# Patient Record
Sex: MALE | Race: BLACK OR AFRICAN AMERICAN | Employment: OTHER | URBAN - METROPOLITAN AREA
[De-identification: names, ages, dates, MRNs, and addresses within clinical notes are randomized per-mention and may not be internally consistent; named-entity substitution may affect disease eponyms.]

---

## 2017-02-15 ENCOUNTER — HOSPITAL ENCOUNTER (INPATIENT)
Age: 72
LOS: 2 days | Discharge: NURSING, REHAB OR LONG-TERM CARE | DRG: 314 | End: 2017-02-17
Attending: EMERGENCY MEDICINE | Admitting: INTERNAL MEDICINE
Payer: MEDICARE

## 2017-02-15 ENCOUNTER — APPOINTMENT (OUTPATIENT)
Dept: GENERAL RADIOLOGY | Age: 72
DRG: 314 | End: 2017-02-15
Attending: EMERGENCY MEDICINE
Payer: MEDICARE

## 2017-02-15 DIAGNOSIS — R50.9 FEVER, UNSPECIFIED FEVER CAUSE: Primary | ICD-10-CM

## 2017-02-15 DIAGNOSIS — N17.9 AKI (ACUTE KIDNEY INJURY) (HCC): ICD-10-CM

## 2017-02-15 LAB
ALBUMIN SERPL BCP-MCNC: 2.7 G/DL (ref 3.4–5)
ALBUMIN/GLOB SERPL: 0.6 {RATIO} (ref 0.8–1.7)
ALP SERPL-CCNC: 101 U/L (ref 45–117)
ALT SERPL-CCNC: 41 U/L (ref 16–61)
ANION GAP BLD CALC-SCNC: 13 MMOL/L (ref 3–18)
APPEARANCE UR: ABNORMAL
AST SERPL W P-5'-P-CCNC: 91 U/L (ref 15–37)
BACTERIA URNS QL MICRO: ABNORMAL /HPF
BASOPHILS # BLD AUTO: 0 K/UL (ref 0–0.1)
BASOPHILS # BLD: 0 % (ref 0–3)
BILIRUB SERPL-MCNC: 0.8 MG/DL (ref 0.2–1)
BILIRUB UR QL: NEGATIVE
BUN SERPL-MCNC: 85 MG/DL (ref 7–18)
BUN/CREAT SERPL: 18 (ref 12–20)
CALCIUM SERPL-MCNC: 8.1 MG/DL (ref 8.5–10.1)
CHLORIDE SERPL-SCNC: 105 MMOL/L (ref 100–108)
CO2 SERPL-SCNC: 26 MMOL/L (ref 21–32)
COLOR UR: ABNORMAL
CREAT SERPL-MCNC: 4.84 MG/DL (ref 0.6–1.3)
DIFFERENTIAL METHOD BLD: ABNORMAL
EOSINOPHIL # BLD: 0.2 K/UL (ref 0–0.4)
EOSINOPHIL NFR BLD: 1 % (ref 0–5)
EPITH CASTS URNS QL MICRO: ABNORMAL /LPF (ref 0–5)
ERYTHROCYTE [DISTWIDTH] IN BLOOD BY AUTOMATED COUNT: 16.8 % (ref 11.6–14.5)
EST. AVERAGE GLUCOSE BLD GHB EST-MCNC: 177 MG/DL
FLUAV AG NPH QL IA: NEGATIVE
FLUBV AG NOSE QL IA: NEGATIVE
GLOBULIN SER CALC-MCNC: 4.3 G/DL (ref 2–4)
GLUCOSE BLD STRIP.AUTO-MCNC: 160 MG/DL (ref 70–110)
GLUCOSE SERPL-MCNC: 235 MG/DL (ref 74–99)
GLUCOSE UR STRIP.AUTO-MCNC: NEGATIVE MG/DL
HBA1C MFR BLD: 7.8 % (ref 4.2–5.6)
HCT VFR BLD AUTO: 42.1 % (ref 36–48)
HGB BLD-MCNC: 13.7 G/DL (ref 13–16)
HGB UR QL STRIP: NEGATIVE
KETONES UR QL STRIP.AUTO: NEGATIVE MG/DL
LACTATE BLD-SCNC: 1.1 MMOL/L (ref 0.4–2)
LACTATE BLD-SCNC: 1.8 MMOL/L (ref 0.4–2)
LEUKOCYTE ESTERASE UR QL STRIP.AUTO: NEGATIVE
LYMPHOCYTES # BLD AUTO: 1 % (ref 20–51)
LYMPHOCYTES # BLD: 0.2 K/UL (ref 0.8–3.5)
MCH RBC QN AUTO: 28.1 PG (ref 24–34)
MCHC RBC AUTO-ENTMCNC: 32.5 G/DL (ref 31–37)
MCV RBC AUTO: 86.4 FL (ref 74–97)
MONOCYTES # BLD: 0.9 K/UL (ref 0–1)
MONOCYTES NFR BLD AUTO: 5 % (ref 2–9)
NEUTS BAND NFR BLD MANUAL: 2 % (ref 0–5)
NEUTS SEG # BLD: 16 K/UL (ref 1.8–8)
NEUTS SEG NFR BLD AUTO: 91 % (ref 42–75)
NITRITE UR QL STRIP.AUTO: NEGATIVE
PH UR STRIP: 5 [PH] (ref 5–8)
PLATELET # BLD AUTO: 96 K/UL (ref 135–420)
PLATELET COMMENTS,PCOM: ABNORMAL
PMV BLD AUTO: 10.4 FL (ref 9.2–11.8)
POTASSIUM SERPL-SCNC: 3.3 MMOL/L (ref 3.5–5.5)
PROT SERPL-MCNC: 7 G/DL (ref 6.4–8.2)
PROT UR STRIP-MCNC: 30 MG/DL
RBC # BLD AUTO: 4.87 M/UL (ref 4.7–5.5)
RBC #/AREA URNS HPF: ABNORMAL /HPF (ref 0–5)
RBC MORPH BLD: ABNORMAL
SODIUM SERPL-SCNC: 144 MMOL/L (ref 136–145)
SP GR UR REFRACTOMETRY: 1.02 (ref 1–1.03)
UROBILINOGEN UR QL STRIP.AUTO: 1 EU/DL (ref 0.2–1)
WBC # BLD AUTO: 17.6 K/UL (ref 4.6–13.2)
WBC URNS QL MICRO: ABNORMAL /HPF (ref 0–4)

## 2017-02-15 PROCEDURE — 82962 GLUCOSE BLOOD TEST: CPT

## 2017-02-15 PROCEDURE — 85025 COMPLETE CBC W/AUTO DIFF WBC: CPT | Performed by: EMERGENCY MEDICINE

## 2017-02-15 PROCEDURE — 74011000258 HC RX REV CODE- 258: Performed by: EMERGENCY MEDICINE

## 2017-02-15 PROCEDURE — 87070 CULTURE OTHR SPECIMN AEROBIC: CPT | Performed by: INTERNAL MEDICINE

## 2017-02-15 PROCEDURE — 96367 TX/PROPH/DG ADDL SEQ IV INF: CPT

## 2017-02-15 PROCEDURE — 83605 ASSAY OF LACTIC ACID: CPT

## 2017-02-15 PROCEDURE — 96365 THER/PROPH/DIAG IV INF INIT: CPT

## 2017-02-15 PROCEDURE — 02PYX3Z REMOVAL OF INFUSION DEVICE FROM GREAT VESSEL, EXTERNAL APPROACH: ICD-10-PCS | Performed by: INTERNAL MEDICINE

## 2017-02-15 PROCEDURE — 65660000000 HC RM CCU STEPDOWN

## 2017-02-15 PROCEDURE — 74011250636 HC RX REV CODE- 250/636: Performed by: INTERNAL MEDICINE

## 2017-02-15 PROCEDURE — 83036 HEMOGLOBIN GLYCOSYLATED A1C: CPT | Performed by: INTERNAL MEDICINE

## 2017-02-15 PROCEDURE — 99285 EMERGENCY DEPT VISIT HI MDM: CPT

## 2017-02-15 PROCEDURE — 96368 THER/DIAG CONCURRENT INF: CPT

## 2017-02-15 PROCEDURE — 51702 INSERT TEMP BLADDER CATH: CPT

## 2017-02-15 PROCEDURE — 87804 INFLUENZA ASSAY W/OPTIC: CPT | Performed by: EMERGENCY MEDICINE

## 2017-02-15 PROCEDURE — 77030005514 HC CATH URETH FOL14 BARD -A

## 2017-02-15 PROCEDURE — 96361 HYDRATE IV INFUSION ADD-ON: CPT

## 2017-02-15 PROCEDURE — 74011250636 HC RX REV CODE- 250/636: Performed by: EMERGENCY MEDICINE

## 2017-02-15 PROCEDURE — 71010 XR CHEST PORT: CPT

## 2017-02-15 PROCEDURE — 87040 BLOOD CULTURE FOR BACTERIA: CPT | Performed by: EMERGENCY MEDICINE

## 2017-02-15 PROCEDURE — 93005 ELECTROCARDIOGRAM TRACING: CPT

## 2017-02-15 PROCEDURE — 80053 COMPREHEN METABOLIC PANEL: CPT | Performed by: EMERGENCY MEDICINE

## 2017-02-15 PROCEDURE — 81001 URINALYSIS AUTO W/SCOPE: CPT | Performed by: EMERGENCY MEDICINE

## 2017-02-15 PROCEDURE — 87086 URINE CULTURE/COLONY COUNT: CPT | Performed by: EMERGENCY MEDICINE

## 2017-02-15 RX ORDER — HEPARIN SODIUM 5000 [USP'U]/ML
5000 INJECTION, SOLUTION INTRAVENOUS; SUBCUTANEOUS EVERY 12 HOURS
Status: DISCONTINUED | OUTPATIENT
Start: 2017-02-15 | End: 2017-02-17 | Stop reason: HOSPADM

## 2017-02-15 RX ORDER — SODIUM CHLORIDE 0.9 % (FLUSH) 0.9 %
5-10 SYRINGE (ML) INJECTION AS NEEDED
Status: DISCONTINUED | OUTPATIENT
Start: 2017-02-15 | End: 2017-02-17 | Stop reason: HOSPADM

## 2017-02-15 RX ORDER — DORZOLAMIDE HYDROCHLORIDE AND TIMOLOL MALEATE 20; 5 MG/ML; MG/ML
1 SOLUTION/ DROPS OPHTHALMIC 2 TIMES DAILY
Status: DISCONTINUED | OUTPATIENT
Start: 2017-02-15 | End: 2017-02-17 | Stop reason: HOSPADM

## 2017-02-15 RX ORDER — SODIUM CHLORIDE 9 MG/ML
125 INJECTION, SOLUTION INTRAVENOUS CONTINUOUS
Status: DISCONTINUED | OUTPATIENT
Start: 2017-02-15 | End: 2017-02-17 | Stop reason: HOSPADM

## 2017-02-15 RX ORDER — NITROGLYCERIN 80 MG/1
1 PATCH TRANSDERMAL DAILY
Status: DISCONTINUED | OUTPATIENT
Start: 2017-02-16 | End: 2017-02-17 | Stop reason: HOSPADM

## 2017-02-15 RX ORDER — LEVOFLOXACIN 5 MG/ML
500 INJECTION, SOLUTION INTRAVENOUS
Status: DISCONTINUED | OUTPATIENT
Start: 2017-02-15 | End: 2017-02-17 | Stop reason: HOSPADM

## 2017-02-15 RX ORDER — LANOLIN ALCOHOL/MO/W.PET/CERES
325 CREAM (GRAM) TOPICAL
Status: DISCONTINUED | OUTPATIENT
Start: 2017-02-16 | End: 2017-02-17 | Stop reason: HOSPADM

## 2017-02-15 RX ORDER — INSULIN LISPRO 100 [IU]/ML
INJECTION, SOLUTION INTRAVENOUS; SUBCUTANEOUS
Status: DISCONTINUED | OUTPATIENT
Start: 2017-02-15 | End: 2017-02-17 | Stop reason: HOSPADM

## 2017-02-15 RX ORDER — LATANOPROST 50 UG/ML
1 SOLUTION/ DROPS OPHTHALMIC
Status: DISCONTINUED | OUTPATIENT
Start: 2017-02-15 | End: 2017-02-17 | Stop reason: HOSPADM

## 2017-02-15 RX ORDER — MAGNESIUM SULFATE 100 %
4 CRYSTALS MISCELLANEOUS AS NEEDED
Status: DISCONTINUED | OUTPATIENT
Start: 2017-02-15 | End: 2017-02-17 | Stop reason: HOSPADM

## 2017-02-15 RX ORDER — BRIMONIDINE TARTRATE 2 MG/ML
1 SOLUTION/ DROPS OPHTHALMIC 2 TIMES DAILY
Status: DISCONTINUED | OUTPATIENT
Start: 2017-02-15 | End: 2017-02-17 | Stop reason: HOSPADM

## 2017-02-15 RX ORDER — DEXTROSE 50 % IN WATER (D50W) INTRAVENOUS SYRINGE
25-50 AS NEEDED
Status: DISCONTINUED | OUTPATIENT
Start: 2017-02-15 | End: 2017-02-17 | Stop reason: HOSPADM

## 2017-02-15 RX ADMIN — SODIUM CHLORIDE 1000 MG: 900 INJECTION, SOLUTION INTRAVENOUS at 21:42

## 2017-02-15 RX ADMIN — SODIUM CHLORIDE 125 ML/HR: 900 INJECTION, SOLUTION INTRAVENOUS at 19:14

## 2017-02-15 RX ADMIN — LEVOFLOXACIN 500 MG: 5 INJECTION, SOLUTION INTRAVENOUS at 17:02

## 2017-02-15 RX ADMIN — SODIUM CHLORIDE 1000 ML: 900 INJECTION, SOLUTION INTRAVENOUS at 15:26

## 2017-02-15 RX ADMIN — SODIUM CHLORIDE 1000 MG: 900 INJECTION, SOLUTION INTRAVENOUS at 17:21

## 2017-02-15 RX ADMIN — PIPERACILLIN SODIUM,TAZOBACTAM SODIUM 4.5 G: 4; .5 INJECTION, POWDER, FOR SOLUTION INTRAVENOUS at 16:54

## 2017-02-15 RX ADMIN — HEPARIN SODIUM 5000 UNITS: 5000 INJECTION, SOLUTION INTRAVENOUS; SUBCUTANEOUS at 19:50

## 2017-02-15 NOTE — IP AVS SNAPSHOT
Current Discharge Medication List  
  
Take these medications at their scheduled times Dose & Instructions Dispensing Information Comments Morning Noon Evening Bedtime  
 allopurinol 100 mg tablet Commonly known as:  Genora Hack Your next dose is:  Tomorrow Dose:  200 mg Take 200 mg by mouth every other day. Refills:  0  
     
  
   
   
   
  
 amLODIPine 5 mg tablet Commonly known as:  Voncile Brooklyn Your next dose is:  Tomorrow Dose:  5 mg Take 5 mg by mouth daily. Refills:  0  
     
  
   
   
   
  
 atorvastatin 10 mg tablet Commonly known as:  LIPITOR Your next dose is:  Tomorrow Dose:  10 mg Take 10 mg by mouth daily. Refills:  0  
     
  
   
   
   
  
 brimonidine 0.2 % ophthalmic solution Commonly known as:  Rachna Camera Your next dose is: Today Dose:  1 Drop Administer 1 Drop to both eyes two (2) times a day. Refills:  0  
     
   
   
  
   
  
 cholecalciferol 1,000 unit Cap Commonly known as:  VITAMIN D3 Your next dose is:  Tomorrow Dose:  1000 Units Take 1,000 Units by mouth daily. Refills:  0  
     
  
   
   
   
  
 dorzolamide-timolol 22.3-6.8 mg/mL ophthalmic solution Commonly known as:  COSOPT Your next dose is: Today Dose:  1 Drop Administer 1 Drop to both eyes two (2) times a day. Refills:  0  
     
   
   
  
   
  
 insulin glargine 100 unit/mL (3 mL) pen Commonly known as:  LANTUS SOLOSTAR Your next dose is:  Tomorrow Dose:  20 Units 20 Units by SubCUTAneous route two (2) times a day. Refills:  0  
     
  
   
   
   
  
 latanoprost 0.005 % ophthalmic solution Commonly known as:  Minerva Barronett Your next dose is: Today Dose:  1 Drop Administer 1 Drop to both eyes nightly. Refills:  0  
     
   
   
  
   
  
 levoFLOXacin 500 mg tablet Commonly known as:  Pipe Rm Your next dose is:  Tomorrow Dose:  500 mg Take 1 Tab by mouth daily. Quantity:  5 Tab Refills:  0  
     
  
   
   
   
  
 nitroglycerin 0.4 mg/hr Commonly known as:  WFCBRRIP Your next dose is:  Tomorrow Dose:  1 Patch 1 Patch by TransDERmal route daily. Refills:  0  
     
  
   
   
   
  
 potassium chloride 20 mEq tablet Commonly known as:  K-DUR, KLOR-CON Your next dose is: Today Dose:  40 mEq Take 40 mEq by mouth two (2) times a day. Refills:  0  
     
   
   
  
   
  
 senna 8.6 mg tablet Commonly known as:  Reed Blank Your next dose is:  Tomorrow Dose:  1 Tab Take 1 Tab by mouth two (2) times a day. Refills:  0  
     
  
   
   
   
  
 spironolactone 50 mg tablet Commonly known as:  ALDACTONE Your next dose is:  Tomorrow Dose:  50 mg Take 50 mg by mouth daily. Refills:  0 Take these medications as directed Dose & Instructions Dispensing Information Comments Morning Noon Evening Bedtime  
 insulin lispro 100 unit/mL injection Commonly known as:  HUMALOG For Blood Sugar (mg/dL) of:  Less than 150 = 0 units  150 -199 = 2 units 200 -249 = 4 units 250 -299 = 6 units 300 -349 = 8 units 350 and above = 10 units and Call Physician Quantity:  1 Vial  
Refills:  0 Where to Get Your Medications Information about where to get these medications is not yet available ! Ask your nurse or doctor about these medications  
  insulin lispro 100 unit/mL injection  
 levoFLOXacin 500 mg tablet

## 2017-02-15 NOTE — ED NOTES
Patient is more alert, he follows command. I asked if he wanted something to drink and he nodded yes. Gave patient gingerale, he drank it without difficulty. He held the cup himself.

## 2017-02-15 NOTE — ED TRIAGE NOTES
Per EMS patient has been having a fever for a few days. He was given Tylenol at the nursing facility prior coming here.  Per EMS , pt is more lethargic than usual.

## 2017-02-15 NOTE — IP AVS SNAPSHOT
[address] [address] 
[phone] Patient: Richi Goldberg MRN: [id number] You are allergic to the following No active allergies Recent Documentation Height Weight BMI Smoking Status 1.778 m 93 kg 29.43 kg/m2 Never Smoker Unresulted Labs Order Current Status CULTURE, BLOOD Preliminary result CULTURE, BLOOD Preliminary result CULTURE, CATHETER TIP Preliminary result Emergency Contacts Name Discharge Info Relation Home Work Mobile Gwyn Meyer [24] [phone] About your hospitalization You were admitted on:  February 15, 2017 You last received care in the:  [address] You were discharged on:  February 17, 2017 Unit phone number:  [phone] Why you were hospitalized Your primary diagnosis was:  Not on File Your diagnoses also included:  Miller (Acute Kidney Injury) (Hcc), Febrile Illness, Acute Providers Seen During Your Hospitalizations Provider Role Specialty Primary office phone Sarita Mcdaniels MD Attending Provider Emergency Medicine [phone] Candis Renteria MD Attending Provider Internal Medicine [phone] Your Primary Care Physician (PCP) Primary Care Physician Office Phone Office Fax Marlene Douglas [phone] Follow-up Information Follow up With Details Comments Contact Info Andrew Hurtado MD   [address] [address] [phone] Current Discharge Medication List  
  
START taking these medications Dose & Instructions Dispensing Information Comments Morning Noon Evening Bedtime  
 insulin lispro 100 unit/mL injection Commonly known as:  HUMALOG  For Blood Sugar (mg/dL) of:  Less than 150 = 0 units  150 -199 = 2 units 200 -249 = 4 units 250 -299 = 6 units 300 -349 = 8 units 350 and above = 10 units and Call Physician Quantity:  1 Vial  
Refills:  0  
     
   
   
   
  
 levoFLOXacin 500 mg tablet Commonly known as:  Octaviano Carolina Your next dose is:  Tomorrow Dose:  500 mg Take 1 Tab by mouth daily. Quantity:  5 Tab Refills:  0 CONTINUE these medications which have NOT CHANGED Dose & Instructions Dispensing Information Comments Morning Noon Evening Bedtime  
 allopurinol 100 mg tablet Commonly known as:  Cynamilcara Dicker Your next dose is:  Tomorrow Dose:  200 mg Take 200 mg by mouth every other day. Refills:  0  
     
  
   
   
   
  
 amLODIPine 5 mg tablet Commonly known as:  Tc Alstrom Your next dose is:  Tomorrow Dose:  5 mg Take 5 mg by mouth daily. Refills:  0  
     
  
   
   
   
  
 atorvastatin 10 mg tablet Commonly known as:  LIPITOR Your next dose is:  Tomorrow Dose:  10 mg Take 10 mg by mouth daily. Refills:  0  
     
  
   
   
   
  
 brimonidine 0.2 % ophthalmic solution Commonly known as:  Alysa Beam Your next dose is: Today Dose:  1 Drop Administer 1 Drop to both eyes two (2) times a day. Refills:  0  
     
   
   
  
   
  
 cholecalciferol 1,000 unit Cap Commonly known as:  VITAMIN D3 Your next dose is:  Tomorrow Dose:  1000 Units Take 1,000 Units by mouth daily. Refills:  0  
     
  
   
   
   
  
 dorzolamide-timolol 22.3-6.8 mg/mL ophthalmic solution Commonly known as:  COSOPT Your next dose is: Today Dose:  1 Drop Administer 1 Drop to both eyes two (2) times a day. Refills:  0  
     
   
   
  
   
  
 insulin glargine 100 unit/mL (3 mL) pen Commonly known as:  LANTUS SOLOSTAR Your next dose is:  Tomorrow Dose:  20 Units 20 Units by SubCUTAneous route two (2) times a day. Refills:  0 latanoprost 0.005 % ophthalmic solution Commonly known as:  Osmany Flores Your next dose is: Today Dose:  1 Drop Administer 1 Drop to both eyes nightly. Refills:  0  
     
   
   
  
   
  
 nitroglycerin 0.4 mg/hr Commonly known as:  DKFBVHXY Your next dose is:  Tomorrow Dose:  1 Patch 1 Patch by TransDERmal route daily. Refills:  0  
     
  
   
   
   
  
 potassium chloride 20 mEq tablet Commonly known as:  K-DUR, KLOR-CON Your next dose is: Today Dose:  40 mEq Take 40 mEq by mouth two (2) times a day. Refills:  0  
     
   
   
  
   
  
 senna 8.6 mg tablet Commonly known as:  Reed Blank Your next dose is:  Tomorrow Dose:  1 Tab Take 1 Tab by mouth two (2) times a day. Refills:  0  
     
  
   
   
   
  
 spironolactone 50 mg tablet Commonly known as:  ALDACTONE Your next dose is:  Tomorrow Dose:  50 mg Take 50 mg by mouth daily. Refills:  0 STOP taking these medications   
 bumetanide 2 mg tablet Commonly known as:  BUMEX  
   
  
 ferrous sulfate 325 mg (65 mg iron) tablet  
   
  
 hydrALAZINE 50 mg tablet Commonly known as:  APRESOLINE  
   
  
 insulin aspart 100 unit/mL injection Commonly known as:  NOVOLOG  
   
  
 metFORMIN 500 mg tablet Commonly known as:  GLUCOPHAGE  
   
  
 metOLazone 5 mg tablet Commonly known as:  Miguel Stubbs Where to Get Your Medications Information on where to get these meds will be given to you by the nurse or doctor. ! Ask your nurse or doctor about these medications  
  insulin lispro 100 unit/mL injection  
 levoFLOXacin 500 mg tablet Discharge Instructions None Discharge Orders None [facility] Announcement We are excited to announce that we are making your provider's discharge notes available to you in [facility].   You will see these notes when they are completed and signed by the physician that discharged you from your recent hospital stay. If you have any questions or concerns about any information you see in [facility], please call the Health Information Department where you were seen or reach out to your Primary Care Provider for more information about your plan of care. Introducing hospitals & HEALTH SERVICES! Clarissa Hazel introduces [facility] patient portal. Now you can access parts of your medical record, email your doctor's office, and request medication refills online. 1. In your internet browser, go to [email or web address][email or web address]/"[employer] 2. Click on the First Time User? Click Here link in the Sign In box. You will see the New Member Sign Up page. 3. Enter your [facility] Access Code exactly as it appears below. You will not need to use this code after youve completed the sign-up process. If you do not sign up before the expiration date, you must request a new code. · [facility] Access Code: [email or web address] Expires: 5/16/2017  3:36 PM 
 
4. Enter the last four digits of your Social Security Number (xxxx) and Date of Birth (mm/dd/yyyy) as indicated and click Submit. You will be taken to the next sign-up page. 5. Create a [facility] ID. This will be your [facility] login ID and cannot be changed, so think of one that is secure and easy to remember. 6. Create a [facility] password. You can change your password at any time. 7. Enter your Password Reset Question and Answer. This can be used at a later time if you forget your password. 8. Enter your e-mail address. You will receive e-mail notification when new information is available in [address] 9. Click Sign Up. You can now view and download portions of your medical record. 10. Click the Download Summary menu link to download a portable copy of your medical information.  
 
If you have questions, please visit the Frequently Asked Questions section of the [facility]. Remember, MyChart is NOT to be used for urgent needs. For medical emergencies, dial 911. Now available from your iPhone and Android! General Information Please provide this summary of care documentation to your next provider. Patient Signature:  ____________________________________________________________ Date:  ____________________________________________________________  
  
Althia Kras Provider Signature:  ____________________________________________________________ Date:  ____________________________________________________________

## 2017-02-15 NOTE — ED NOTES
The Sepsis Screening has been completed on arrival in the Emergency Department. Vital signs:  Patient Vitals for the past 4 hrs:   Temp Pulse Resp BP SpO2   02/15/17 1518 (!) 101.7 °F (38.7 °C) - - - -   02/15/17 1515 - 95 23 130/67 99 %   02/15/17 1500 - 95 23 109/60 94 %   02/15/17 1445 - 95 21 112/58 97 %   02/15/17 1441 98.2 °F (36.8 °C) 94 25 116/57 97 %       MAP (Monitor): 82    =monitored (data validate)  MAP (Calculated): 77    =calculated (manual entry)    Patient meets 2 or more SIRS criteria with suspected infection? YES    IF ANSWER IS YES, INITIATE NURSE DRIVEN SEPSIS ORDERS OR REQUEST SEPSIS BUNDLE ORDER BY PROVIDER.      SIRS Criteria is achieved when two or more of the following are present:    Temperature < 96.8°F (36°C) or > 100.9°F (38.3°C)   Heart Rate > 90 beats per minute   Respiratory Rate > 20 beats per minute   WBC count > 12,000 or <4,000 or > 10% bands

## 2017-02-15 NOTE — PROGRESS NOTES
Reason for Renal Dosing:  Per Renal Dosing Policy    Patient clinical status and labs ordered/reviewed. Pt Weight Weight: 93 kg (205 lb)   Serum Creatinine Lab Results   Component Value Date/Time    Creatinine 4.84 02/15/2017 02:55 PM       Creatinine Clearance Estimated Creatinine Clearance: 16 mL/min (based on Cr of 4.84). BUN Lab Results   Component Value Date/Time    BUN 85 02/15/2017 02:55 PM       WBC Lab Results   Component Value Date/Time    WBC 17.6 02/15/2017 02:55 PM      Temperature Temp: (!) 101.7 °F (38.7 °C)     HR Pulse (Heart Rate): 95     BP BP: 130/67           Drug type: Antibiotic indicated for Sepsis of Unknown Etiology    Drug/dose: Levofloxacin 750 mg every 24 hours was adjusted to Levofloxacin 500 mg every 48 hours. Continue to monitor. Signed Nedra Diaz PHARMD  Date 2/15/2017  Time 4:41 PM    Reason for Renal Dosing:  Per Renal Dosing Policy    Patient clinical status and labs ordered/reviewed. Pt Weight Weight: 93 kg (205 lb)   Serum Creatinine Lab Results   Component Value Date/Time    Creatinine 4.84 02/15/2017 02:55 PM       Creatinine Clearance Estimated Creatinine Clearance: 16 mL/min (based on Cr of 4.84). BUN Lab Results   Component Value Date/Time    BUN 85 02/15/2017 02:55 PM       WBC Lab Results   Component Value Date/Time    WBC 17.6 02/15/2017 02:55 PM      Temperature Temp: (!) 101.7 °F (38.7 °C)     HR Pulse (Heart Rate): 93     BP BP: 119/64           Drug type: Antibiotic indicated for Sepsis of Unknown Etiology    Drug/dose: Piperacillin-Tazobactam 4.5 grams every 6 hours was adjusted to Piperacillin-Tazobactam 4.5 gram once, then 2.25 grams every 6 hours. Continue to monitor.     Signed Nedra Diaz PHARMD  Date 2/15/2017  Time 4:50 PM

## 2017-02-15 NOTE — ED PROVIDER NOTES
HPI Comments: 2:39 PM Tosha Desir is a 70 y.o. male with h/o HTN, DM, gout, and CHF who presents to ED via EMS from [facility] for evaluation of altered mental status, presumably onset one week ago. Per EMS he has been experiencing a fever since yesterday. No other concerns or symptoms at this time. PCP: Barbara Vela MD      The history is provided by the EMS personnel. The history is limited by the condition of the patient (nonverbal). Past Medical History:   Diagnosis Date    CHF (congestive heart failure) ([facility])     DM (diabetes mellitus) ([facility]     Gout     HTN (hypertension)        History reviewed. No pertinent past surgical history. History reviewed. No pertinent family history. Social History     Social History    Marital status: SINGLE     Spouse name: N/A    Number of children: N/A    Years of education: N/A     Occupational History    Not on file. Social History Main Topics    Smoking status: Never Smoker    Smokeless tobacco: Not on file    Alcohol use No    Drug use: No    Sexual activity: Not on file     Other Topics Concern    Not on file     Social History Narrative         ALLERGIES: Review of patient's allergies indicates no known allergies. Review of Systems   Unable to perform ROS: Patient nonverbal       Vitals:    02/15/17 1600 02/15/17 1630 02/15/17 1636 02/15/17 1640   BP: 119/66 119/64     Pulse: 94 91 93 93   Resp: 22 19 20 19   Temp:       SpO2:   97% 96%   Weight:       Height:                Physical Exam   Constitutional: He appears well-developed. No distress. Chronically ill-appearing, nad   HENT:   Head: Normocephalic and atraumatic. Eyes: EOM are normal.   Neck: Normal range of motion. Cardiovascular: Intact distal pulses. Tachycardia present. Pulmonary/Chest: Effort normal. No respiratory distress. Distant breath sounds   Abdominal: Soft. There is no tenderness. Musculoskeletal: Normal range of motion.         Right lower leg: He exhibits no edema. Left lower leg: He exhibits no edema. Moves all extremities. Neurological: He is alert. No focal deficits noted. Follows basic commands. Psychiatric: His behavior is normal.   Nursing note and vitals reviewed. MDM  Number of Diagnoses or Management Options  NEPTALI (acute kidney injury) ([facility] [address]):   Fever, unspecified fever cause:   Diagnosis management comments: 71 yo AAM with PMHx HTN, DM, CHF presents by EMS from SNF with fever. Examination limited due to pt nonverbal.  Examination significant for tachycardia and fever. Will evaluate for pneumonia, sepsis, acute process. 1.  Sepsis orders  2. Symptom management  3. Re-evaluate    5:20 PM  Wbc elevated 17.6 with normal lactic. BUN/Cr also markedly elevated BUN/Cr 85/4.84 from baseline. Presentation seems clinically consistent with pneumonia given increased O2 requirement, but cxr negative. Empiric abx given anyway. Discussed with Dr. Hilario Mccurdy, hospitalist, who agreed to evaluate pt for admission. Updated pt on results and poc.        Amount and/or Complexity of Data Reviewed  Clinical lab tests: ordered and reviewed  Tests in the radiology section of CPT®: ordered and reviewed  Decide to obtain previous medical records or to obtain history from someone other than the patient: yes  Obtain history from someone other than the patient: yes  Review and summarize past medical records: yes  Discuss the patient with other providers: yes  Independent visualization of images, tracings, or specimens: yes    Risk of Complications, Morbidity, and/or Mortality  Presenting problems: high  Diagnostic procedures: high  Management options: high    Patient Progress  Patient progress: stable    ED Course       EKG  Date/Time: 2/15/2017 3:00 PM  Performed by: Alejandra Hammonds by: Valdo Aguirre     ECG reviewed by ED Physician in the absence of a cardiologist: yes    Previous ECG:     Previous ECG: Unavailable  Interpretation:     Interpretation: non-specific    Quality:     Tracing quality:  Limited by artifact  Rate:     ECG rate:  96    ECG rate assessment: normal    Rhythm:     Rhythm: sinus rhythm    Ectopy:     Ectopy: none    QRS:     QRS axis:  Left    QRS intervals:   Wide  Conduction:     Conduction: abnormal      Abnormal conduction: incomplete LBBB    ST segments:     ST segments:  Normal  T waves:     T waves: non-specific            Vitals:  Patient Vitals for the past 12 hrs:   Temp Pulse Resp BP SpO2   02/15/17 1640 - 93 19 - 96 %   02/15/17 1636 - 93 20 - 97 %   02/15/17 1630 - 91 19 119/64 -   02/15/17 1600 - 94 22 119/66 -   02/15/17 1530 - 93 20 115/57 -   02/15/17 1518 (!) 101.7 °F (38.7 °C) - - - -   02/15/17 1515 - 95 23 130/67 99 %   02/15/17 1500 - 95 23 109/60 94 %   02/15/17 1445 - 95 21 112/58 97 %   02/15/17 1441 98.2 °F (36.8 °C) 94 25 116/57 97 %         Medications ordered:   Medications   sodium chloride (NS) flush 5-10 mL (not administered)   levoFLOXacin (LEVAQUIN) 500 mg in D5W IVPB (500 mg IntraVENous New Bag 2/15/17 1702)   vancomycin (VANCOCIN) 1,000 mg in 0.9% sodium chloride (MBP/ADV) 250 mL adv (1,000 mg IntraVENous New Bag 2/15/17 1721)   piperacillin-tazobactam (ZOSYN) 2.25 g in 0.9% sodium chloride (MBP/ADV) 50 mL MBP (not administered)   sodium chloride 0.9 % bolus infusion 1,000 mL (1,000 mL IntraVENous New Bag 2/15/17 1526)   piperacillin-tazobactam (ZOSYN) 4.5 g in 0.9% sodium chloride (MBP/ADV) 100 mL MBP (4.5 g IntraVENous New Bag 2/15/17 1654)         Lab findings:  Recent Results (from the past 12 hour(s))   METABOLIC PANEL, COMPREHENSIVE    Collection Time: 02/15/17  2:55 PM   Result Value Ref Range    Sodium 144 136 - 145 mmol/L    Potassium 3.3 (L) 3.5 - 5.5 mmol/L    Chloride 105 100 - 108 mmol/L    CO2 26 21 - 32 mmol/L    Anion gap 13 3.0 - 18 mmol/L    Glucose 235 (H) 74 - 99 mg/dL    BUN 85 (H) 7.0 - 18 MG/DL    Creatinine 4.84 (H) 0.6 - 1.3 MG/DL BUN/Creatinine ratio 18 12 - 20      GFR est AA 14 (L) >60 ml/min/1.73m2    GFR est non-AA 12 (L) >60 ml/min/1.73m2    Calcium 8.1 (L) 8.5 - 10.1 MG/DL    Bilirubin, total 0.8 0.2 - 1.0 MG/DL    ALT (SGPT) 41 16 - 61 U/L    AST (SGOT) 91 (H) 15 - 37 U/L    Alk. phosphatase 101 45 - 117 U/L    Protein, total 7.0 6.4 - 8.2 g/dL    Albumin 2.7 (L) 3.4 - 5.0 g/dL    Globulin 4.3 (H) 2.0 - 4.0 g/dL    A-G Ratio 0.6 (L) 0.8 - 1.7     CBC WITH AUTOMATED DIFF    Collection Time: 02/15/17  2:55 PM   Result Value Ref Range    WBC 17.6 (H) 4.6 - 13.2 K/uL    RBC 4.87 4.70 - 5.50 M/uL    HGB 13.7 13.0 - 16.0 g/dL    HCT 42.1 36.0 - 48.0 %    MCV 86.4 74.0 - 97.0 FL    MCH 28.1 24.0 - 34.0 PG    MCHC 32.5 31.0 - 37.0 g/dL    RDW 16.8 (H) 11.6 - 14.5 %    PLATELET 96 (L) 225 - 420 K/uL    MPV 10.4 9.2 - 11.8 FL    NEUTROPHILS 91 (H) 42 - 75 %    BAND NEUTROPHILS 2 0 - 5 %    LYMPHOCYTES 1 (L) 20 - 51 %    MONOCYTES 5 2 - 9 %    EOSINOPHILS 1 0 - 5 %    BASOPHILS 0 0 - 3 %    ABS. NEUTROPHILS 16.0 (H) 1.8 - 8.0 K/UL    ABS. LYMPHOCYTES 0.2 (L) 0.8 - 3.5 K/UL    ABS. MONOCYTES 0.9 0 - 1.0 K/UL    ABS. EOSINOPHILS 0.2 0.0 - 0.4 K/UL    ABS.  BASOPHILS 0.0 0.0 - 0.1 K/UL    PLATELET COMMENTS DECREASED PLATELETS      RBC COMMENTS ANISOCYTOSIS  1+        DF MANUAL     EKG, 12 LEAD, INITIAL    Collection Time: 02/15/17  2:57 PM   Result Value Ref Range    Ventricular Rate 96 BPM    Atrial Rate 96 BPM    P-R Interval 212 ms    QRS Duration 138 ms    Q-T Interval 406 ms    QTC Calculation (Bezet) 512 ms    Calculated P Axis 67 degrees    Calculated R Axis -68 degrees    Calculated T Axis 112 degrees    Diagnosis       Sinus rhythm with 1st degree AV block  Left axis deviation  Left ventricular hypertrophy with QRS widening and repolarization abnormality  Abnormal ECG  When compared with ECG of 07-AUG-2011 12:13,  aberrant conduction is no longer present  DE interval has increased  Nonspecific T wave abnormality no longer evident in Inferior leads  T wave inversion no longer evident in Anterior leads  QT has lengthened     POC LACTIC ACID    Collection Time: 02/15/17  3:04 PM   Result Value Ref Range    Lactic Acid (POC) 1.8 0.4 - 2.0 mmol/L   URINALYSIS W/ RFLX MICROSCOPIC    Collection Time: 02/15/17  3:10 PM   Result Value Ref Range    Color DARK YELLOW      Appearance CLOUDY      Specific gravity 1.024 1.005 - 1.030      pH (UA) 5.0 5.0 - 8.0      Protein 30 (A) NEG mg/dL    Glucose NEGATIVE  NEG mg/dL    Ketone NEGATIVE  NEG mg/dL    Bilirubin NEGATIVE  NEG      Blood NEGATIVE  NEG      Urobilinogen 1.0 0.2 - 1.0 EU/dL    Nitrites NEGATIVE  NEG      Leukocyte Esterase NEGATIVE  NEG     URINE MICROSCOPIC ONLY    Collection Time: 02/15/17  3:10 PM   Result Value Ref Range    WBC 0 to 3 0 - 4 /hpf    RBC 0 to 3 0 - 5 /hpf    Epithelial cells 1+ 0 - 5 /lpf    Bacteria 1+ (A) NEG /hpf       X-Ray, CT or other radiology findings or impressions:  XR CHEST PORT   Final Result   IMPRESSION:  Stable enlargement of cardiac silhouette.   No acute pulmonary process identified    Interpreted by radiologist 16:45       Orders:  Orders Placed This Encounter    SEPSIS BUNDLE INITIATED IN ED (REQUIRED)     Standing Status:   Standing     Number of Occurrences:   1    CULTURE, BLOOD     Standing Status:   Standing     Number of Occurrences:   1    CULTURE, BLOOD     Standing Status:   Standing     Number of Occurrences:   1    CULTURE, URINE     Standing Status:   Standing     Number of Occurrences:   1     Order Specific Question:   Reason for Culture     Answer:   Pelvic pain    INFLUENZA A & B AG (RAPID TEST)     Standing Status:   Standing     Number of Occurrences:   1    XR CHEST PORT     Standing Status:   Standing     Number of Occurrences:   1     Order Specific Question:   Reason for Exam     Answer:   Sepsis    URINALYSIS W/ RFLX MICROSCOPIC     Standing Status:   Standing     Number of Occurrences:   1    METABOLIC PANEL, COMPREHENSIVE Standing Status:   Standing     Number of Occurrences:   1    CBC WITH AUTOMATED DIFF     Standing Status:   Standing     Number of Occurrences:   1    URINE MICROSCOPIC ONLY     Standing Status:   Standing     Number of Occurrences:   1    POC LACTIC ACID     Standing Status:   Standing     Number of Occurrences:   1    VITAL SIGNS - PER UNIT ROUTINE     PER UNIT ROUTINE     Standing Status:   Standing     Number of Occurrences:   1    STRICT I & O     Standing Status:   Standing     Number of Occurrences:   1    NEUROLOGIC STATUS ASSESSMENT - PER UNIT ROUTINE     PER UNIT ROUTINE     Standing Status:   Standing     Number of Occurrences:   1    EKG NOTEWRITER(ASAP ONLY)     This order was created via procedure documentation     Standing Status:   Standing     Number of Occurrences:   1    POC LACTIC ACID     Standing Status:   Standing     Number of Occurrences:   1    EKG, 12 LEAD, INITIAL     Standing Status:   Standing     Number of Occurrences:   1     Order Specific Question:   Reason for Exam:     Answer:   Sepsis    SALINE LOCK IV ONE TIME STAT     Standing Status:   Standing     Number of Occurrences:   1    sodium chloride (NS) flush 5-10 mL    sodium chloride 0.9 % bolus infusion 1,000 mL    DISCONTD: vancomycin (VANCOCIN) 1,000 mg in 0.9% sodium chloride (MBP/ADV) 250 mL adv     Order Specific Question:   Antibiotic Indications     Answer:   Sepsis of Unknown Etiology    DISCONTD: piperacillin-tazobactam (ZOSYN) 4.5 g in 0.9% sodium chloride (MBP/ADV) 100 mL MBP     Order Specific Question:   Antibiotic Indications     Answer:   Sepsis of Unknown Etiology    levoFLOXacin (LEVAQUIN) 500 mg in D5W IVPB     Order Specific Question:   Antibiotic Indications     Answer:   Sepsis of Unknown Etiology    vancomycin (VANCOCIN) 1,000 mg in 0.9% sodium chloride (MBP/ADV) 250 mL adv     Order Specific Question:   Antibiotic Indications     Answer:   Sepsis of Unknown Etiology    piperacillin-tazobactam (ZOSYN) 4.5 g in 0.9% sodium chloride (MBP/ADV) 100 mL MBP     Order Specific Question:   Antibiotic Indications     Answer:   Sepsis of Unknown Etiology    piperacillin-tazobactam (ZOSYN) 2.25 g in 0.9% sodium chloride (MBP/ADV) 50 mL MBP     Order Specific Question:   Antibiotic Indications     Answer:   Sepsis of Unknown Etiology    IP CONSULT TO HOSPITALIST     Standing Status:   Standing     Number of Occurrences:   1     Order Specific Question:   Reason for Consult: Answer: FEVER     Order Specific Question:   Did you call or speak to the consulting provider? Answer:   No     Order Specific Question:   Consult To     Answer:   hospitalist    INITIAL PHYSICIAN ORDER: INPATIENT Telemetry; 3. Patient receiving treatment that can only be provided in an inpatient setting (further clarification in H&P documentation)     Standing Status:   Standing     Number of Occurrences:   1     Order Specific Question:   Status: Answer:   Inpatient [101]     Order Specific Question:   Type of Bed     Answer:   Telemetry [19]     Order Specific Question:   Inpatient Hospitalization Certified Necessary for the Following Reasons     Answer:   3.  Patient receiving treatment that can only be provided in an inpatient setting (further clarification in H&P documentation)     Order Specific Question:   Admitting Diagnosis     Answer:   Febrile illness, acute [[id number]]     Order Specific Question:   Admitting Diagnosis     Answer:   NEPTALI (acute kidney injury) [facility]) [[id number]]     Order Specific Question:   Admitting Physician     Answer:   Ben Somers     Order Specific Question:   Attending Physician     Answer:   Ben Somers     Order Specific Question:   Estimated Length of Stay     Answer:   3-4 Midnights     Order Specific Question:   Discharge Plan:     Answer:   [facility] [address] (e.g. Adult Home, Nursing Home, etc.)   [address] [place] [address] to dose vancomycin after once dose. Standing Status:   Standing     Number of Occurrences:   1     Order Specific Question:   Antibiotic Indications     Answer:   Sepsis of Unknown Etiology       Reevaluation, Progress notes, Consult notes, or additional Procedure notes:   5:05 PM Consult: I discussed care with Dr. Ezra Shoemaker (Hospitalist). It was a standard discussion including patient history, chief complaint, available diagnostic results, and predicted treatment course. Dr. Ezra Shoemaker agrees to evaluate the patient for admission. Disposition:  Diagnosis:   1. Fever, unspecified fever cause    2. [facility]        Disposition:     Follow-up Information     None           Patient's Medications   Start Taking    No medications on file   Continue Taking    ALLOPURINOL (ZYLOPRIM) 100 MG TABLET    Take 200 mg by mouth every other day. AMLODIPINE (NORVASC) 5 MG TABLET    Take 5 mg by mouth daily. ATORVASTATIN (LIPITOR) 10 MG TABLET    Take 10 mg by mouth daily. BRIMONIDINE (ALPHAGAN) 0.2 % OPHTHALMIC SOLUTION    Administer 1 Drop to both eyes two (2) times a day. BUMETANIDE (BUMEX) 2 MG TABLET    Take 2 mg by mouth two (2) times a day. CHOLECALCIFEROL (VITAMIN D3) 1,000 UNIT CAP    Take 1,000 Units by mouth daily. DORZOLAMIDE-TIMOLOL (COSOPT) 22.3-6.8 MG/ML OPHTHALMIC SOLUTION    Administer 1 Drop to both eyes two (2) times a day. FERROUS SULFATE 325 MG (65 MG IRON) TABLET    Take 325 mg by mouth Daily (before breakfast). HYDRALAZINE (APRESOLINE) 50 MG TABLET    Take 50 mg by mouth three (3) times daily. INSULIN ASPART (NOVOLOG) 100 UNIT/ML INJECTION    by SubCUTAneous route two (2) times a day. INSULIN GLARGINE (LANTUS SOLOSTAR) 100 UNIT/ML (3 ML) PEN    20 Units by SubCUTAneous route two (2) times a day. LATANOPROST (XALATAN) 0.005 % OPHTHALMIC SOLUTION    Administer 1 Drop to both eyes nightly.     METFORMIN (GLUCOPHAGE) 500 MG TABLET    Take 500 mg by mouth two (2) times daily (with meals). METOLAZONE (ZAROXOLYN) 5 MG TABLET    Take 5 mg by mouth three (3) times daily. NITROGLYCERIN (NITRODUR) 0.4 MG/HR    1 Patch by TransDERmal route daily. POTASSIUM CHLORIDE (K-DUR, KLOR-CON) 20 MEQ TABLET    Take 40 mEq by mouth two (2) times a day. SENNA (SENOKOT) 8.6 MG TABLET    Take 1 Tab by mouth two (2) times a day. SPIRONOLACTONE (ALDACTONE) 50 MG TABLET    Take 50 mg by mouth daily. These Medications have changed    No medications on file   Stop Taking    No medications on file       Scribe [address] for and in the presence of Alvaro Corbin MD (02/15/17)      Physician Attestation  I personally performed the services described in this documentation, reviewed and edited the documentation which was dictated to the scribe in my presence, and it accurately records my words and actions.     Alvaro Corbin MD (02/15/17)      Signed by: Edie Gr, February 15, 2017 at 5:34 PM

## 2017-02-16 LAB
ALBUMIN SERPL BCP-MCNC: 2.4 G/DL (ref 3.4–5)
ALBUMIN/GLOB SERPL: 0.6 {RATIO} (ref 0.8–1.7)
ALP SERPL-CCNC: 87 U/L (ref 45–117)
ALT SERPL-CCNC: 54 U/L (ref 16–61)
ANION GAP BLD CALC-SCNC: 10 MMOL/L (ref 3–18)
AST SERPL W P-5'-P-CCNC: 80 U/L (ref 15–37)
BASOPHILS # BLD AUTO: 0 K/UL (ref 0–0.06)
BASOPHILS # BLD: 0 % (ref 0–2)
BILIRUB DIRECT SERPL-MCNC: 0.3 MG/DL (ref 0–0.2)
BILIRUB SERPL-MCNC: 0.7 MG/DL (ref 0.2–1)
BUN SERPL-MCNC: 75 MG/DL (ref 7–18)
BUN/CREAT SERPL: 21 (ref 12–20)
CALCIUM SERPL-MCNC: 7.8 MG/DL (ref 8.5–10.1)
CHLORIDE SERPL-SCNC: 110 MMOL/L (ref 100–108)
CO2 SERPL-SCNC: 26 MMOL/L (ref 21–32)
CREAT SERPL-MCNC: 3.64 MG/DL (ref 0.6–1.3)
DIFFERENTIAL METHOD BLD: ABNORMAL
EOSINOPHIL # BLD: 0 K/UL (ref 0–0.4)
EOSINOPHIL NFR BLD: 0 % (ref 0–5)
ERYTHROCYTE [DISTWIDTH] IN BLOOD BY AUTOMATED COUNT: 16.9 % (ref 11.6–14.5)
GLOBULIN SER CALC-MCNC: 3.9 G/DL (ref 2–4)
GLUCOSE BLD STRIP.AUTO-MCNC: 114 MG/DL (ref 70–110)
GLUCOSE BLD STRIP.AUTO-MCNC: 124 MG/DL (ref 70–110)
GLUCOSE SERPL-MCNC: 121 MG/DL (ref 74–99)
HCT VFR BLD AUTO: 39 % (ref 36–48)
HGB BLD-MCNC: 12.2 G/DL (ref 13–16)
LYMPHOCYTES # BLD AUTO: 8 % (ref 21–52)
LYMPHOCYTES # BLD: 0.9 K/UL (ref 0.9–3.6)
MCH RBC QN AUTO: 27.3 PG (ref 24–34)
MCHC RBC AUTO-ENTMCNC: 31.3 G/DL (ref 31–37)
MCV RBC AUTO: 87.2 FL (ref 74–97)
MONOCYTES # BLD: 0.6 K/UL (ref 0.05–1.2)
MONOCYTES NFR BLD AUTO: 5 % (ref 3–10)
NEUTS SEG # BLD: 9.9 K/UL (ref 1.8–8)
NEUTS SEG NFR BLD AUTO: 87 % (ref 40–73)
PLATELET # BLD AUTO: 91 K/UL (ref 135–420)
PMV BLD AUTO: 10.9 FL (ref 9.2–11.8)
POTASSIUM SERPL-SCNC: 2.9 MMOL/L (ref 3.5–5.5)
PROT SERPL-MCNC: 6.3 G/DL (ref 6.4–8.2)
RBC # BLD AUTO: 4.47 M/UL (ref 4.7–5.5)
SODIUM SERPL-SCNC: 146 MMOL/L (ref 136–145)
WBC # BLD AUTO: 11.4 K/UL (ref 4.6–13.2)

## 2017-02-16 PROCEDURE — 74011250636 HC RX REV CODE- 250/636: Performed by: EMERGENCY MEDICINE

## 2017-02-16 PROCEDURE — 65660000000 HC RM CCU STEPDOWN

## 2017-02-16 PROCEDURE — 77010033678 HC OXYGEN DAILY

## 2017-02-16 PROCEDURE — 80076 HEPATIC FUNCTION PANEL: CPT | Performed by: INTERNAL MEDICINE

## 2017-02-16 PROCEDURE — 77030020263 HC SOL INJ SOD CL0.9% LFCR 1000ML

## 2017-02-16 PROCEDURE — 74011250636 HC RX REV CODE- 250/636: Performed by: INTERNAL MEDICINE

## 2017-02-16 PROCEDURE — 82962 GLUCOSE BLOOD TEST: CPT

## 2017-02-16 PROCEDURE — L4396 STATIC OR DYNAMI AFO PRE CST: HCPCS

## 2017-02-16 PROCEDURE — 92610 EVALUATE SWALLOWING FUNCTION: CPT

## 2017-02-16 PROCEDURE — 85025 COMPLETE CBC W/AUTO DIFF WBC: CPT | Performed by: INTERNAL MEDICINE

## 2017-02-16 PROCEDURE — 74011000250 HC RX REV CODE- 250: Performed by: INTERNAL MEDICINE

## 2017-02-16 PROCEDURE — 74011000258 HC RX REV CODE- 258: Performed by: EMERGENCY MEDICINE

## 2017-02-16 PROCEDURE — 97165 OT EVAL LOW COMPLEX 30 MIN: CPT

## 2017-02-16 PROCEDURE — 74011250636 HC RX REV CODE- 250/636: Performed by: FAMILY MEDICINE

## 2017-02-16 PROCEDURE — 97162 PT EVAL MOD COMPLEX 30 MIN: CPT

## 2017-02-16 PROCEDURE — 97530 THERAPEUTIC ACTIVITIES: CPT

## 2017-02-16 PROCEDURE — 80048 BASIC METABOLIC PNL TOTAL CA: CPT | Performed by: INTERNAL MEDICINE

## 2017-02-16 PROCEDURE — 74011250637 HC RX REV CODE- 250/637: Performed by: INTERNAL MEDICINE

## 2017-02-16 PROCEDURE — 36415 COLL VENOUS BLD VENIPUNCTURE: CPT | Performed by: INTERNAL MEDICINE

## 2017-02-16 RX ORDER — POTASSIUM CHLORIDE 20 MEQ/1
20 TABLET, EXTENDED RELEASE ORAL DAILY
Status: DISCONTINUED | OUTPATIENT
Start: 2017-02-17 | End: 2017-02-17 | Stop reason: HOSPADM

## 2017-02-16 RX ORDER — POTASSIUM CHLORIDE 7.45 MG/ML
10 INJECTION INTRAVENOUS
Status: COMPLETED | OUTPATIENT
Start: 2017-02-16 | End: 2017-02-16

## 2017-02-16 RX ADMIN — SODIUM CHLORIDE 125 ML/HR: 900 INJECTION, SOLUTION INTRAVENOUS at 06:56

## 2017-02-16 RX ADMIN — PIPERACILLIN SODIUM,TAZOBACTAM SODIUM 2.25 G: 2; .25 INJECTION, POWDER, FOR SOLUTION INTRAVENOUS at 17:31

## 2017-02-16 RX ADMIN — POTASSIUM CHLORIDE 10 MEQ: 10 INJECTION, SOLUTION INTRAVENOUS at 20:33

## 2017-02-16 RX ADMIN — PIPERACILLIN SODIUM,TAZOBACTAM SODIUM 2.25 G: 2; .25 INJECTION, POWDER, FOR SOLUTION INTRAVENOUS at 00:29

## 2017-02-16 RX ADMIN — POTASSIUM CHLORIDE 10 MEQ: 10 INJECTION, SOLUTION INTRAVENOUS at 06:59

## 2017-02-16 RX ADMIN — PIPERACILLIN SODIUM,TAZOBACTAM SODIUM 2.25 G: 2; .25 INJECTION, POWDER, FOR SOLUTION INTRAVENOUS at 12:18

## 2017-02-16 RX ADMIN — PIPERACILLIN SODIUM,TAZOBACTAM SODIUM 2.25 G: 2; .25 INJECTION, POWDER, FOR SOLUTION INTRAVENOUS at 23:27

## 2017-02-16 RX ADMIN — POTASSIUM CHLORIDE 10 MEQ: 10 INJECTION, SOLUTION INTRAVENOUS at 09:54

## 2017-02-16 RX ADMIN — PIPERACILLIN SODIUM,TAZOBACTAM SODIUM 2.25 G: 2; .25 INJECTION, POWDER, FOR SOLUTION INTRAVENOUS at 05:46

## 2017-02-16 RX ADMIN — BRIMONIDINE TARTRATE 1 DROP: 2 SOLUTION/ DROPS OPHTHALMIC at 17:28

## 2017-02-16 RX ADMIN — SODIUM CHLORIDE 125 ML/HR: 900 INJECTION, SOLUTION INTRAVENOUS at 17:31

## 2017-02-16 RX ADMIN — DORZOLAMIDE HYDROCHLORIDE AND TIMOLOL MALEATE 1 DROP: 20; 5 SOLUTION/ DROPS OPHTHALMIC at 17:28

## 2017-02-16 RX ADMIN — POTASSIUM CHLORIDE 10 MEQ: 10 INJECTION, SOLUTION INTRAVENOUS at 19:05

## 2017-02-16 RX ADMIN — POTASSIUM CHLORIDE 10 MEQ: 10 INJECTION, SOLUTION INTRAVENOUS at 21:28

## 2017-02-16 NOTE — PROGRESS NOTES
OT order received and chart reviewed. 1041: 1st attempt at OT evaluation. Patient drowsy and very difficult to understand patient when asked about prior history and ADLs. Will re-attempt later in hope that family will be present to assist in providing background history.     Thank you for the referral.    Kayleen Edouard MS OTR/L  Office Ext: [phone]  Pager: [phone]

## 2017-02-16 NOTE — DIABETES MGMT
NUTRITIONAL ASSESSMENT AND  PLAN OF CARE     Milena Baker           71 y.o.           2/15/2017                 1. Fever, unspecified fever cause    2. NEPTALI (acute kidney injury) ([facility] [address]    T2DM     INTERVENTIONS/PLAN:   Monitor po status, glycemic control, labs and weights. ASSESSMENT:   Nutritional Status:  Pt is 121% ideal wt; BMI (calculated): 28.9 kg/m2 (overweight classification); pt appears well nourished however documented weights indicate pt has lost 22# over past 11 months (10% weight loss). Diabetes Management:   Increased A1C seen since March, 2016 when pt's A1C was 5.3%. Recent blood glucose:   2/16/17:  Lab - 121  2/15/17:  POC - 160    Within target range (non-ICU: <140; ICU<180): [x] Yes, today   []  No    Current Insulin regimen:   Corrective lispro ACHS, normal insulin sensitivity  Home medication/insulin regimen:   Per PTA list:  Lantus 20 units BID  Novolog BID (no amount stated)  Metformin 500 mg BID    Per MD list on H&P:  Lantus 20 units/d  Metformin 500 mg BID    HbA1c: 7.8% - ave BG has been ~ 174 mg/dL over past 3 months   Adequate glycemic control PTA:  [x] Yes, considering pt's age and co-morbidities  [] No       SUBJECTIVE/OBJECTIVE:   Information obtained from: chart review  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 Pt admitted from [address] [place] [address] records reviewed but they do not include medication or diet orders. Attempted to talk with pt but he was sleeping soundly and did not respond to loud verbal stimuli x3. No family at bedside. Pt admitted with AMS, NEPTALI and fever with past medical history significant for T2DM,  hypertension, gout, history of GI bleed in 2016. Diet: NPO    No data found.     Medications: [x]                Reviewed   IVF:  NS at 125 ml/hr    Most Recent POC Glucose:   Recent Labs      02/16/17   0425  02/15/17   1455   GLU  121*  235*         Labs:   Lab Results   Component Value Date/Time    Hemoglobin A1c 7.8 02/15/2017 06:15 PM     Lab Results   Component Value Date/Time    Sodium 146 02/16/2017 04:25 AM    Potassium 2.9 02/16/2017 04:25 AM    Chloride 110 02/16/2017 04:25 AM    CO2 26 02/16/2017 04:25 AM    Anion gap 10 02/16/2017 04:25 AM    Glucose 121 02/16/2017 04:25 AM    BUN 75 02/16/2017 04:25 AM    Creatinine 3.64 02/16/2017 04:25 AM    Calcium 7.8 02/16/2017 04:25 AM    Magnesium 2.5 03/15/2016 09:50 AM    Phosphorus 4.3 03/15/2016 09:50 AM    Albumin 2.4 02/16/2017 04:25 AM       Anthropometrics: IBW : 75.5 kg (166 lb 7.2 oz), % IBW (Calculated): 120.76 %, BMI (calculated): 28.9  Wt Readings from Last 1 Encounters:   02/16/17 91.2 kg (201 lb)    3/15/16 weight - 101.2 kg (prior admission at [facility])    Ht Readings from Last 1 Encounters:   02/16/17 5' 10\" (1.778 m)       Estimated Nutrition Needs:  2017 Kcals/day, Protein (g): 76 g Fluid (ml): 1900 ml  Based on:   [x]          Actual BW    []          ABW   []            Adjusted BW        Nutrition Diagnoses: Altered nutrition related labs due to diabetes as evidenced by A1C of 7.8%. Nutrition Interventions:  none at this time - NPO  Goal:   Blood glucose will be within target range of  mg/dL by 2/19/17 - met 2/16/17. Po intake will meet >75% estimated energy and protein needs by 2/21/17.         Nutrition Monitoring and Evaluation      []     Monitor po intake on meal rounds  [x]     Continue inpatient monitoring and intervention  []     Other:      Nutrition Risk:  []   High     [x]  Moderate    []  Minimal/Uncompromised    Nayeli Gaspar RD, CDE   Office:  [address] [place] [address] Pager:  [phone]

## 2017-02-16 NOTE — PROGRESS NOTES
[address] [place], [place] [address]  Hospitalist Division    Daily progress Note    Patient: Cyndy Banerjee MRN: [id number]  CSN: [id number]    YOB: 1945  Age: 70 y.o.   Sex: male    DOA: 2/15/2017 LOS:  LOS: 1 day                    Subjective:     CC: fever  More alert and awake today  Passed ST eval, afebrile     Objective:      Visit Vitals    /69    Pulse 86    Temp 97.6 °F (36.4 °C)    Resp 18    Ht 5' 10\" (1.778 m)    Wt 91.2 kg (201 lb)    SpO2 95%    BMI 28.84 kg/m2       Physical Exam:    NC/AT  NO JVD,TMG  S1/S2 RRR  CTAB, NO WHEEZING  NT,ND, NABS  NO EDEMA  CN INTACT, MS EQUAL ALL 4 EXT        Intake and Output:  Current Shift:  02/16 0701 - 02/16 1900  In: 0   Out: 600 [Urine:600]  Last three shifts:  02/14 1901 - 02/16 0700  In: 1668.8 [I.V.:1668.8]  Out: 515 [Urine:515]    Recent Results (from the past 24 hour(s))   INFLUENZA A & B AG (RAPID TEST)    Collection Time: 02/15/17  5:15 PM   Result Value Ref Range    Influenza A Antigen NEGATIVE  NEG      Influenza B Antigen NEGATIVE  NEG     HEMOGLOBIN A1C WITH EAG    Collection Time: 02/15/17  6:15 PM   Result Value Ref Range    Hemoglobin A1c 7.8 (H) 4.2 - 5.6 %    Est. average glucose 177 mg/dL   CULTURE, CATHETER TIP    Collection Time: 02/15/17  6:30 PM   Result Value Ref Range    Special Requests: NO SPECIAL REQUESTS      Culture result: NO GROWTH AFTER 13 HOURS     POC LACTIC ACID    Collection Time: 02/15/17  7:42 PM   Result Value Ref Range    Lactic Acid (POC) 1.1 0.4 - 2.0 mmol/L   GLUCOSE, POC    Collection Time: 02/15/17  9:18 PM   Result Value Ref Range    Glucose (POC) 160 (H) 70 - 110 mg/dL   CBC WITH AUTOMATED DIFF    Collection Time: 02/16/17  4:25 AM   Result Value Ref Range    WBC 11.4 4.6 - 13.2 K/uL    RBC 4.47 (L) 4.70 - 5.50 M/uL    HGB 12.2 (L) 13.0 - 16.0 g/dL    HCT 39.0 36.0 - 48.0 %    MCV 87.2 74.0 - 97.0 FL    MCH 27.3 24.0 - 34.0 PG    MCHC 31.3 31.0 - 37.0 g/dL    RDW 16.9 (H) 11.6 - 14.5 %    PLATELET 91 (L) 508 - 420 K/uL    MPV 10.9 9.2 - 11.8 FL    NEUTROPHILS 87 (H) 40 - 73 %    LYMPHOCYTES 8 (L) 21 - 52 %    MONOCYTES 5 3 - 10 %    EOSINOPHILS 0 0 - 5 %    BASOPHILS 0 0 - 2 %    ABS. NEUTROPHILS 9.9 (H) 1.8 - 8.0 K/UL    ABS. LYMPHOCYTES 0.9 0.9 - 3.6 K/UL    ABS. MONOCYTES 0.6 0.05 - 1.2 K/UL    ABS. EOSINOPHILS 0.0 0.0 - 0.4 K/UL    ABS. BASOPHILS 0.0 0.0 - 0.06 K/UL    DF AUTOMATED     METABOLIC PANEL, BASIC    Collection Time: 02/16/17  4:25 AM   Result Value Ref Range    Sodium 146 (H) 136 - 145 mmol/L    Potassium 2.9 (LL) 3.5 - 5.5 mmol/L    Chloride 110 (H) 100 - 108 mmol/L    CO2 26 21 - 32 mmol/L    Anion gap 10 3.0 - 18 mmol/L    Glucose 121 (H) 74 - 99 mg/dL    BUN 75 (H) 7.0 - 18 MG/DL    Creatinine 3.64 (H) 0.6 - 1.3 MG/DL    BUN/Creatinine ratio 21 (H) 12 - 20      GFR est AA 20 (L) >60 ml/min/1.73m2    GFR est non-AA 17 (L) >60 ml/min/1.73m2    Calcium 7.8 (L) 8.5 - 10.1 MG/DL   HEPATIC FUNCTION PANEL    Collection Time: 02/16/17  4:25 AM   Result Value Ref Range    Protein, total 6.3 (L) 6.4 - 8.2 g/dL    Albumin 2.4 (L) 3.4 - 5.0 g/dL    Globulin 3.9 2.0 - 4.0 g/dL    A-G Ratio 0.6 (L) 0.8 - 1.7      Bilirubin, total 0.7 0.2 - 1.0 MG/DL    Bilirubin, direct 0.3 (H) 0.0 - 0.2 MG/DL    Alk.  phosphatase 87 45 - 117 U/L    AST (SGOT) 80 (H) 15 - 37 U/L    ALT (SGPT) 54 16 - 61 U/L         Current Facility-Administered Medications:     potassium chloride 10 mEq in 100 ml IVPB, 10 mEq, IntraVENous, Q1H, Tj Zendejas MD    [START ON 2/17/2017] potassium chloride (K-DUR, KLOR-CON) SR tablet 20 mEq, 20 mEq, Oral, DAILY, Tj Zendejas MD    sodium chloride (NS) flush 5-10 mL, 5-10 mL, IntraVENous, PRN, Aurelio Saleem MD    levoFLOXacin (LEVAQUIN) 500 mg in D5W IVPB, 500 mg, IntraVENous, Q48H, Aurelio Saleem MD, Stopped at 02/15/17 1802    piperacillin-tazobactam (ZOSYN) 2.25 g in 0.9% sodium chloride (MBP/ADV) 50 mL MBP, 2.25 g, IntraVENous, Q6H, Aurelio Saleem, MD, Last Rate: 100 mL/hr at 02/16/17 1218, 2.25 g at 02/16/17 1218    brimonidine (ALPHAGAN) 0.2 % ophthalmic solution 1 Drop, 1 Drop, Both Eyes, BID, Francesca Ken MD, Stopped at 02/15/17 2200    dorzolamide-timolol (COSOPT) 22.3-6.8 mg/mL ophthalmic solution 1 Drop, 1 Drop, Both Eyes, BID, Francesca Ken MD, Stopped at 02/15/17 2200    ferrous sulfate tablet 325 mg, 325 mg, Oral, ACB, Francesca Ken MD, Stopped at 02/16/17 0730    latanoprost (XALATAN) 0.005 % ophthalmic solution 1 Drop, 1 Drop, Both Eyes, QHS, Francesca Ken MD, Stopped at 02/15/17 2200    nitroglycerin (NITRODUR) 0.4 mg/hr patch 1 Patch, 1 Patch, TransDERmal, DAILY, Francesca Ken MD, 1 Patch at 02/16/17 0954    0.9% sodium chloride infusion, 125 mL/hr, IntraVENous, CONTINUOUS, Francesca Ken MD, Last Rate: 125 mL/hr at 02/16/17 0656, 125 mL/hr at 02/16/17 0656    insulin lispro (HUMALOG) injection, , SubCUTAneous, AC&HS, Francesca Ken MD, Stopped at 02/15/17 2200    glucose chewable tablet 16 g, 4 Tab, Oral, PRN, Francesca Ken MD    glucagon (GLUCAGEN) injection 1 mg, 1 mg, IntraMUSCular, PRN, Francesca Ken MD    dextrose (D50W) injection syrg 12.5-25 g, 25-50 mL, IntraVENous, PRN, Francesca Ken MD    heparin (porcine) injection 5,000 Units, 5,000 Units, SubCUTAneous, Q12H, Francesca Ken MD, Stopped at 02/16/17 0549    [START ON 2/17/2017] vancomycin (VANCOCIN) 1,250 mg in 0.9% sodium chloride 250 mL IVPB, 1,250 mg, IntraVENous, Q36H, Jose Mendoza MD    [START ON 2/20/2017] VANCOMYCIN INFORMATION NOTE, , Other, Jovita Barajas MD    Lab Results   Component Value Date/Time    Glucose 121 02/16/2017 04:25 AM    Glucose 235 02/15/2017 02:55 PM    Glucose 99 03/17/2016 04:50 AM    Glucose 148 03/16/2016 12:02 PM    Glucose 118 03/15/2016 09:50 AM        Assessment/Plan     Active Problems:    NEPTALI (acute kidney injury) ([facility] [address] (2/15/2017)      Febrile illness, acute (2/15/2017)        1. Febrile illness. Most likely source is PICC  Cont  With Levaquin, Zosyn and vancomycin renally dosed. 2. Leukocytosis with left shift secondary to above, resolved   3. Acute kidney injury on chronic kidney disease secondary to fever  and diuretics, improved. Cont to hold Bumex and Zaroxolyn. 4. Abnormal AST, most likely secondary to febrile illness. 5. Hypokalemia, replete   6. Diabetes mellitus type 2, stable   7. Hypertension. Hold blood pressure medications for now. 8. History of gout. On allopurinol. 9. History of gastrointestinal bleed in 2016. 10. Anemia of iron-deficiency, resolved. 11. Deep venous thrombosis prophylaxis.   12. Acute metabolic encephalopathy 2/2 fever and NEPTALI   PT/OT     Filomena Chen MD  2/16/2017, 4:28 PM

## 2017-02-16 NOTE — PROGRESS NOTES
Bedside and Verbal shift change report given to Jaleesa Duarte RN (oncoming nurse) by Gustavo Carson RN (offgoing nurse). Report included the following information SBAR, Kardex, Intake/Output and MAR. Call bell in reach. 3095 Shift assessment complete as documented. Patient is not verbal, nods appropriately and follows commands well. 1558 Reassessment complete. Patient stated his name softly, otherwise not verbalizing information. No complaints or visual signs of pain, though abdomen appears to be tender to touch. Informed by PT that she knows patient from a previous admission and that the patient seems to know some sign language, that he may be deaf. Will continue to monitor and attempt conversation. 1850 IV infiltrated in right arm, removed. New IV started in left forearm, patient tolerated well. 1940 Bedside and Verbal shift change report given to Wero Abel RN (oncoming nurse) by Jaleesa Duarte RN (offgoing nurse). Report included the following information SBAR, Kardex, Intake/Output and MAR. Call bell in reach.

## 2017-02-16 NOTE — ED NOTES
Purposeful rounding completed:    Side rails up x 2:  YES  Bed in low position and wheels locked: YES  Call bell within reach: YES  Comfort addressed: YES    Toileting needs addressed: YES  Plan of care reviewed/updated with patient and or family members: YES  IV site assessed: YES  Pain assessed and addressed: YES, Patient is nonverbal. Nods his head yes when asked if having pain but unable to verbalize a pain scale number or location.

## 2017-02-16 NOTE — PROGRESS NOTES
Problem: Dysphagia (Adult)  Goal: *Acute Goals and Plan of Care (Insert Text)    Recommendations:  Diet: regular solid/thin liquids  Meds: as tolerated  Aspiration Precautions  Other: Alternate solids/liquids    Goals: Patient will:  1. Tolerate PO trials with 0 s/s overt distress in 4/5 trials  2. Utilize compensatory swallow strategies/maneuvers (decrease bite/sip, size/rate, alt. liq/sol) with min cues in 4/5 trials  Outcome: Progressing Towards Goal  SPEECH LANGUAGE PATHOLOGY BEDSIDE SWALLOW EVALUATION     Patient: Anyi Berrios (85 y.o. male)  Date: 2/16/2017  Primary Diagnosis: Febrile illness, acute  NEPTALI (acute kidney injury) ([facility] [address]        Precautions:   Fall      ASSESSMENT :  Based on the objective data described below, the patient presents with mild oropharyngeal dysphagia. Pt presented with thin + straw, pudding, and regular solid. Strong cough after initial thin liquid intake, however no other overt s/sx of aspiration with remaining liquid intake or solid textures. Mastication pattern labored in setting of limited dentition, however, pt self feeding small bites and alternating solids and liquids with 100% oral clearance. Recommending regular solid/ thin liquids with alternating solids/liquids. Posted recs at bedside. ST to follow 1-2 visits to verify diet tolerance. Patient will benefit from skilled intervention to address the above impairments. Patients rehabilitation potential is considered to be Good  Factors which may influence rehabilitation potential include:   [ ]            None noted  [X]            Mental ability/status  [X]            Medical condition  [ ]            Home/family situation and support systems  [ ]            Safety awareness  [ ]            Pain tolerance/management  [ ]            Other:        PLAN :Regular diet  Recommendations and Planned Interventions:  ST to follow up 1-2 visits for diet tolerance.   Frequency/Duration: Patient will be followed by speech-language pathology 1-2 times per day/ 4-7 times per week to address goals. Discharge Recommendations: Prior living environment       SUBJECTIVE:   Patient stated [employer]. OBJECTIVE:       Past Medical History   Diagnosis Date    [facility]congestive heart failure) ([facility] [address]      DM (diabetes mellitus) ([facility] [address]      Gout      HTN (hypertension)     History reviewed. No pertinent past surgical history. Prior Level of Function/Home Situation: Per chart  Home Situation  Home Environment: [address] [place] [address] Name: Jayson  Living Alone: No  Support Systems: Skilled nursing facility  Patient Expects to be Discharged to[de-identified] Unknown  Current DME Used/Available at Home: Taylor Romeo (unknown; patient unable to verbalize)  Tub or Shower Type:  (patient unable to verbalize)  Diet prior to admission: Regular  Current Diet:  NPO   Cognitive and Communication Status:  Neurologic State: Alert  Orientation Level: Oriented to person  Cognition: Follows commands  Perception: Appears intact  Perseveration: No perseveration noted  Safety/Judgement: Awareness of environment, Fall prevention  Oral Assessment:  Oral Assessment  Labial: No impairment  Dentition: Limited;Natural  Oral Hygiene: fair  Lingual: Incoordinated  Velum: No impairment  Mandible: No impairment  P.O. Trials:  Patient Position: [facility]  Vocal quality prior to P.O.: No impairment  Consistency Presented: Thin liquid;Puree; Solid  How Presented: Straw;Spoon  How Much:  (x4oz thin, x2oz puree, x2 solid cacker)  Bolus Acceptance: No impairment  Bolus Formation/Control: Impaired  Type of Impairment: Mastication  Propulsion: Delayed (# of seconds); Discoordination  Oral Residue: Less than 10% of bolus; Lingual  Initiation of Swallow: Delayed (# of seconds)  Laryngeal Elevation: Functional  Aspiration Signs/Symptoms: Strong cough (initial thin liquid intake, subsequent without event)  Pharyngeal Phase Characteristics: No impairment, issues, or problems   Effective Modifications: Alternate liquids/solids;Small sips and bites  Cues for Modifications: None        Oral Phase Severity: Mild  Pharyngeal Phase Severity : Mild     GCODESwallowing:  Swallow Current Status CI= 1-19%   Swallow Goal Status CI= 1-19%     The severity rating is based on the following outcomes:  MICHAEL Noms Swallow Level 6              Clinical Judgement     PAIN:  Start of Eval: 0  End of Eval: 0      After treatment:   [ ]            Patient left in no apparent distress sitting up in chair  [X]            Patient left in no apparent distress in bed  [X]            Call bell left within reach  [ ]            Nursing notified  [ ]            Family present  [ ]            Caregiver present  [ ]            Bed alarm activated      COMMUNICATION/EDUCATION:   [X]            Aspiration precautions; swallow safety; compensatory techniques. [ ]            Patient/family have participated as able in goal setting and plan of care. [ ]            Patient/family agree to work toward stated goals and plan of care. [ ]            Patient understands intent and goals of therapy; neutral about participation. [ ]            Patient unable to participate in goal setting/plan of care; educ ongoing with interdisciplinary staff  [X]            Posted safety precautions in patient's room.      Thank you for this referral.  [facility]

## 2017-02-16 NOTE — PROGRESS NOTES
Patient has designated his brother to participate in his/her discharge plan and to receive any needed information.      Name:   Agnes Klein  brother   [phone]      Feb 16, 2017        Address:  Phone number:

## 2017-02-16 NOTE — ED NOTES
Bedside shift change report received from [address] [place] [address]. Assumed care of patient.

## 2017-02-16 NOTE — PROGRESS NOTES
[facility] Dosing Services     Pharmacy dosing vancomycin for treatment of Sepsis of Unknown Etiology     Starting on a regimen of (1,000 + 1,000 mg = 2,000 mg bolus dose), then 1,250 mg every 36 hours    Day of Therapy: 0    Other Antibiotics: Levaquin and Zosyn    Labs:   Bun/Serum Creatinine - 85 / 4.84  CrCl - 16 mL/min  WBC - 17.6    Cultures:    Blood and Urine cultures taken    Plan:  Start 1,250 mg every 36 hours for goal trough of 15-20 mcg/mL. Ordered trough level on 02- at 0700. Pharmacy to follow and will make changes to dose and/or frequency based on clinical status.

## 2017-02-16 NOTE — PROGRESS NOTES
Patient is unable to communicate at this time as he is sleeping peacefully. Nacho Angel offered prayer at the door for him. Chaplains will continue to follow and will provide pastoral care on an as needed/requested basis.     Dulce Ball   Spiritual Care   [phone]

## 2017-02-16 NOTE — PROGRESS NOTES
[facility]   Discharge Planning/ Assessment    Reasons for Intervention: Attempted to interview patient, he was able to nod yes or no to questions. He confirmed his brother is his NOK and agrees to share his discharge information with him, see below. He live at [facility] where he uses a walker to assist with ambulation. He is seen by Dr Aletta Dancer for his primary care needs. Discharge plan is to return to [facility].      High Risk Criteria  [x] Yes  []No   Physician Referral  [] Yes  [x]No        Date    Nursing Referral  [] Yes  [x]No        Date    Patient/Family Request  [] Yes  [x]No        Date       Resources:    Medicare  [x] Yes  []No   Medicaid  [x] Yes  []No   No Resources  [] Yes  [x]No   Private Insurance  [] Yes  [x]No    Name/Phone Number    Other  [] Yes  [x]No        (i.e. Workman's Comp)         Prior Services:    Prior Services  [x] Yes  []No   Home Health  [] Yes  [x]No   [address]  [] Yes  [x]No        Number of [address]  [] Yes  [x]No       Meals on Wheels  [] Yes  [x]No   Office on Aging  [] Yes  [x]No   Transportation Services  [x] Yes  []No   Nursing Home  [x] Yes  []No        Nursing Home Name [address]  [] Yes  [x]No        [address] Name    Other       Information Source:      Information obtained from  [x] Patient  [] Parent   [] [address]  [] Child  [] Spouse   [] Significant Other/Partner   [] Friend      [] EMS    [] Nursing Home Chart          [x] Other: chart   Chart Review  [] Yes  []No     Family/Support System:    Patient lives with  [] Alone    [] Spouse   [] Significant Other  [] Children  [] Caretaker   [] Parent  [] Sibling     [x] Other snf      Other Support System:    Is the patient responsible for care of others  [] Yes  []No   Information of person caring for patient on  discharge consulate   Managers financial affairs independently  [] Yes  []No   If no, explain: Status Prior to Admission:    Mental Status  [x] Awake  [x] Alert  [] Oriented  [] Quiet/Calm [] Lethargic/Sedated   [] Disoriented  [] Restless/Anxious  [] Combative   Personal Care  [] Dependent  [] Independent Personal Care  [x] Requires Assistance   Meal Preparation Ability  [] Independent   [] Standby Assistance   [] Minimal Assistance   [] Moderate Assistance  [x] Maximum Assistance     [] Total Assistance   Chores  [] Independent with Chores   [] N/A Nursing Home Resident   [x] Requires Assistance   Bowel/Bladder  [] Continent  [] Catheter  [x] Incontinent  [] Ostomy Self-Care    [] Urine Diversion Self-Care  [] Maximum Assistance     [] Total Assistance   Number of Persons needed for assistance    DME at home  [] [employer]  [] [employer]   [] Commode    [] Bathroom/Grab Bars  [] Hospital Bed  [] Nebulizer  [] Oxygen           [] Raised Toilet Seat  [] Shower Chair  [] Side Rails for Bed   [] Tub Transfer Bench   [x] Walker, Rolling  [] Lonreba Leavens, Standard      [] Other:   Vendor      Treatment Presently Receiving:    Current Treatments  [] Chemotherapy  [] Dialysis  [] Insulin  [x] IVAB [x] IVF   [] O2  [] PCA   [x] PT   [] RT   [] Tube Feedings   [] Wound Care     Psychosocial Evaluation:    Verbalized Knowledge of Disease Process  [] Patient  [x]Family   Coping with Disease Process  [] Patient  [x]Family   Requires Further Counseling Coping with Disease Process  [] Patient  []Family     Identified Projected Needs:    Home Health Aid  [] Yes  [x]No   Transportation  [x] Yes  []No   Education  [] Yes  [x]No        Specific Education     Financial Counseling  [] Yes  [x]No   Inability to Care for Self/Will Require 24 hour care  [x] Yes  []No   Pain Management  [] Yes  [x]No   Home Infusion Therapy  [] Yes  [x]No   Oxygen Therapy  [] Yes  [x]No   DME  [] Yes  [x]No   Long Term Care Placement  [] Yes  [x]No   Rehab  [x] Yes  []No   Physical Therapy  [x] Yes  []No   Needs Anticipated At This Time  [x] Yes []No     Intra-Hospital Referral:    [address] [facility]  [] Yes  [x]No     [] Yes  [x]No   Patient Representative  [] Yes  [x]No   Staff for Teaching Needs  [] Yes  [x]No   Specialty Teaching Needs     Diabetic Educator  [] Yes  [x]No   Referral for Diabetic Educator Needed  [] Yes  [x]No  If Yes, place order for Nutritionist or Diabetic Consult     Tentative Discharge Plan:    Home with No Services  [] Yes  [x]No   Home with [address]  [] Yes  [x]No        If Yes, specify type    Home Care Program  [] Yes  [x]No        If Yes, specify type    Meals on Wheels  [] Yes  [x]No   Office of Aging  [] Yes  [x]No   NHP  [] Yes  [x]No   Return to the Nursing Home  [x] Yes  []No   Rehab Therapy  [x] Yes  []No   Acute Rehab  [] Yes  [x]No   Subacute Rehab  [x] Yes  []No   Private Care  [] Yes  [x]No   Substance Abuse Referral  [] Yes  [x]No   Transportation  [] Yes  [x]No   Chore Service  [] Yes  [x]No   Inpatient Hospice  [] Yes  [x]No   OP RT  [] Yes  [x] No   OP Hemo  [] Yes  [x] No   OP PT  [] Yes  [x]No   Support Group  [] Yes  [x]No   Reach to Recovery  [] Yes  [x]No   OP Oncology Clinic  [] Yes  [x]No   Clinic Appointment  [] Yes  [x]No   DME  [] Yes  [x]No   Comments    Name of D/C Planner or  Given to Patient or Family Marlene Ham RN   Phone Number [phone]   Date Feb 16, 2017   Time 751 am   If you are discharged home, whom do you designate to participate in your discharge plan and receive any information needed?      Enter name of Nasrin Buccaneer  brother        Phone # of [employer]         Address of fady         Updated Feb 16, 2017        Patient refused to designate any           individual

## 2017-02-16 NOTE — PROGRESS NOTES
Bedside swallow completed with recommendation for regular diet. Note to follow.     Thank you for this referral,  Brian Greer, SLP

## 2017-02-16 NOTE — PROGRESS NOTES
2045-Received pt from ED, pt alert, unable to verbalize words but able to knod head, on 2L NC,  Bell catheter draining, no distress noted, oriented to room, call bell within reach, bed in low position,will continue to monitor, Unable to do the required admission documentation due to [atient condition. 2100-Admission assessment completed, scheduled medications administered, eye drops not available from pharmacy. 0100-Reassessment completed, pt sleeping, medications infusing, will continue to monitor. 0500-Reassessment completed. 0630-Critical potassium level, Dr Arlington Merlin paged, orders received, will continue to monitor. Bedside and Verbal shift change report given to [address] (oncoming nurse) by Alan Hurtado RN (offgoing nurse). Report included the following information SBAR, Kardex, Intake/Output, MAR and Recent Results.

## 2017-02-16 NOTE — CDMP QUERY
Please clarify if this patient is being treated/managed for:    =>Metabolic Encephalopathy sec to fever and acute renal failure   =>Other Explanation of clinical findings  =>Unable to Determine (no explanation of clinical findings)    The medical record reflects the following clinical findings, treatment, and risk factors:  70 yom admitted with altered mental status , possible picc line infection  Temp in er 101.7, p 94, rr 25 , bp  116-57,     Wbc 17.6, Neut 91, bands 2, Lymphs 1   abx started, blood cultures, picc line dc'd and tip to lab for culture    Please clarify and document your clinical opinion in the progress notes and discharge summary including the definitive and/or presumptive diagnosis, (suspected or probable), related to the above clinical findings. Please include clinical findings supporting your diagnosis. If you DECLINE this query or would like to communicate with [facility], please utilize the \"[employer] message box\" at the TOP of the Progress Note on the right.       Thank you,    [address] [place]

## 2017-02-16 NOTE — H&P
[address]    Name:  Anitha Wise  MR#:  [id number]  :  1945  Account #:  [de-identified]  Date of Adm:  02/15/2017      REASON FOR ADMISSION: Fever and altered mental status. HISTORY OF PRESENT ILLNESS: The patient is a pleasant 67-year-  old male with past medical history significant for diabetes,  hypertension, gout, history of GI bleed in 2016, who was sent over  from nursing home secondary to fever and altered mental status. The  patient was seen and evaluated in the emergency department where  he had a negative x-ray and urinalysis; however, he was noted to be in  acute renal failure with elevated BUN and creatinine of 85 and 4.84  with a baseline BUN of 20 and a creatinine of 1.42. The patient also  noted to have leukocytosis with WBC of 17.6 with a left shift, 92%  neutrophil and 2 bandemia. The patient was started on triple antibiotics  and referred for admission. His blood pressure and lactic acid are  within normal limits. At the time of my evaluation, the patient is awake,  minimally verbal, can follow simple command. He is a poor historian,  most history obtained from the chart, no family at the bedside. PAST MEDICAL HISTORY: Diabetes, gout, hypertension, chronic  kidney disease, history of GI bleed, history of anemia secondary to  gastrointestinal bleed. PAST SURGICAL HISTORY: The patient cannot provide. SOCIAL HISTORY: He is single, is a nonsmoker. Resides at  [address] [place] [address]. FAMILY HISTORY: The patient cannot provide. ALLERGIES: NO KNOWN DRUG ALLERGIES. MEDICATIONS AT HOME  He is on:  1. Allopurinol 200 mg p.o. daily. 2. Norvasc 5 mg p.o. daily. 3. Lipitor 10 mg p.o. at bedtime. 4. Ferrous sulfate 325 mg p.o. daily. 5. Xalatan eye drop 1 drop both eyes nightly. 6. Nitroglycerin patch 0.4 mg/hour 1 daily. 7. Potassium 20 mEq daily. 8. Aldactone 50 mg daily. 9. Vitamin D 1000 units daily.   10. Alphagan eye drop 1 drop both eyes b.i.d.  11. Bumex 2 mg p.o. b.i.d.  12. Cosopt eye drop b.i.d. 13. Lantus 20 units subcutaneous daily. 14. Metformin 500 mg p.o. b.i.d.  15. Accu-Chek with sliding scale. 16. Senokot 8.6 mg p.o. b.i.d.  17. Hydralazine 50 mg t.i.d.. 18. Zaroxolyn 5 mg 3 times daily. REVIEW OF SYSTEMS: The patient cannot provide. However, he  denies pain by shaking his head. PHYSICAL EXAMINATION  GENERAL: This is a well-nourished, well-developed,   male in no apparent distress. VITAL SIGNS: T-max of 101.7, heart rate is 94, blood pressure 116/57,  saturating 97%, respiratory rate is between 19-25. HEENT: Normocephalic, atraumatic. Sclerae are anicteric. Oropharynx  clear. Mucous membranes dry. Poor dentition. NECK: No JVD or thyromegaly. HEART: S1, S2, regular rate and rhythm. LUNGS: Clear to auscultation bilaterally, no wheezing. ABDOMEN: Nontender, minimally distended. Normoactive bowel  sounds. EXTREMITIES: No edema or clubbing. Motor strength is 5/5  throughout. NEUROLOGIC: Cranial nerves are grossly intact. ANCILLARY: WBC is 17.6 with 91% neutrophils, 2% bands. BUN is  85, creatinine is 4.84, glucose 235, potassium 3.3, ALT of 41, AST of  91. Blood culture is pending. Flu test was negative. Chest x-ray shows  no acute cardiopulmonary process. EKG shows sinus rhythm with first-  degree AV block, left axis deviation. Urinalysis was negative for nitrite  and leukocyte esterase, 0-3 WBCs. ASSESSMENT AND PLAN  1. Febrile illness. Most likely source is the patient's peripherally inserted  central catheter line in the right upper extremity that was placed in a  nursing home. As his x-ray, urinalysis and flu studies were normal, will  discontinue the peripherally inserted central catheter line, start  peripheral intravenous, send the tip for culture. Continue with  Levaquin, Zosyn and vancomycin renally dosed. 2. Leukocytosis with left shift secondary to above.   3. Acute kidney injury on chronic kidney disease secondary to fever  and diuretics. Will hold Bumex and Zaroxolyn. 4. Abnormal AST, most likely secondary to febrile illness. 5. Hypokalemia. This will be repleted. 6. Diabetes mellitus type 2. Continue with sliding scale. 7. Hypertension. Hold blood pressure medications for now. 8. History of gout. On allopurinol. 9. History of gastrointestinal bleed in 2016. 10. Anemia of iron-deficiency, resolved. 11. Deep venous thrombosis prophylaxis. 12. Speech evaluation in a.m. Will hold home medications for now and  resume when the patient can tolerate a diet.         MD IMAN Sloan / LEILA  D:  02/15/2017   18:35  T:  02/15/2017   19:34  Job #:  [id number]

## 2017-02-17 ENCOUNTER — APPOINTMENT (OUTPATIENT)
Dept: GENERAL RADIOLOGY | Age: 72
DRG: 314 | End: 2017-02-17
Attending: INTERNAL MEDICINE
Payer: MEDICARE

## 2017-02-17 VITALS
HEART RATE: 74 BPM | WEIGHT: 205.1 LBS | SYSTOLIC BLOOD PRESSURE: 148 MMHG | TEMPERATURE: 97.4 F | HEIGHT: 70 IN | DIASTOLIC BLOOD PRESSURE: 76 MMHG | OXYGEN SATURATION: 98 % | RESPIRATION RATE: 20 BRPM | BODY MASS INDEX: 29.36 KG/M2

## 2017-02-17 LAB
ANION GAP BLD CALC-SCNC: 10 MMOL/L (ref 3–18)
ATRIAL RATE: 96 BPM
BACTERIA SPEC CULT: NORMAL
BASOPHILS # BLD AUTO: 0 K/UL (ref 0–0.06)
BASOPHILS # BLD: 0 % (ref 0–2)
BUN SERPL-MCNC: 56 MG/DL (ref 7–18)
BUN/CREAT SERPL: 22 (ref 12–20)
CALCIUM SERPL-MCNC: 8.9 MG/DL (ref 8.5–10.1)
CALCULATED P AXIS, ECG09: 67 DEGREES
CALCULATED R AXIS, ECG10: -68 DEGREES
CALCULATED T AXIS, ECG11: 112 DEGREES
CHLORIDE SERPL-SCNC: 112 MMOL/L (ref 100–108)
CO2 SERPL-SCNC: 24 MMOL/L (ref 21–32)
CREAT SERPL-MCNC: 2.57 MG/DL (ref 0.6–1.3)
DIAGNOSIS, 93000: NORMAL
DIFFERENTIAL METHOD BLD: ABNORMAL
EOSINOPHIL # BLD: 0.1 K/UL (ref 0–0.4)
EOSINOPHIL NFR BLD: 1 % (ref 0–5)
ERYTHROCYTE [DISTWIDTH] IN BLOOD BY AUTOMATED COUNT: 16.7 % (ref 11.6–14.5)
GLUCOSE BLD STRIP.AUTO-MCNC: 118 MG/DL (ref 70–110)
GLUCOSE BLD STRIP.AUTO-MCNC: 174 MG/DL (ref 70–110)
GLUCOSE SERPL-MCNC: 101 MG/DL (ref 74–99)
HCT VFR BLD AUTO: 40.3 % (ref 36–48)
HGB BLD-MCNC: 12.6 G/DL (ref 13–16)
LYMPHOCYTES # BLD AUTO: 11 % (ref 21–52)
LYMPHOCYTES # BLD: 0.9 K/UL (ref 0.9–3.6)
MAGNESIUM SERPL-MCNC: 2.3 MG/DL (ref 1.8–2.4)
MCH RBC QN AUTO: 27.1 PG (ref 24–34)
MCHC RBC AUTO-ENTMCNC: 31.3 G/DL (ref 31–37)
MCV RBC AUTO: 86.7 FL (ref 74–97)
MONOCYTES # BLD: 0.4 K/UL (ref 0.05–1.2)
MONOCYTES NFR BLD AUTO: 5 % (ref 3–10)
NEUTS SEG # BLD: 7.2 K/UL (ref 1.8–8)
NEUTS SEG NFR BLD AUTO: 83 % (ref 40–73)
P-R INTERVAL, ECG05: 212 MS
PLATELET # BLD AUTO: 92 K/UL (ref 135–420)
PMV BLD AUTO: 10.4 FL (ref 9.2–11.8)
POTASSIUM SERPL-SCNC: 3.6 MMOL/L (ref 3.5–5.5)
Q-T INTERVAL, ECG07: 406 MS
QRS DURATION, ECG06: 138 MS
QTC CALCULATION (BEZET), ECG08: 512 MS
RBC # BLD AUTO: 4.65 M/UL (ref 4.7–5.5)
SERVICE CMNT-IMP: NORMAL
SODIUM SERPL-SCNC: 146 MMOL/L (ref 136–145)
VENTRICULAR RATE, ECG03: 96 BPM
WBC # BLD AUTO: 8.7 K/UL (ref 4.6–13.2)

## 2017-02-17 PROCEDURE — 36415 COLL VENOUS BLD VENIPUNCTURE: CPT | Performed by: INTERNAL MEDICINE

## 2017-02-17 PROCEDURE — 74011250637 HC RX REV CODE- 250/637: Performed by: INTERNAL MEDICINE

## 2017-02-17 PROCEDURE — 71010 XR CHEST PORT: CPT

## 2017-02-17 PROCEDURE — 92526 ORAL FUNCTION THERAPY: CPT

## 2017-02-17 PROCEDURE — 83735 ASSAY OF MAGNESIUM: CPT | Performed by: INTERNAL MEDICINE

## 2017-02-17 PROCEDURE — 74011000250 HC RX REV CODE- 250: Performed by: INTERNAL MEDICINE

## 2017-02-17 PROCEDURE — 82962 GLUCOSE BLOOD TEST: CPT

## 2017-02-17 PROCEDURE — 80048 BASIC METABOLIC PNL TOTAL CA: CPT | Performed by: INTERNAL MEDICINE

## 2017-02-17 PROCEDURE — 74011250636 HC RX REV CODE- 250/636: Performed by: EMERGENCY MEDICINE

## 2017-02-17 PROCEDURE — 77030020263 HC SOL INJ SOD CL0.9% LFCR 1000ML

## 2017-02-17 PROCEDURE — 74011250636 HC RX REV CODE- 250/636: Performed by: INTERNAL MEDICINE

## 2017-02-17 PROCEDURE — 85025 COMPLETE CBC W/AUTO DIFF WBC: CPT | Performed by: INTERNAL MEDICINE

## 2017-02-17 PROCEDURE — 74011000258 HC RX REV CODE- 258: Performed by: EMERGENCY MEDICINE

## 2017-02-17 RX ORDER — LEVOFLOXACIN 500 MG/1
500 TABLET, FILM COATED ORAL DAILY
Qty: 5 TAB | Refills: 0 | Status: ON HOLD
Start: 2017-02-17 | End: 2017-07-15

## 2017-02-17 RX ORDER — INSULIN LISPRO 100 [IU]/ML
INJECTION, SOLUTION INTRAVENOUS; SUBCUTANEOUS
Qty: 1 VIAL | Refills: 0 | Status: ON HOLD
Start: 2017-02-17 | End: 2017-07-15

## 2017-02-17 RX ADMIN — SODIUM CHLORIDE 125 ML/HR: 900 INJECTION, SOLUTION INTRAVENOUS at 03:44

## 2017-02-17 RX ADMIN — POTASSIUM CHLORIDE 20 MEQ: 20 TABLET, EXTENDED RELEASE ORAL at 09:52

## 2017-02-17 RX ADMIN — FERROUS SULFATE TAB 325 MG (65 MG ELEMENTAL FE) 325 MG: 325 (65 FE) TAB at 09:52

## 2017-02-17 RX ADMIN — BRIMONIDINE TARTRATE 1 DROP: 2 SOLUTION/ DROPS OPHTHALMIC at 09:51

## 2017-02-17 RX ADMIN — SODIUM CHLORIDE 1250 MG: 900 INJECTION, SOLUTION INTRAVENOUS at 10:11

## 2017-02-17 RX ADMIN — PIPERACILLIN SODIUM,TAZOBACTAM SODIUM 2.25 G: 2; .25 INJECTION, POWDER, FOR SOLUTION INTRAVENOUS at 04:21

## 2017-02-17 RX ADMIN — DORZOLAMIDE HYDROCHLORIDE AND TIMOLOL MALEATE 1 DROP: 20; 5 SOLUTION/ DROPS OPHTHALMIC at 09:52

## 2017-02-17 NOTE — PROGRESS NOTES
Problem: Mobility Impaired (Adult and Pediatric)  Goal: *Acute Goals and Plan of Care (Insert Text)  Physical Therapy Goals  Initiated 2/16/2017 and to be accomplished within 7 day(s)  1. Patient will move from supine to sit and sit to supine , scoot up and down and roll side to side in bed with modified independence. 2. Patient will transfer from bed to chair and chair to bed with supervision/set-up using the least restrictive device. 3. Patient will perform sit to stand with supervision/set-up. 4. Patient will ambulate with minimal assistance/contact guard assist for 50 feet with the least restrictive device. Outcome: Progressing Towards Goal  PHYSICAL THERAPY EVALUATION     Patient: Renaye Brittle (84 y.o. male)  Date: 2/16/2017  Primary Diagnosis: Febrile illness, acute  NEPTALI (acute kidney injury) [facility])     Precautions:   Fall      PROBLEM LIST:  Patient presents with the following problems:   Bed Mobility, Transfers, Gait, Strength, Balance and Precautions  ASSESSMENT :   Patient requires between minimal assistance/contact guard assist and maximal assistance for bed mobility, transfers  unable to come to full standing keeping hip flex. Nods yes to pain in stomach and left leg. Unable to give a number . Left calf warm to touch and patient grimaces with tactile stim to calf  Nursing informed of left calf. . Patient able to sign with therapist simple signs due to therapist decreased knowledge    Left in bed with call light in reach. Education on plan of care and ankle pumps needs reinforcement   Patient will benefit from skilled intervention to address the above impairments.   Patients rehabilitation potential is considered to be Fair  Factors which may influence rehabilitation potential include:   [ ]         None noted  [ ]         Mental ability/status  [X]         Medical condition  [ ]         Home/family situation and support systems  [ ]         Safety awareness  [ ]         Pain tolerance/management  [ ]         Other:        PLAN :  Recommendations and Planned Interventions:  [X]           Bed Mobility Training             [X]    Neuromuscular Re-Education  [X]           Transfer Training                   [ ]    Orthotic/Prosthetic Training  [X]           Gait Training                          [ ]    Modalities  [X]           Therapeutic Exercises          [ ]    Edema Management/Control  [X]           Therapeutic Activities            [X]    Patient and Family Training/Education  [ ]           Other (comment):     Frequency/Duration: Patient will be followed by physical therapy 3-5 times a week to address goals. Discharge Recommendations: Rehab and [facility]  Further Equipment Recommendations for Discharge: tbd        SUBJECTIVE:   Patient stated .      OBJECTIVE DATA SUMMARY:       Past Medical History   Diagnosis Date    [facility]      DM (diabetes mellitus) [facility]      Gout      HTN (hypertension)     History reviewed. No pertinent past surgical history. Barriers to Learning/Limitations: yes;  sensory deficits-vision/hearing/speech and physical  Compensate with: visual, verbal, tactile, kinesthetic cues/model     G CODES:Mobility  Current  CK= 40-59%   Goal  CJ= 20-39%. The severity rating is based on the Other [employer] Sitting Balance Scale2+/5   [place] Controls Balance Scale2+/5  0: Pt performs 25% or less of sitting activity (Max assist) CN, 100% impaired. 1: Pt supports self with upper extremities but requires therapist assistance. Pt performs 25-50% of effort (Mod assist) CM, 80% to <100% impaired. 1+: Pt supports self with upper extremities but requires therapist assistance. Pt performs >50% effort. (Min assist). CL, 60% to <80% impaired. 2: Pt supports self independently with both upper extremities. CL, 60% to <80% impaired. 2+: Pt support self independently with 1 upper extremity.  CK, 40% to <60% impaired. 3: Pt sits without upper extremity support for up to 30 seconds. CK, 40% to <60% impaired. 3+: Pt sits without upper extremity support for 30 seconds or greater. CJ, 20% to <40% impaired. 4: Pt moves and returns trunkal midpoint 1-2 inches in one plane. CJ, 20% to <40% impaired. 4+: Pt moves and returns trunkal midpoint 1-2 inches in multiple planes. CI, 1% to <20% impaired. 5: Pt moves and returns trunkal midpoint in all planes greater than 2 inches. CH, 0% impaired. Eval Complexity: History: HIGH Complexity :3+ comorbidities / personal factors will impact the outcome/ POC Exam:MEDIUM Complexity : 3 Standardized tests and measures addressing body structure, function, activity limitation and / or participation in recreation  Presentation: MEDIUM Complexity : Evolving with changing characteristics  Clinical Decision Making:Medium [address] Sitting Balance Scale2+/5 Overall Complexity:MEDIUM     Prior Level of Function/Home Situation: unknown living at [address] [facility] Name: [facility]  Living Alone: No  Support Systems: Skilled nursing facility  Patient Expects to be Discharged to[de-identified] Unknown  Current DME Used/Available at Home: Rocha Ashlyn (unknown; patient unable to verbalize)  Tub or Shower Type:  (patient unable to verbalize)  Critical Behavior:  Neurologic State: Alert  Orientation Level: Oriented to person; Unable to verbalize  Cognition: Follows commands  Safety/Judgement: Fall prevention  Psychosocial  Patient Behaviors: Calm; Cooperative  Purposeful Interaction: Yes  Pt Identified Daily Priority: Clinical issues (comment)  Caritas Process: Nurture loving kindness;Enable rosalina/hope;Establish trust;Nurture spiritual self;Teaching/learning; Attend basic human needs  Caring Interventions: Therapeutic modalities; Reassure  Reassure: Therapeutic listening; Informing; Acceptance  Therapeutic Modalities: Intentional therapeutic touch  Skin Condition/Temp: Warm;Dry  Skin Integumentary  Skin Color: Appropriate for ethnicity  Skin Condition/Temp: Warm;Dry  Turgor: Leathery  Strength: Tone & Sensation:   Tone: Normal  Sensation: Intact  Range Of Motion:  AROM: Generally decreased, functional (end ranges of hips and knees )  PROM: Within functional limits  Functional Mobility:  Bed Mobility:  Rolling: Contact guard assistance; Additional time;Assist x1  Supine to Sit: Moderate assistance; Additional time;Assist x2;Assist x1  Sit to Supine: Moderate assistance; Additional time;Assist x1;Assist x2  Scooting: Moderate assistance; Additional time;Assist x1;Assist x2  Transfers:  Sit to Stand: Moderate assistance;Maximum assistance; Additional time;Assist x2  Stand to Sit: Moderate assistance;Maximum assistance; Additional time;Assist x1;Assist x2  Balance:   Sitting: Impaired  Sitting - Static: Fair (occasional)  Sitting - Dynamic: Fair (occasional)  Standing: Impaired  Standing - Static: Poor ( unable to come to full standing staying in flexed posture )  Standing - Dynamic : None  Ambulation/Gait Training:  Gait Description (WDL):  (unable to test)  Therapeutic Exercises:   Heel slides ankle ;pumps   Pain:  Pre treatment pain level: unable to give a number   Post treatment pain level:unable tot give a number   Pain Scale 1: Numeric (0 - 10)  Pain Intensity 1: 0  Activity Tolerance:   Fair   Please refer to the flowsheet for vital signs taken during this treatment. After treatment:   [ ]         Patient left in no apparent distress sitting up in chair  [X]         Patient left in no apparent distress in bed  [X]         Call bell left within reach  [X]         Nursing notified  [ ]         Caregiver present  [ ]         Bed alarm activated      COMMUNICATION/EDUCATION:   [X]         Fall prevention education was provided and the patient/caregiver indicated understanding.   [ ]         Patient/family have participated as able in goal setting and plan of care. [ ]         Patient/family agree to work toward stated goals and plan of care. [X]         Patient understands intent and goals of therapy, but is neutral about his/her participation. [ ]         Patient is unable to participate in goal setting and plan of care. Patient educated on the role of physical therapy during the acute stay  and the importance of mobility. needs reinforcement      Thank you for this referral.  Timothy Adler, PT   Time Calculation: 24 mins

## 2017-02-17 NOTE — ROUTINE PROCESS
1920  Bedside and Verbal shift change report given to [address] RN by Brooks Kingston RN. Report included the following information SBAR, Kardex, Intake/Output and MAR.

## 2017-02-17 NOTE — PROGRESS NOTES
Care Management Interventions  PCP Verified by CM:  Yes  Palliative Care Consult (Criteria: CHF and RRAT>21): No  Reason for No Palliative Care Consult: Patient declined palliative services at this time  Mode of Transport at Discharge: BLS  Transition of Care Consult (CM Consult): SNF  MyChart Signup: No  Discharge Durable Medical Equipment: No  Physical Therapy Consult: Yes  Occupational Therapy Consult: Yes  Speech Therapy Consult: Yes  Current Support Network: Nursing Facility  Confirm Follow Up Transport: Other (see comment)  Plan discussed with Pt/Family/Caregiver: Yes  Freedom of Choice Offered: Yes  Discharge Location  Discharge Placement: Skilled nursing facility

## 2017-02-17 NOTE — PROGRESS NOTES
Problem: Dysphagia (Adult)  Goal: *Acute Goals and Plan of Care (Insert Text)    Recommendations:  Diet: regular solid/thin liquids  Meds: as tolerated  Aspiration Precautions  Other: Alternate solids/liquids    Goals: Patient will:  1. Tolerate PO trials with 0 s/s overt distress in 4/5 trials  2. Utilize compensatory swallow strategies/maneuvers (decrease bite/sip, size/rate, alt. liq/sol) with min cues in 4/5 trials         Outcome: Resolved/Met Date Met:  02/17/17  SPEECH LANGUAGE PATHOLOGY DYSPHAGIA TREATMENT & DISCHARGE     Patient: Mecca Horner (6 y.o. male)  Date: 2/17/2017  Diagnosis: Febrile illness, acute  NEPTALI (acute kidney injury) ([facility] [address] <principal problem not specified>       Precautions: aspiration Fall      ASSESSMENT:  Follow up this am with skilled meal assessment; regular solid/thin liquid b/f tray. Pt able to feed self with encouragement. Mildly labored oral bolus prep; however, with increased time and liquid wash, pt with 100% oral clearance. Mildly delayed swallow reflex; however adequate strength to clear without aspiration s/sx. Maximum therapeutic gains met; safest, least restrictive diet achieved in current in-patient/acute setting. Accordingly, SLP to d/c intervention at this time. Results discussed with interdisciplinary rounds team.      Progression toward goals:  [X]         Improving appropriately - goals met/approximated  [ ]         Not making progress/Not appropriate - will d/c POC       PLAN:  Recommendations and Planned Interventions:  Maximum therapeutic gains met; safest, least restrictive diet achieved. D/C ST intervention at this time. Discharge Recommendations:  None for Speech therapy        SUBJECTIVE:   Patient stated no.       OBJECTIVE:   Cognitive and Communication Status:  Neurologic State: Alert  Orientation Level: Oriented to person  Cognition: Follows commands  Perception: Appears intact  Perseveration: No perseveration noted  Safety/Judgement: Fall prevention  Dysphagia Treatment:  Oral Assessment:  Oral Assessment  Labial: No impairment  Dentition: Limited  Oral Hygiene: Fair  Lingual: Decreased rate, Incoordinated  Velum: No impairment  Mandible: No impairment  P.O. Trials:              Patient Position: [id number]              Vocal quality prior to P.O.: Low volume              Consistency Presented: Thin liquid, Solid              How Presented: Self-fed/presented              How Much:  (x4oz thin, x2oz puree, x2 solid cacker)              Bolus Acceptance: No impairment              Bolus Formation/Control: Impaired              Type of Impairment: Delayed, Mastication              Propulsion: Delayed (# of seconds)              Oral Residue: Less than 10% of bolus, Lingual              Initiation of Swallow: Delayed (# of seconds)              Laryngeal Elevation: Functional              Aspiration Signs/Symptoms: None              Pharyngeal Phase Characteristics: No impairment, issues, or problems               Effective Modifications: Small sips and bites              Cues for Modifications: Minimal                                Oral Phase Severity: Mild              Pharyngeal Phase Severity : Minimal                    PAIN:  Start of Tx: 0  End of Tx: 0      After treatment:   [ ]              Patient left in no apparent distress sitting up in chair  [X]              Patient left in no apparent distress in bed  [X]              Call bell left within reach  [X]              Nursing notified  [ ]              Caregiver present  [ ]              Bed alarm activated         COMMUNICATION/EDUCATION:   [X]        Aspiration precautions; swallow safety; compensatory techniques  [X]        Patient unable to participate in education; education ongoing with staff  [ ]         Posted safety precautions in patient's room.   [ ]         Oral-motor/laryngeal strengthening exercises     RUSSELL Mathis  Time Calculation: 13 mins

## 2017-02-17 NOTE — PROGRESS NOTES
PT, OT, and ST evaluations entered into e discharge. Patient is a resident of [facility] and will return there on discharge.   Migue Moralez

## 2017-02-17 NOTE — PROGRESS NOTES
Spoke to the patient brother and informed him that the patient will return to [facility] today. PCS form and check list to nurse station. DC summary in  [address] [place] [address]. Medical transport wit [facility] arranged for  430 pm   time.   Zhanna Centeno RN

## 2017-02-18 LAB
BACTERIA SPEC CULT: NORMAL
SERVICE CMNT-IMP: NORMAL

## 2017-02-18 NOTE — ROUTINE PROCESS
0810-Assessment completed, call bell within reach, no distress noted, voiced no complaints. 1011-am medications given. 1230-no change in condition, no distress noted. 1300-discontinued maher cath per order. 1400-resting quietly in bed, no change, no distress noted. 1600-report called to Polina at [facility]. 1630-discontinued IV, patient incontinent  of  Urine. Cleaned. Discharged via ambulance.

## 2017-02-21 LAB
BACTERIA SPEC CULT: NORMAL
BACTERIA SPEC CULT: NORMAL
SERVICE CMNT-IMP: NORMAL
SERVICE CMNT-IMP: NORMAL

## 2017-03-10 NOTE — PROGRESS NOTES
Problem: Self Care Deficits Care Plan (Adult)  Goal: *Acute Goals and Plan of Care (Insert Text)  Occupational Therapy Goals  Initiated 2/16/2017 within 7 day(s). 1. Patient will perform grooming tasks with modified independence. 2. Patient will perform upper body dressing with supervision/set-up. 3. Patient will perform lower body dressing with supervision/set-up. 4. Patient will perform toilet transfers with modified independence. 5. Patient will perform all aspects of toileting with modified independence. 6. Patient will participate in upper extremity therapeutic exercise/activities with supervision/set-up for 8 minutes to increase/maintain strength/endurance of BUEs for ADLs. 7. Patient will utilize energy conservation techniques during functional activities with verbal cues. Outcome: Progressing Towards Goal  OCCUPATIONAL THERAPY EVALUATION     Patient: Felicitas Lucero (81 y.o. male)  Date: 2/16/2017  Primary Diagnosis: Febrile illness, acute  NEPTALI (acute kidney injury) [facility])        Precautions:  Fall      ASSESSMENT :  Based on the objective data described below, the patient presents with impairments with regard to bed mobility in preparation for ADLs, BUE function/strength, activity tolerance, and level of independence in ADLs. Patient followed simple commands t/o session but unable to provide history of PLOF and no family present. Patient c/o 10/10 pain in abdomen (using hand signals) upon arrival, however, no grimacing/moaning noted during bed mobility. Patient maneuvered to EOB with min/mod A. Max A to don socks while sitting EOB. Patient required mod A to stand but was unable to stand fully erect. Patient guided back to bed; during transition from EOB --> supine, patient moaned in pain; BENJAMIN Huang present and verbalized understanding. Patient left with needs within reach. Patient will benefit from skilled OT services during acute care stay to increase independence in ADLs.  Patient educated on role of OT and POC, he indicated understanding by nodding. Patient will benefit from skilled intervention to address the above impairments. Patients rehabilitation potential is considered to be Good  Factors which may influence rehabilitation potential include:   [ ]             None noted  [ ]             Mental ability/status  [X]             Medical condition  [ ]             Home/family situation and support systems  [ ]             Safety awareness  [ ]             Pain tolerance/management  [ ]             Other:      Recommendations for nursing: Meals at EOB (pending speech eval)  Written on communication board: No  Verbally communicated to: BENJAMIN Huang          PLAN :  Recommendations and Planned Interventions:  [X]               Self Care Training                  [X]        Therapeutic Activities  [X]               Functional Mobility Training    [ ]        Cognitive Retraining  [X]               Therapeutic Exercises           [X]        Endurance Activities  [X]               Balance Training                   [ ]        Neuromuscular Re-Education  [ ]               Visual/Perceptual Training     [X]   Home Safety Training  [X]               Patient Education                 [X]        Family Training/Education  [ ]               Other (comment):     Frequency/Duration: Patient will be followed by occupational therapy 3-5 times a week to address goals. Discharge Recommendations: Dimitri Rodriguez  Further Equipment Recommendations for Discharge: TBD        SUBJECTIVE:   Patient indicated pain level in abdomen using hand signals; did not verbalize. OBJECTIVE DATA SUMMARY:       Past Medical History   Diagnosis Date    [facility]est[facility]) [facility] [address]      DM (diabetes mellitus) ([facility] [address]      Gout      HTN (hypertension)     History reviewed. No pertinent past surgical history.   Barriers to Learning/Limitations: yes;  sensory deficits-vision/hearing/speech  Compensate with: visual, verbal, tactile, kinesthetic cues/model     GCODES:  Self Care  Current  CK= 40-59%   Goal  CI= 1-19%. The severity rating is based on the Other Functional Assessment, MMT, ROM     Eval Complexity: History: LOW Complexity : Brief history review ; Examination: MEDIUM Complexity : 3-5 performance deficits relating to physical, cognitive , or psychosocial skils that result in activity limitations and / or participation restrictions; Decision Making:MEDIUM Complexity : Patient may present with comorbidities that affect occupational performnce. Miniml to moderate modification of tasks or assistance (eg, physical or verbal ) with assesment(s) is necessary to enable patient to complete evaluation      Prior Level of Function/Home Situation: Per chart review, patient was residing at [facility] and walking with RW PTA. Unable to achieve past level of independence in ADLs secondary to decreased verbal communication. Home Situation  Home Environment: [address] [place] [address] Name: Jayson  Living Alone: No  Support Systems: Skilled nursing facility  Patient Expects to be Discharged to[de-identified] Unknown  Current DME Used/Available at Home: Elwanda Bela (unknown; patient unable to verbalize)  Tub or Shower Type:  (patient unable to verbalize)  [X]  Right hand dominant          [ ]  Left hand dominant  Cognitive/Behavioral Status:  Neurologic State: Alert  Orientation Level: Oriented X4  Cognition: Follows commands  Safety/Judgement: Awareness of environment; Fall prevention      Skin: Intact (BUEs)  Edema: None noted (BUEs)     Vision/Perceptual:    Acuity:  (unable to assess secondary to decreased communication)       Coordination:  Coordination: Within functional limits (BUEs)  Fine Motor Skills-Upper: Right Intact; Left Intact    Gross Motor Skills-Upper: Right Intact; Left Intact      Balance:  Sitting: Impaired  Sitting - Static: Fair (occasional)  Sitting - Dynamic: Fair (occasional) ([place])  Standing: Impaired  Standing - Static: Poor  Standing - Dynamic : None     Strength:  Strength: Generally decreased, functional (BUEs: approx 3+/5)     Tone & Sensation:  Tone: Normal (BUEs)  Sensation: Intact (BUEs)     Range of Motion:  AROM: Generally decreased, functional (BUEs: approx 3/4 range shoulder flexion)  PROM: Within functional limits (BUEs)     Functional Mobility and Transfers for ADLs:  Bed Mobility:  Rolling: Contact guard assistance; Additional time  Supine to Sit: Moderate assistance; Additional time  Sit to Supine: Moderate assistance; Additional time  Scooting: Moderate assistance; Additional time      Transfers:  Sit to Stand: Moderate assistance (unable to achieve full standing position)              Toilet Transfer :  (not assessed)                ADL Assessment:  Feeding: Moderate assistance  Oral Facial Hygiene/Grooming: Setup;Supervision  Bathing: Maximum assistance  Upper Body Dressing: Minimum assistance  Lower Body Dressing: Maximum assistance  Toileting: Total assistance (maher catheter)     Cognitive Retraining  Safety/Judgement: Awareness of environment; Fall prevention        Pain:  Pre treatment pain level: 10/10  Post treatment pain level 10/10  Pain Scale 1: Numeric (0 - 10)  Pain Location: Abdomen     Activity Tolerance:  Fair  Please refer to the flowsheet for vital signs taken during this treatment. After treatment:   [ ] Patient left in no apparent distress sitting up in chair  [X] Patient left in no apparent distress in bed  [X] Call bell left within reach  [X] Nursing notified  [ ] Caregiver present  [ ] Bed alarm activated      COMMUNICATION/EDUCATION: Patient educated on role of OT and POC. He indicated understanding but unable to verbalize. [ ] Home safety education was provided and the patient/caregiver indicated understanding. [X] Patient/family have participated as able in goal setting and plan of care.   [X] Patient/family agree to work toward stated goals and plan of care. [ ] Patient understands intent and goals of therapy, but is neutral about his/her participation. [ ] Patient is unable to participate in goal setting and plan of care.      Thank you for this referral.     Castillo Soni MS OTR/L  Time Calculation: 18 mins no vertigo/no hearing difficulty/no ear pain/no tinnitus

## 2017-07-15 ENCOUNTER — APPOINTMENT (OUTPATIENT)
Dept: CT IMAGING | Age: 72
DRG: 377 | End: 2017-07-15
Attending: EMERGENCY MEDICINE
Payer: MEDICARE

## 2017-07-15 ENCOUNTER — HOSPITAL ENCOUNTER (INPATIENT)
Age: 72
LOS: 13 days | Discharge: NURSING, REHAB OR LONG-TERM CARE | DRG: 377 | End: 2017-07-28
Attending: EMERGENCY MEDICINE | Admitting: HOSPITALIST
Payer: MEDICARE

## 2017-07-15 ENCOUNTER — APPOINTMENT (OUTPATIENT)
Dept: GENERAL RADIOLOGY | Age: 72
DRG: 377 | End: 2017-07-15
Attending: EMERGENCY MEDICINE
Payer: MEDICARE

## 2017-07-15 DIAGNOSIS — N39.0 ACUTE UTI: ICD-10-CM

## 2017-07-15 DIAGNOSIS — K92.2 GASTROINTESTINAL HEMORRHAGE, UNSPECIFIED GASTROINTESTINAL HEMORRHAGE TYPE: Primary | ICD-10-CM

## 2017-07-15 DIAGNOSIS — R50.9 FEBRILE ILLNESS, ACUTE: ICD-10-CM

## 2017-07-15 DIAGNOSIS — D64.9 LOW HEMOGLOBIN: ICD-10-CM

## 2017-07-15 PROBLEM — R41.82 ALTERED MENTAL STATUS: Status: ACTIVE | Noted: 2017-07-15

## 2017-07-15 PROBLEM — N18.30 CKD (CHRONIC KIDNEY DISEASE) STAGE 3, GFR 30-59 ML/MIN (HCC): Status: ACTIVE | Noted: 2017-07-15

## 2017-07-15 LAB
ALBUMIN SERPL BCP-MCNC: 1.1 G/DL (ref 3.4–5)
ALBUMIN/GLOB SERPL: 0.4 {RATIO} (ref 0.8–1.7)
ALP SERPL-CCNC: 77 U/L (ref 45–117)
ALT SERPL-CCNC: 12 U/L (ref 16–61)
AMORPH CRY URNS QL MICRO: ABNORMAL
ANION GAP BLD CALC-SCNC: 10 MMOL/L (ref 3–18)
APPEARANCE UR: ABNORMAL
APTT PPP: 40.8 SEC (ref 23–36.4)
AST SERPL W P-5'-P-CCNC: 17 U/L (ref 15–37)
BACTERIA URNS QL MICRO: ABNORMAL /HPF
BASOPHILS # BLD AUTO: 0 K/UL (ref 0–0.1)
BASOPHILS # BLD: 0 % (ref 0–3)
BILIRUB SERPL-MCNC: 0.4 MG/DL (ref 0.2–1)
BILIRUB UR QL: NEGATIVE
BUN SERPL-MCNC: 52 MG/DL (ref 7–18)
BUN/CREAT SERPL: 22 (ref 12–20)
CA-I SERPL-SCNC: 0.79 MMOL/L (ref 1.12–1.32)
CALCIUM SERPL-MCNC: <5 MG/DL (ref 8.5–10.1)
CHLORIDE SERPL-SCNC: 130 MMOL/L (ref 100–108)
CO2 SERPL-SCNC: 12 MMOL/L (ref 21–32)
COLOR UR: ABNORMAL
CREAT SERPL-MCNC: 2.41 MG/DL (ref 0.6–1.3)
DIFFERENTIAL METHOD BLD: ABNORMAL
EOSINOPHIL # BLD: 0 K/UL (ref 0–0.4)
EOSINOPHIL NFR BLD: 0 % (ref 0–5)
EPITH CASTS URNS QL MICRO: ABNORMAL /LPF (ref 0–5)
ERYTHROCYTE [DISTWIDTH] IN BLOOD BY AUTOMATED COUNT: 18 % (ref 11.6–14.5)
GLOBULIN SER CALC-MCNC: 2.8 G/DL (ref 2–4)
GLUCOSE BLD STRIP.AUTO-MCNC: 113 MG/DL (ref 70–110)
GLUCOSE BLD STRIP.AUTO-MCNC: 197 MG/DL (ref 70–110)
GLUCOSE SERPL-MCNC: 100 MG/DL (ref 74–99)
GLUCOSE UR STRIP.AUTO-MCNC: NEGATIVE MG/DL
HCT VFR BLD AUTO: 21.4 % (ref 36–48)
HGB BLD-MCNC: 6.3 G/DL (ref 13–16)
HGB UR QL STRIP: ABNORMAL
INR PPP: 2.3 (ref 0.8–1.2)
KETONES UR QL STRIP.AUTO: NEGATIVE MG/DL
LACTATE BLD-SCNC: 0.8 MMOL/L (ref 0.4–2)
LEUKOCYTE ESTERASE UR QL STRIP.AUTO: ABNORMAL
LYMPHOCYTES # BLD AUTO: 4 % (ref 20–51)
LYMPHOCYTES # BLD: 0.3 K/UL (ref 0.8–3.5)
MCH RBC QN AUTO: 24.1 PG (ref 24–34)
MCHC RBC AUTO-ENTMCNC: 29.4 G/DL (ref 31–37)
MCV RBC AUTO: 82 FL (ref 74–97)
MONOCYTES # BLD: 0.4 K/UL (ref 0–1)
MONOCYTES NFR BLD AUTO: 5 % (ref 2–9)
NEUTS BAND NFR BLD MANUAL: 1 % (ref 0–5)
NEUTS SEG # BLD: 7.8 K/UL (ref 1.8–8)
NEUTS SEG NFR BLD AUTO: 90 % (ref 42–75)
NITRITE UR QL STRIP.AUTO: NEGATIVE
PH UR STRIP: 5 [PH] (ref 5–8)
PLATELET # BLD AUTO: 135 K/UL (ref 135–420)
PLATELET COMMENTS,PCOM: ABNORMAL
PMV BLD AUTO: 9.2 FL (ref 9.2–11.8)
POTASSIUM SERPL-SCNC: 3.6 MMOL/L (ref 3.5–5.5)
PROT SERPL-MCNC: 3.9 G/DL (ref 6.4–8.2)
PROT UR STRIP-MCNC: 30 MG/DL
PROTHROMBIN TIME: 23.9 SEC (ref 11.5–15.2)
RBC # BLD AUTO: 2.61 M/UL (ref 4.7–5.5)
RBC #/AREA URNS HPF: ABNORMAL /HPF (ref 0–5)
RBC MORPH BLD: ABNORMAL
RBC MORPH BLD: ABNORMAL
SODIUM SERPL-SCNC: 152 MMOL/L (ref 136–145)
SP GR UR REFRACTOMETRY: 1.02 (ref 1–1.03)
UROBILINOGEN UR QL STRIP.AUTO: 1 EU/DL (ref 0.2–1)
WBC # BLD AUTO: 8.7 K/UL (ref 4.6–13.2)
WBC URNS QL MICRO: ABNORMAL /HPF (ref 0–4)

## 2017-07-15 PROCEDURE — 74011000258 HC RX REV CODE- 258: Performed by: EMERGENCY MEDICINE

## 2017-07-15 PROCEDURE — 71010 XR CHEST PORT: CPT

## 2017-07-15 PROCEDURE — 96375 TX/PRO/DX INJ NEW DRUG ADDON: CPT

## 2017-07-15 PROCEDURE — 87077 CULTURE AEROBIC IDENTIFY: CPT | Performed by: EMERGENCY MEDICINE

## 2017-07-15 PROCEDURE — 82962 GLUCOSE BLOOD TEST: CPT

## 2017-07-15 PROCEDURE — 82330 ASSAY OF CALCIUM: CPT | Performed by: EMERGENCY MEDICINE

## 2017-07-15 PROCEDURE — 77030034849

## 2017-07-15 PROCEDURE — 36415 COLL VENOUS BLD VENIPUNCTURE: CPT | Performed by: EMERGENCY MEDICINE

## 2017-07-15 PROCEDURE — P9016 RBC LEUKOCYTES REDUCED: HCPCS | Performed by: EMERGENCY MEDICINE

## 2017-07-15 PROCEDURE — 85025 COMPLETE CBC W/AUTO DIFF WBC: CPT | Performed by: EMERGENCY MEDICINE

## 2017-07-15 PROCEDURE — 96374 THER/PROPH/DIAG INJ IV PUSH: CPT

## 2017-07-15 PROCEDURE — 65660000000 HC RM CCU STEPDOWN

## 2017-07-15 PROCEDURE — 77030032490 HC SLV COMPR SCD KNE COVD -B

## 2017-07-15 PROCEDURE — 81001 URINALYSIS AUTO W/SCOPE: CPT | Performed by: EMERGENCY MEDICINE

## 2017-07-15 PROCEDURE — 70450 CT HEAD/BRAIN W/O DYE: CPT

## 2017-07-15 PROCEDURE — 80053 COMPREHEN METABOLIC PANEL: CPT | Performed by: EMERGENCY MEDICINE

## 2017-07-15 PROCEDURE — 83605 ASSAY OF LACTIC ACID: CPT

## 2017-07-15 PROCEDURE — C9113 INJ PANTOPRAZOLE SODIUM, VIA: HCPCS | Performed by: EMERGENCY MEDICINE

## 2017-07-15 PROCEDURE — 30233N1 TRANSFUSION OF NONAUTOLOGOUS RED BLOOD CELLS INTO PERIPHERAL VEIN, PERCUTANEOUS APPROACH: ICD-10-PCS | Performed by: HOSPITALIST

## 2017-07-15 PROCEDURE — 96365 THER/PROPH/DIAG IV INF INIT: CPT

## 2017-07-15 PROCEDURE — 86920 COMPATIBILITY TEST SPIN: CPT | Performed by: EMERGENCY MEDICINE

## 2017-07-15 PROCEDURE — 74011250636 HC RX REV CODE- 250/636: Performed by: EMERGENCY MEDICINE

## 2017-07-15 PROCEDURE — 74011000258 HC RX REV CODE- 258

## 2017-07-15 PROCEDURE — 93005 ELECTROCARDIOGRAM TRACING: CPT

## 2017-07-15 PROCEDURE — 87040 BLOOD CULTURE FOR BACTERIA: CPT | Performed by: EMERGENCY MEDICINE

## 2017-07-15 PROCEDURE — 74011250637 HC RX REV CODE- 250/637: Performed by: EMERGENCY MEDICINE

## 2017-07-15 PROCEDURE — 36430 TRANSFUSION BLD/BLD COMPNT: CPT

## 2017-07-15 PROCEDURE — 86900 BLOOD TYPING SEROLOGIC ABO: CPT | Performed by: EMERGENCY MEDICINE

## 2017-07-15 PROCEDURE — 87086 URINE CULTURE/COLONY COUNT: CPT | Performed by: EMERGENCY MEDICINE

## 2017-07-15 PROCEDURE — 85610 PROTHROMBIN TIME: CPT | Performed by: EMERGENCY MEDICINE

## 2017-07-15 PROCEDURE — 85730 THROMBOPLASTIN TIME PARTIAL: CPT | Performed by: EMERGENCY MEDICINE

## 2017-07-15 PROCEDURE — 96361 HYDRATE IV INFUSION ADD-ON: CPT

## 2017-07-15 PROCEDURE — 99285 EMERGENCY DEPT VISIT HI MDM: CPT

## 2017-07-15 PROCEDURE — 87186 SC STD MICRODIL/AGAR DIL: CPT | Performed by: EMERGENCY MEDICINE

## 2017-07-15 RX ORDER — METOLAZONE 2.5 MG/1
2.5 TABLET ORAL 2 TIMES DAILY
COMMUNITY
End: 2017-07-28

## 2017-07-15 RX ORDER — BUDESONIDE 0.5 MG/2ML
500 INHALANT ORAL 2 TIMES DAILY
COMMUNITY

## 2017-07-15 RX ORDER — SPIRONOLACTONE 100 MG/1
200 TABLET, FILM COATED ORAL DAILY
COMMUNITY
End: 2017-07-28

## 2017-07-15 RX ORDER — SODIUM CHLORIDE 9 MG/ML
250 INJECTION, SOLUTION INTRAVENOUS AS NEEDED
Status: DISCONTINUED | OUTPATIENT
Start: 2017-07-15 | End: 2017-07-28 | Stop reason: HOSPADM

## 2017-07-15 RX ORDER — CEFTRIAXONE 1 G/1
1 INJECTION, POWDER, FOR SOLUTION INTRAMUSCULAR; INTRAVENOUS
Status: COMPLETED | OUTPATIENT
Start: 2017-07-15 | End: 2017-07-15

## 2017-07-15 RX ORDER — NITROGLYCERIN 80 MG/1
1 PATCH TRANSDERMAL DAILY
Status: DISCONTINUED | OUTPATIENT
Start: 2017-07-16 | End: 2017-07-28 | Stop reason: HOSPADM

## 2017-07-15 RX ORDER — INSULIN ASPART 100 [IU]/ML
0-10 INJECTION, SOLUTION INTRAVENOUS; SUBCUTANEOUS
COMMUNITY
End: 2017-07-28

## 2017-07-15 RX ORDER — POTASSIUM CHLORIDE 20 MEQ/1
40 TABLET, EXTENDED RELEASE ORAL 2 TIMES DAILY
Status: DISCONTINUED | OUTPATIENT
Start: 2017-07-16 | End: 2017-07-16

## 2017-07-15 RX ORDER — PANTOPRAZOLE SODIUM 40 MG/10ML
80 INJECTION, POWDER, LYOPHILIZED, FOR SOLUTION INTRAVENOUS
Status: COMPLETED | OUTPATIENT
Start: 2017-07-15 | End: 2017-07-15

## 2017-07-15 RX ORDER — LATANOPROST 50 UG/ML
1 SOLUTION/ DROPS OPHTHALMIC
Status: DISCONTINUED | OUTPATIENT
Start: 2017-07-16 | End: 2017-07-28 | Stop reason: HOSPADM

## 2017-07-15 RX ORDER — PETROLATUM 42 G/100G
OINTMENT TOPICAL AS NEEDED
COMMUNITY

## 2017-07-15 RX ORDER — ATORVASTATIN CALCIUM 20 MG/1
10 TABLET, FILM COATED ORAL DAILY
Status: DISCONTINUED | OUTPATIENT
Start: 2017-07-16 | End: 2017-07-17

## 2017-07-15 RX ORDER — BUMETANIDE 1 MG/1
2 TABLET ORAL 2 TIMES DAILY
COMMUNITY

## 2017-07-15 RX ORDER — BRIMONIDINE TARTRATE 2 MG/ML
1 SOLUTION/ DROPS OPHTHALMIC 2 TIMES DAILY
Status: DISCONTINUED | OUTPATIENT
Start: 2017-07-16 | End: 2017-07-28 | Stop reason: HOSPADM

## 2017-07-15 RX ORDER — ACETAMINOPHEN 650 MG/1
650 SUPPOSITORY RECTAL
Status: COMPLETED | OUTPATIENT
Start: 2017-07-15 | End: 2017-07-15

## 2017-07-15 RX ORDER — DORZOLAMIDE HYDROCHLORIDE AND TIMOLOL MALEATE 20; 5 MG/ML; MG/ML
1 SOLUTION/ DROPS OPHTHALMIC 2 TIMES DAILY
Status: DISCONTINUED | OUTPATIENT
Start: 2017-07-16 | End: 2017-07-28 | Stop reason: HOSPADM

## 2017-07-15 RX ORDER — SODIUM CHLORIDE 9 MG/ML
75 INJECTION, SOLUTION INTRAVENOUS AS NEEDED
Status: DISCONTINUED | OUTPATIENT
Start: 2017-07-15 | End: 2017-07-28 | Stop reason: HOSPADM

## 2017-07-15 RX ORDER — SODIUM CHLORIDE 900 MG/100ML
INJECTION INTRAVENOUS
Status: COMPLETED
Start: 2017-07-15 | End: 2017-07-15

## 2017-07-15 RX ORDER — SENNOSIDES 8.6 MG/1
1 TABLET ORAL 2 TIMES DAILY
Status: DISCONTINUED | OUTPATIENT
Start: 2017-07-16 | End: 2017-07-28 | Stop reason: HOSPADM

## 2017-07-15 RX ORDER — ALLOPURINOL 100 MG/1
200 TABLET ORAL EVERY OTHER DAY
Status: DISCONTINUED | OUTPATIENT
Start: 2017-07-16 | End: 2017-07-17

## 2017-07-15 RX ORDER — ONDANSETRON 2 MG/ML
4 INJECTION INTRAMUSCULAR; INTRAVENOUS
Status: DISCONTINUED | OUTPATIENT
Start: 2017-07-15 | End: 2017-07-28 | Stop reason: HOSPADM

## 2017-07-15 RX ORDER — SODIUM CHLORIDE 0.9 % (FLUSH) 0.9 %
5-10 SYRINGE (ML) INJECTION EVERY 8 HOURS
Status: DISCONTINUED | OUTPATIENT
Start: 2017-07-16 | End: 2017-07-28 | Stop reason: HOSPADM

## 2017-07-15 RX ORDER — SODIUM CHLORIDE 0.9 % (FLUSH) 0.9 %
5-10 SYRINGE (ML) INJECTION AS NEEDED
Status: DISCONTINUED | OUTPATIENT
Start: 2017-07-15 | End: 2017-07-28 | Stop reason: HOSPADM

## 2017-07-15 RX ORDER — AMLODIPINE BESYLATE 5 MG/1
5 TABLET ORAL DAILY
Status: DISCONTINUED | OUTPATIENT
Start: 2017-07-16 | End: 2017-07-17

## 2017-07-15 RX ADMIN — ACETAMINOPHEN 650 MG: 650 SUPPOSITORY RECTAL at 16:13

## 2017-07-15 RX ADMIN — PHYTONADIONE 10 MG: 10 INJECTION, EMULSION INTRAMUSCULAR; INTRAVENOUS; SUBCUTANEOUS at 18:00

## 2017-07-15 RX ADMIN — SODIUM CHLORIDE 100 ML: 900 INJECTION INTRAVENOUS at 16:58

## 2017-07-15 RX ADMIN — CEFTRIAXONE 1 G: 1 INJECTION, POWDER, FOR SOLUTION INTRAMUSCULAR; INTRAVENOUS at 16:58

## 2017-07-15 RX ADMIN — PANTOPRAZOLE SODIUM 80 MG: 40 INJECTION, POWDER, FOR SOLUTION INTRAVENOUS at 17:04

## 2017-07-15 RX ADMIN — SODIUM CHLORIDE 2682 ML: 900 INJECTION, SOLUTION INTRAVENOUS at 15:35

## 2017-07-15 RX ADMIN — Medication 10 ML: at 23:36

## 2017-07-15 NOTE — ED PROVIDER NOTES
HPI Comments: 3:13 PM Nurys Phelan is a 67 y.o. male with a h/o HTN, DM, Gout, and CHF who presents to the ED via EMS from a NH for evaluation of AMS that began today. Per EMS, nursing home staff reported that the patient was not at his baseline, unresponsive, cold/clammy, diaphoretic, and pt's cardiac monitor was picking up some abnormalities. 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 Additional h/o difficult to obtain due to limited story from nursing home and pt's AMS. PCP:  Khari Cabral MD      The history is provided by the patient. Past Medical History:   Diagnosis Date    [facility]     DM [facility]diabetes mellitus) [facility] [address]     Gout     HTN (hypertension)        No past surgical history on file. No family history on file. Social History     Social History    Marital status: SINGLE     Spouse name: N/A    Number of children: N/A    Years of education: N/A     Occupational History    Not on file. Social History Main Topics    Smoking status: Never Smoker    Smokeless tobacco: Not on file    Alcohol use No    Drug use: No    Sexual activity: Not on file     Other Topics Concern    Not on file     Social History Narrative         ALLERGIES: Review of patient's allergies indicates no known allergies. Review of Systems   Unable to perform ROS: Patient nonverbal       Vitals:    07/15/17 1500 07/15/17 1529 07/15/17 1530 07/15/17 1700   BP: 120/64 129/70 128/68 118/61   Pulse: (!) 110 (!) 112 (!) 111 (!) 107   Resp: 29 25 30 30   Temp:  100.2 °F (37.9 °C)     SpO2: 96% 98% 99% 97%   Weight:   89.4 kg (197 lb)    Height:   6' (1.829 m)             Physical Exam   Constitutional: He appears well-developed and well-nourished. No distress. HENT:   Head: Normocephalic and atraumatic. Mouth/Throat: Oropharynx is clear and moist. Mucous membranes are dry. Eyes: Conjunctivae and EOM are normal. Pupils are equal, round, and reactive to light.  No scleral icterus. Neck: Normal range of motion. Neck supple. Cardiovascular: Regular rhythm and normal heart sounds. Tachycardia present. No murmur heard. Pulmonary/Chest: Effort normal and breath sounds normal. Tachypnea noted. No respiratory distress. Abdominal: Soft. Bowel sounds are normal. He exhibits no distension. There is no tenderness. Genitourinary: Rectal exam shows guaiac positive stool (trace). Musculoskeletal: He exhibits no edema. Lymphadenopathy:     He has no cervical adenopathy. Neurological: He is alert. Pt is following me w/ his eyes   Skin: Skin is warm. No rash noted. He is diaphoretic. Nursing note and vitals reviewed.        MDM  Number of Diagnoses or Management Options  Acute UTI:   Febrile illness, acute:   Gastrointestinal hemorrhage, unspecified gastrointestinal hemorrhage type:   Low hemoglobin:   Diagnosis management comments: Code sepsis called on arrival for possible sepsis with low grade fever, tachypnea mild tachycardia fluid bolus started labs reviewed noted low h/h h/o UGI bleed in past hemorrhagic vs septic shock vs both    Ekg: sinus tach 106 L BBB    Noted low h/h trace guaiac + stool protonix 80mg IV started PRBC X 2 units ordered          Amount and/or Complexity of Data Reviewed  Clinical lab tests: ordered and reviewed  Tests in the radiology section of CPT®: ordered and reviewed  Independent visualization of images, tracings, or specimens: yes    Risk of Complications, Morbidity, and/or Mortality  Presenting problems: high  Diagnostic procedures: moderate  Management options: moderate    Critical Care  Total time providing critical care: 30-74 minutes (45 min critical care time involved resuscitation including fluids and blood products sepsis evaluation and GI bleed evaluation and care )    Patient Progress  Patient progress: improved    ED Course       Procedures    Vitals:  Patient Vitals for the past 12 hrs:   Temp Pulse Resp BP SpO2   07/15/17 1700 - (!) 107 30 118/61 97 %   07/15/17 1530 - (!) 111 30 128/68 99 %   07/15/17 1529 100.2 °F (37.9 °C) (!) 112 25 129/70 98 %   07/15/17 1500 - (!) 110 29 120/64 96 %   99 % Pulse ox reviewed and WNL.          Medications ordered:   Medications   sodium chloride (NS) flush 5-10 mL (not administered)   0.9% sodium chloride infusion 250 mL (not administered)   phytonadione (vitamin K1) (AQUA-MEPHYTON) 10 mg in 0.9% sodium chloride 50 mL IVPB (not administered)   sodium chloride 0.9 % bolus infusion 2,682 mL (2,682 mL IntraVENous New Bag 7/15/17 1535)   acetaminophen (TYLENOL) suppository 650 mg (650 mg Rectal Given 7/15/17 1613)   pantoprazole (PROTONIX) injection 80 mg (80 mg IntraVENous Given 7/15/17 1704)   cefTRIAXone (ROCEPHIN) injection 1 g (1 g IntraVENous Given 7/15/17 1658)   0.9% sodium chloride (MBP/ADV) 0.9 % infusion (100 mL  Given 7/15/17 1658)         Lab findings:  Recent Results (from the past 12 hour(s))   EKG, 12 LEAD, INITIAL    Collection Time: 07/15/17  3:05 PM   Result Value Ref Range    Ventricular Rate 106 BPM    Atrial Rate 106 BPM    P-R Interval 184 ms    QRS Duration 146 ms    Q-T Interval 390 ms    QTC Calculation (Bezet) 518 ms    Calculated P Axis -26 degrees    Calculated R Axis -65 degrees    Calculated T Axis 94 degrees    Diagnosis       Sinus tachycardia  Left axis deviation  Left bundle branch block  Abnormal ECG  When compared with ECG of 15-FEB-2017 14:57,  Left bundle branch block is now present     GLUCOSE, POC    Collection Time: 07/15/17  3:06 PM   Result Value Ref Range    Glucose (POC) 197 (H) 70 - 110 mg/dL   POC LACTIC ACID    Collection Time: 07/15/17  3:13 PM   Result Value Ref Range    Lactic Acid (POC) 0.8 0.4 - 2.0 mmol/L   CULTURE, BLOOD    Collection Time: 07/15/17  3:18 PM   Result Value Ref Range    Special Requests: PERIPHERAL      Culture result: PENDING    METABOLIC PANEL, COMPREHENSIVE    Collection Time: 07/15/17  3:18 PM   Result Value Ref Range Sodium 152 (H) 136 - 145 mmol/L    Potassium 3.6 3.5 - 5.5 mmol/L    Chloride 130 (H) 100 - 108 mmol/L    CO2 12 (L) 21 - 32 mmol/L    Anion gap 10 3.0 - 18 mmol/L    Glucose 100 (H) 74 - 99 mg/dL    BUN 52 (H) 7.0 - 18 MG/DL    Creatinine 2.41 (H) 0.6 - 1.3 MG/DL    BUN/Creatinine ratio 22 (H) 12 - 20      GFR est AA 32 (L) >60 ml/min/1.73m2    GFR est non-AA 27 (L) >60 ml/min/1.73m2    Calcium <5.0 (LL) 8.5 - 10.1 MG/DL    Bilirubin, total 0.4 0.2 - 1.0 MG/DL    ALT (SGPT) 12 (L) 16 - 61 U/L    AST (SGOT) 17 15 - 37 U/L    Alk. phosphatase 77 45 - 117 U/L    Protein, total 3.9 (L) 6.4 - 8.2 g/dL    Albumin 1.1 (L) 3.4 - 5.0 g/dL    Globulin 2.8 2.0 - 4.0 g/dL    A-G Ratio 0.4 (L) 0.8 - 1.7     CBC WITH AUTOMATED DIFF    Collection Time: 07/15/17  3:18 PM   Result Value Ref Range    WBC 8.7 4.6 - 13.2 K/uL    RBC 2.61 (L) 4.70 - 5.50 M/uL    HGB 6.3 (L) 13.0 - 16.0 g/dL    HCT 21.4 (L) 36.0 - 48.0 %    MCV 82.0 74.0 - 97.0 FL    MCH 24.1 24.0 - 34.0 PG    MCHC 29.4 (L) 31.0 - 37.0 g/dL    RDW 18.0 (H) 11.6 - 14.5 %    PLATELET 823 621 - 720 K/uL    MPV 9.2 9.2 - 11.8 FL    NEUTROPHILS 90 (H) 42 - 75 %    BAND NEUTROPHILS 1 0 - 5 %    LYMPHOCYTES 4 (L) 20 - 51 %    MONOCYTES 5 2 - 9 %    EOSINOPHILS 0 0 - 5 %    BASOPHILS 0 0 - 3 %    ABS. NEUTROPHILS 7.8 1.8 - 8.0 K/UL    ABS. LYMPHOCYTES 0.3 (L) 0.8 - 3.5 K/UL    ABS. MONOCYTES 0.4 0 - 1.0 K/UL    ABS. EOSINOPHILS 0.0 0.0 - 0.4 K/UL    ABS.  BASOPHILS 0.0 0.0 - 0.1 K/UL    PLATELET COMMENTS DECREASED PLATELETS      RBC COMMENTS ANISOCYTOSIS  2+        RBC COMMENTS HYPOCHROMIA  1+        DF MANUAL     PROTHROMBIN TIME + INR    Collection Time: 07/15/17  3:18 PM   Result Value Ref Range    Prothrombin time 23.9 (H) 11.5 - 15.2 sec    INR 2.3 (H) 0.8 - 1.2     PTT    Collection Time: 07/15/17  3:18 PM   Result Value Ref Range    aPTT 40.8 (H) 23.0 - 36.4 SEC   CALCIUM, IONIZED    Collection Time: 07/15/17  3:18 PM   Result Value Ref Range    Ionized Calcium 0.79 (L) 1.12 - 1.32 MMOL/L   CULTURE, BLOOD    Collection Time: 07/15/17  3:22 PM   Result Value Ref Range    Special Requests: PERIPHERAL      Culture result: PENDING    URINALYSIS W/ RFLX MICROSCOPIC    Collection Time: 07/15/17  3:34 PM   Result Value Ref Range    Color DARK YELLOW      Appearance CLOUDY      Specific gravity 1.018 1.005 - 1.030      pH (UA) 5.0 5.0 - 8.0      Protein 30 (A) NEG mg/dL    Glucose NEGATIVE  NEG mg/dL    Ketone NEGATIVE  NEG mg/dL    Bilirubin NEGATIVE  NEG      Blood MODERATE (A) NEG      Urobilinogen 1.0 0.2 - 1.0 EU/dL    Nitrites NEGATIVE  NEG      Leukocyte Esterase SMALL (A) NEG     URINE MICROSCOPIC ONLY    Collection Time: 07/15/17  3:34 PM   Result Value Ref Range    WBC 4 to 6 0 - 4 /hpf    RBC 10 to 15 0 - 5 /hpf    Epithelial cells 2+ 0 - 5 /lpf    Bacteria 1+ (A) NEG /hpf    Amorphous Crystals 2+ (A) NEG       EKG interpretation by ED Physician:      X-Ray, CT or other radiology findings or impressions:  CT HEAD WO CONT   Final Result   IMPRESSION:     1. No CT findings of an acute intracranial abnormality. Please note that  noncontrast head CT may be normal in early acute infarct.      2. Mild cortical and central volume loss. Mild amount of chronic white matter  changes. XR CHEST PORT   Final Result   IMPRESSION:     1. Cardiomegaly. Otherwise, No acute cardiopulmonary process.          Progress notes, Consult notes or additional Procedure notes:   4:23 PM Patient appears more alert and responsive, he is responding to sign language and written notes. He appears to have pain in his R shoulder. 5:30 PM I discussed care Dr. Riaz Munoz, hospitalist. He agrees to admit the pt.     5:38 PM I discussed care w/ Dr. Bhavik Michaels, GI. He agrees to consult. Reevaluation of patient:       Disposition:  Diagnosis:   1. Gastrointestinal hemorrhage, unspecified gastrointestinal hemorrhage type    2. Febrile illness, acute    3. Acute UTI    4.  Low hemoglobin        Disposition: Admit    Follow-up Information     None           Patient's Medications   Start Taking    No medications on file   Continue Taking    ALLOPURINOL (ZYLOPRIM) 100 MG TABLET    Take 200 mg by mouth every other day. AMLODIPINE (NORVASC) 5 MG TABLET    Take 5 mg by mouth daily. ATORVASTATIN (LIPITOR) 10 MG TABLET    Take 10 mg by mouth daily. BRIMONIDINE (ALPHAGAN) 0.2 % OPHTHALMIC SOLUTION    Administer 1 Drop to both eyes two (2) times a day. CHOLECALCIFEROL (VITAMIN D3) 1,000 UNIT CAP    Take 1,000 Units by mouth daily. DORZOLAMIDE-TIMOLOL (COSOPT) 22.3-6.8 MG/ML OPHTHALMIC SOLUTION    Administer 1 Drop to both eyes two (2) times a day. INSULIN GLARGINE (LANTUS SOLOSTAR) 100 UNIT/ML (3 ML) PEN    20 Units by SubCUTAneous route two (2) times a day. INSULIN LISPRO (HUMALOG) 100 UNIT/ML INJECTION    For Blood Sugar (mg/dL) of:     Less than 150 =   0 units           150 -199 =   2 units  200 -249 =   4 units  250 -299 =   6 units  300 -349 =   8 units  350 and above = 10 units and Call Physician    LATANOPROST (XALATAN) 0.005 % OPHTHALMIC SOLUTION    Administer 1 Drop to both eyes nightly. LEVOFLOXACIN (LEVAQUIN) 500 MG TABLET    Take 1 Tab by mouth daily. NITROGLYCERIN (NITRODUR) 0.4 MG/HR    1 Patch by TransDERmal route daily. POTASSIUM CHLORIDE (K-DUR, KLOR-CON) 20 MEQ TABLET    Take 40 mEq by mouth two (2) times a day. SENNA (SENOKOT) 8.6 MG TABLET    Take 1 Tab by mouth two (2) times a day. SPIRONOLACTONE (ALDACTONE) 50 MG TABLET    Take 50 mg by mouth daily.    These Medications have changed    No medications on file   Stop Taking    No medications on file         SCRIBE ATTESTATION STATEMENT  Documented by: Nelida Mendoza for, and in the presence of, Hermila Rider MD 07/15/17 5:43 PM     Signed by: Edie Reese, 07/15/17 3:20 PM     PROVIDER ATTESTATION STATEMENT  I personally performed the services described in the documentation, reviewed the documentation, as recorded by the scribe in my presence, and it accurately and completely records my words and actions.   Rito Sweeney MD         10:00 AM 9/28/2017 chart amended to add critical care time

## 2017-07-15 NOTE — ED TRIAGE NOTES
Per EMS \"He was picked up from a nursing home, they called for altered mental status because he isn't at his baseline. He is clammy and his skin feels cool and the Cardiac monitor is picking up some abnormalities\".

## 2017-07-16 ENCOUNTER — APPOINTMENT (OUTPATIENT)
Dept: GENERAL RADIOLOGY | Age: 72
DRG: 377 | End: 2017-07-16
Attending: HOSPITALIST
Payer: MEDICARE

## 2017-07-16 LAB
ABO + RH BLD: NORMAL
ALBUMIN SERPL BCP-MCNC: 2.4 G/DL (ref 3.4–5)
ALBUMIN/GLOB SERPL: 0.4 {RATIO} (ref 0.8–1.7)
ALP SERPL-CCNC: 160 U/L (ref 45–117)
ALT SERPL-CCNC: 20 U/L (ref 16–61)
ANION GAP BLD CALC-SCNC: 11 MMOL/L (ref 3–18)
ANION GAP BLD CALC-SCNC: 14 MMOL/L (ref 3–18)
ANION GAP BLD CALC-SCNC: 14 MMOL/L (ref 3–18)
ARTERIAL PATENCY WRIST A: YES
AST SERPL W P-5'-P-CCNC: 28 U/L (ref 15–37)
BASE DEFICIT BLD-SCNC: 4 MMOL/L
BASOPHILS # BLD AUTO: 0 K/UL (ref 0–0.06)
BASOPHILS # BLD AUTO: 0 K/UL (ref 0–0.06)
BASOPHILS # BLD: 0 % (ref 0–2)
BASOPHILS # BLD: 0 % (ref 0–2)
BDY SITE: ABNORMAL
BILIRUB DIRECT SERPL-MCNC: 0.5 MG/DL (ref 0–0.2)
BILIRUB SERPL-MCNC: 0.8 MG/DL (ref 0.2–1)
BLD PROD TYP BPU: NORMAL
BLD PROD TYP BPU: NORMAL
BLOOD GROUP ANTIBODIES SERPL: NORMAL
BODY TEMPERATURE: 101
BPU ID: NORMAL
BPU ID: NORMAL
BUN SERPL-MCNC: 83 MG/DL (ref 7–18)
BUN SERPL-MCNC: 85 MG/DL (ref 7–18)
BUN SERPL-MCNC: 91 MG/DL (ref 7–18)
BUN/CREAT SERPL: 18 (ref 12–20)
BUN/CREAT SERPL: 18 (ref 12–20)
BUN/CREAT SERPL: 19 (ref 12–20)
CA-I SERPL-SCNC: 1.18 MMOL/L (ref 1.12–1.32)
CALCIUM SERPL-MCNC: 9.5 MG/DL (ref 8.5–10.1)
CALCIUM SERPL-MCNC: 9.7 MG/DL (ref 8.5–10.1)
CALCIUM SERPL-MCNC: 9.8 MG/DL (ref 8.5–10.1)
CALLED TO:,BCALL1: NORMAL
CHLORIDE SERPL-SCNC: 110 MMOL/L (ref 100–108)
CHLORIDE SERPL-SCNC: 110 MMOL/L (ref 100–108)
CHLORIDE SERPL-SCNC: 112 MMOL/L (ref 100–108)
CK MB CFR SERPL CALC: 2.4 % (ref 0–4)
CK MB SERPL-MCNC: 1.2 NG/ML (ref 5–25)
CK SERPL-CCNC: 49 U/L (ref 39–308)
CO2 SERPL-SCNC: 18 MMOL/L (ref 21–32)
CO2 SERPL-SCNC: 20 MMOL/L (ref 21–32)
CO2 SERPL-SCNC: 23 MMOL/L (ref 21–32)
CREAT SERPL-MCNC: 4.55 MG/DL (ref 0.6–1.3)
CREAT SERPL-MCNC: 4.68 MG/DL (ref 0.6–1.3)
CREAT SERPL-MCNC: 4.75 MG/DL (ref 0.6–1.3)
CROSSMATCH RESULT,%XM: NORMAL
CROSSMATCH RESULT,%XM: NORMAL
DIFFERENTIAL METHOD BLD: ABNORMAL
DIFFERENTIAL METHOD BLD: ABNORMAL
EOSINOPHIL # BLD: 0.1 K/UL (ref 0–0.4)
EOSINOPHIL # BLD: 0.1 K/UL (ref 0–0.4)
EOSINOPHIL NFR BLD: 1 % (ref 0–5)
EOSINOPHIL NFR BLD: 1 % (ref 0–5)
ERYTHROCYTE [DISTWIDTH] IN BLOOD BY AUTOMATED COUNT: 17.5 % (ref 11.6–14.5)
ERYTHROCYTE [DISTWIDTH] IN BLOOD BY AUTOMATED COUNT: 17.6 % (ref 11.6–14.5)
ERYTHROCYTE [DISTWIDTH] IN BLOOD BY AUTOMATED COUNT: 17.8 % (ref 11.6–14.5)
GAS FLOW.O2 O2 DELIVERY SYS: ABNORMAL L/MIN
GAS FLOW.O2 SETTING OXYMISER: 5 L/M
GLOBULIN SER CALC-MCNC: 5.9 G/DL (ref 2–4)
GLUCOSE BLD STRIP.AUTO-MCNC: 113 MG/DL (ref 70–110)
GLUCOSE SERPL-MCNC: 140 MG/DL (ref 74–99)
GLUCOSE SERPL-MCNC: 97 MG/DL (ref 74–99)
GLUCOSE SERPL-MCNC: 99 MG/DL (ref 74–99)
HCO3 BLD-SCNC: 20.3 MMOL/L (ref 22–26)
HCT VFR BLD AUTO: 44.9 % (ref 36–48)
HCT VFR BLD AUTO: 45 % (ref 36–48)
HCT VFR BLD AUTO: 45.4 % (ref 36–48)
HCT VFR BLD AUTO: 45.9 % (ref 36–48)
HCT VFR BLD AUTO: 46.9 % (ref 36–48)
HGB BLD-MCNC: 13.9 G/DL (ref 13–16)
HGB BLD-MCNC: 14.1 G/DL (ref 13–16)
HGB BLD-MCNC: 14.4 G/DL (ref 13–16)
HGB BLD-MCNC: 14.4 G/DL (ref 13–16)
HGB BLD-MCNC: 14.6 G/DL (ref 13–16)
INR PPP: 1.3 (ref 0.8–1.2)
INR PPP: 1.4 (ref 0.8–1.2)
LACTATE SERPL-SCNC: 1.7 MMOL/L (ref 0.4–2)
LYMPHOCYTES # BLD AUTO: 5 % (ref 21–52)
LYMPHOCYTES # BLD AUTO: 8 % (ref 21–52)
LYMPHOCYTES # BLD: 0.7 K/UL (ref 0.9–3.6)
LYMPHOCYTES # BLD: 1.2 K/UL (ref 0.9–3.6)
MAGNESIUM SERPL-MCNC: 2.6 MG/DL (ref 1.6–2.6)
MCH RBC QN AUTO: 25.4 PG (ref 24–34)
MCH RBC QN AUTO: 25.7 PG (ref 24–34)
MCH RBC QN AUTO: 26 PG (ref 24–34)
MCHC RBC AUTO-ENTMCNC: 31 G/DL (ref 31–37)
MCHC RBC AUTO-ENTMCNC: 31.3 G/DL (ref 31–37)
MCHC RBC AUTO-ENTMCNC: 31.7 G/DL (ref 31–37)
MCV RBC AUTO: 81.9 FL (ref 74–97)
MCV RBC AUTO: 82.1 FL (ref 74–97)
MCV RBC AUTO: 82.1 FL (ref 74–97)
MONOCYTES # BLD: 0.5 K/UL (ref 0.05–1.2)
MONOCYTES # BLD: 1.2 K/UL (ref 0.05–1.2)
MONOCYTES NFR BLD AUTO: 4 % (ref 3–10)
MONOCYTES NFR BLD AUTO: 8 % (ref 3–10)
NEUTS SEG # BLD: 12.5 K/UL (ref 1.8–8)
NEUTS SEG # BLD: 12.6 K/UL (ref 1.8–8)
NEUTS SEG NFR BLD AUTO: 86 % (ref 40–73)
NEUTS SEG NFR BLD AUTO: 87 % (ref 40–73)
O2/TOTAL GAS SETTING VFR VENT: 40 %
PCO2 BLD: 31.7 MMHG (ref 35–45)
PH BLD: 7.42 [PH] (ref 7.35–7.45)
PHOSPHATE SERPL-MCNC: 4.2 MG/DL (ref 2.5–4.9)
PLATELET # BLD AUTO: 280 K/UL (ref 135–420)
PLATELET # BLD AUTO: 289 K/UL (ref 135–420)
PLATELET # BLD AUTO: 309 K/UL (ref 135–420)
PMV BLD AUTO: 10 FL (ref 9.2–11.8)
PMV BLD AUTO: 10.3 FL (ref 9.2–11.8)
PMV BLD AUTO: 9.9 FL (ref 9.2–11.8)
PO2 BLD: 71 MMHG (ref 80–100)
POTASSIUM SERPL-SCNC: 5.6 MMOL/L (ref 3.5–5.5)
POTASSIUM SERPL-SCNC: 6 MMOL/L (ref 3.5–5.5)
POTASSIUM SERPL-SCNC: 6.2 MMOL/L (ref 3.5–5.5)
PROT SERPL-MCNC: 8.3 G/DL (ref 6.4–8.2)
PROTHROMBIN TIME: 16 SEC (ref 11.5–15.2)
PROTHROMBIN TIME: 16.3 SEC (ref 11.5–15.2)
RBC # BLD AUTO: 5.47 M/UL (ref 4.7–5.5)
RBC # BLD AUTO: 5.48 M/UL (ref 4.7–5.5)
RBC # BLD AUTO: 5.54 M/UL (ref 4.7–5.5)
SAO2 % BLD: 94 % (ref 92–97)
SERVICE CMNT-IMP: ABNORMAL
SODIUM SERPL-SCNC: 144 MMOL/L (ref 136–145)
SPECIMEN EXP DATE BLD: NORMAL
SPECIMEN TYPE: ABNORMAL
STATUS OF UNIT,%ST: NORMAL
STATUS OF UNIT,%ST: NORMAL
TOTAL RESP. RATE, ITRR: 28
TROPONIN I SERPL-MCNC: 0.24 NG/ML (ref 0–0.04)
UNIT DIVISION, %UDIV: 0
UNIT DIVISION, %UDIV: 0
WBC # BLD AUTO: 14.4 K/UL (ref 4.6–13.2)
WBC # BLD AUTO: 14.4 K/UL (ref 4.6–13.2)
WBC # BLD AUTO: 14.5 K/UL (ref 4.6–13.2)

## 2017-07-16 PROCEDURE — 85027 COMPLETE CBC AUTOMATED: CPT | Performed by: HOSPITALIST

## 2017-07-16 PROCEDURE — 74011250637 HC RX REV CODE- 250/637: Performed by: HOSPITALIST

## 2017-07-16 PROCEDURE — 36415 COLL VENOUS BLD VENIPUNCTURE: CPT | Performed by: HOSPITALIST

## 2017-07-16 PROCEDURE — 80048 BASIC METABOLIC PNL TOTAL CA: CPT | Performed by: HOSPITALIST

## 2017-07-16 PROCEDURE — 74011000250 HC RX REV CODE- 250

## 2017-07-16 PROCEDURE — 74011000258 HC RX REV CODE- 258: Performed by: HOSPITALIST

## 2017-07-16 PROCEDURE — 74011250637 HC RX REV CODE- 250/637

## 2017-07-16 PROCEDURE — 80076 HEPATIC FUNCTION PANEL: CPT | Performed by: HOSPITALIST

## 2017-07-16 PROCEDURE — 82803 BLOOD GASES ANY COMBINATION: CPT

## 2017-07-16 PROCEDURE — 36600 WITHDRAWAL OF ARTERIAL BLOOD: CPT

## 2017-07-16 PROCEDURE — 65610000006 HC RM INTENSIVE CARE

## 2017-07-16 PROCEDURE — 74011250636 HC RX REV CODE- 250/636: Performed by: HOSPITALIST

## 2017-07-16 PROCEDURE — 74011636637 HC RX REV CODE- 636/637

## 2017-07-16 PROCEDURE — 74011250636 HC RX REV CODE- 250/636

## 2017-07-16 PROCEDURE — 84100 ASSAY OF PHOSPHORUS: CPT | Performed by: HOSPITALIST

## 2017-07-16 PROCEDURE — 74000 XR ABD PORT  1 V: CPT

## 2017-07-16 PROCEDURE — 83735 ASSAY OF MAGNESIUM: CPT | Performed by: HOSPITALIST

## 2017-07-16 PROCEDURE — 87086 URINE CULTURE/COLONY COUNT: CPT | Performed by: HOSPITALIST

## 2017-07-16 PROCEDURE — 82330 ASSAY OF CALCIUM: CPT | Performed by: HOSPITALIST

## 2017-07-16 PROCEDURE — 77030034850

## 2017-07-16 PROCEDURE — 83605 ASSAY OF LACTIC ACID: CPT | Performed by: HOSPITALIST

## 2017-07-16 PROCEDURE — 93005 ELECTROCARDIOGRAM TRACING: CPT

## 2017-07-16 PROCEDURE — 85025 COMPLETE CBC W/AUTO DIFF WBC: CPT | Performed by: HOSPITALIST

## 2017-07-16 PROCEDURE — 85018 HEMOGLOBIN: CPT | Performed by: HOSPITALIST

## 2017-07-16 PROCEDURE — 82550 ASSAY OF CK (CPK): CPT | Performed by: HOSPITALIST

## 2017-07-16 PROCEDURE — 85610 PROTHROMBIN TIME: CPT | Performed by: HOSPITALIST

## 2017-07-16 PROCEDURE — 82962 GLUCOSE BLOOD TEST: CPT

## 2017-07-16 PROCEDURE — 74011000250 HC RX REV CODE- 250: Performed by: HOSPITALIST

## 2017-07-16 PROCEDURE — C9113 INJ PANTOPRAZOLE SODIUM, VIA: HCPCS | Performed by: HOSPITALIST

## 2017-07-16 PROCEDURE — 81001 URINALYSIS AUTO W/SCOPE: CPT | Performed by: HOSPITALIST

## 2017-07-16 RX ORDER — SODIUM POLYSTYRENE SULFONATE 15 G/60ML
SUSPENSION ORAL; RECTAL
Status: COMPLETED
Start: 2017-07-16 | End: 2017-07-16

## 2017-07-16 RX ORDER — SODIUM POLYSTYRENE SULFONATE 15 G/60ML
30 SUSPENSION ORAL; RECTAL
Status: COMPLETED | OUTPATIENT
Start: 2017-07-16 | End: 2017-07-16

## 2017-07-16 RX ORDER — ALBUTEROL SULFATE 0.83 MG/ML
10 SOLUTION RESPIRATORY (INHALATION)
Status: ACTIVE | OUTPATIENT
Start: 2017-07-16 | End: 2017-07-17

## 2017-07-16 RX ORDER — DEXTROSE 50 % IN WATER (D50W) INTRAVENOUS SYRINGE
25
Status: COMPLETED | OUTPATIENT
Start: 2017-07-16 | End: 2017-07-16

## 2017-07-16 RX ORDER — CALCIUM GLUCONATE 94 MG/ML
1 INJECTION, SOLUTION INTRAVENOUS ONCE
Status: COMPLETED | OUTPATIENT
Start: 2017-07-16 | End: 2017-07-16

## 2017-07-16 RX ORDER — FUROSEMIDE 10 MG/ML
INJECTION INTRAMUSCULAR; INTRAVENOUS
Status: DISPENSED
Start: 2017-07-16 | End: 2017-07-17

## 2017-07-16 RX ORDER — DEXTROSE 50 % IN WATER (D50W) INTRAVENOUS SYRINGE
Status: COMPLETED
Start: 2017-07-16 | End: 2017-07-16

## 2017-07-16 RX ORDER — ALBUTEROL SULFATE 0.83 MG/ML
SOLUTION RESPIRATORY (INHALATION)
Status: COMPLETED
Start: 2017-07-16 | End: 2017-07-16

## 2017-07-16 RX ORDER — FUROSEMIDE 10 MG/ML
INJECTION INTRAMUSCULAR; INTRAVENOUS
Status: COMPLETED | OUTPATIENT
Start: 2017-07-16 | End: 2017-07-16

## 2017-07-16 RX ORDER — ACETAMINOPHEN 325 MG/1
TABLET ORAL
Status: COMPLETED
Start: 2017-07-16 | End: 2017-07-16

## 2017-07-16 RX ORDER — CALCIUM GLUCONATE 94 MG/ML
INJECTION, SOLUTION INTRAVENOUS
Status: COMPLETED
Start: 2017-07-16 | End: 2017-07-16

## 2017-07-16 RX ORDER — ACETAMINOPHEN 325 MG/1
650 TABLET ORAL
Status: DISCONTINUED | OUTPATIENT
Start: 2017-07-16 | End: 2017-07-18

## 2017-07-16 RX ADMIN — BRIMONIDINE TARTRATE 1 DROP: 2 SOLUTION OPHTHALMIC at 09:00

## 2017-07-16 RX ADMIN — BRIMONIDINE TARTRATE 1 DROP: 2 SOLUTION OPHTHALMIC at 18:00

## 2017-07-16 RX ADMIN — SODIUM POLYSTYRENE SULFONATE 30 G: 15 SUSPENSION ORAL; RECTAL at 21:55

## 2017-07-16 RX ADMIN — SENNOSIDES 8.6 MG: 8.6 TABLET, FILM COATED ORAL at 11:18

## 2017-07-16 RX ADMIN — DORZOLAMIDE HYDROCHLORIDE AND TIMOLOL MALEATE 1 DROP: 20; 5 SOLUTION/ DROPS OPHTHALMIC at 09:00

## 2017-07-16 RX ADMIN — DEXTROSE MONOHYDRATE 25 G: 25 INJECTION, SOLUTION INTRAVENOUS at 21:49

## 2017-07-16 RX ADMIN — SODIUM CHLORIDE 40 MG: 9 INJECTION INTRAMUSCULAR; INTRAVENOUS; SUBCUTANEOUS at 22:53

## 2017-07-16 RX ADMIN — ALBUTEROL SULFATE 10 MG: 2.5 SOLUTION RESPIRATORY (INHALATION) at 21:48

## 2017-07-16 RX ADMIN — SODIUM CHLORIDE 40 MG: 9 INJECTION INTRAMUSCULAR; INTRAVENOUS; SUBCUTANEOUS at 00:55

## 2017-07-16 RX ADMIN — AMLODIPINE BESYLATE 5 MG: 5 TABLET ORAL at 11:18

## 2017-07-16 RX ADMIN — Medication 10 ML: at 14:00

## 2017-07-16 RX ADMIN — LATANOPROST 1 DROP: 50 SOLUTION OPHTHALMIC at 23:40

## 2017-07-16 RX ADMIN — DORZOLAMIDE HYDROCHLORIDE AND TIMOLOL MALEATE 1 DROP: 20; 5 SOLUTION/ DROPS OPHTHALMIC at 18:00

## 2017-07-16 RX ADMIN — CALCIUM GLUCONATE 1 G: 94 INJECTION, SOLUTION INTRAVENOUS at 22:14

## 2017-07-16 RX ADMIN — CEFTRIAXONE 1 G: 1 INJECTION, POWDER, FOR SOLUTION INTRAMUSCULAR; INTRAVENOUS at 21:20

## 2017-07-16 RX ADMIN — DEXTROSE 50 % IN WATER (D50W) INTRAVENOUS SYRINGE 25 G: at 21:49

## 2017-07-16 RX ADMIN — SODIUM CHLORIDE 40 MG: 9 INJECTION INTRAMUSCULAR; INTRAVENOUS; SUBCUTANEOUS at 11:18

## 2017-07-16 RX ADMIN — POTASSIUM CHLORIDE 40 MEQ: 1500 TABLET, FILM COATED, EXTENDED RELEASE ORAL at 11:18

## 2017-07-16 RX ADMIN — ALLOPURINOL 200 MG: 100 TABLET ORAL at 00:55

## 2017-07-16 RX ADMIN — SENNOSIDES 8.6 MG: 8.6 TABLET, FILM COATED ORAL at 18:00

## 2017-07-16 RX ADMIN — Medication 10 ML: at 06:33

## 2017-07-16 RX ADMIN — POTASSIUM CHLORIDE 40 MEQ: 1500 TABLET, FILM COATED, EXTENDED RELEASE ORAL at 17:29

## 2017-07-16 RX ADMIN — FUROSEMIDE 20 MG: 10 INJECTION, SOLUTION INTRAMUSCULAR; INTRAVENOUS at 21:40

## 2017-07-16 RX ADMIN — Medication 10 ML: at 22:51

## 2017-07-16 RX ADMIN — ACETAMINOPHEN 325 MG: 325 TABLET, FILM COATED ORAL at 22:14

## 2017-07-16 RX ADMIN — SODIUM CHLORIDE 10 MG/HR: 900 INJECTION, SOLUTION INTRAVENOUS at 22:44

## 2017-07-16 RX ADMIN — INSULIN HUMAN 10 UNITS: 100 INJECTION, SOLUTION PARENTERAL at 21:51

## 2017-07-16 RX ADMIN — ATORVASTATIN CALCIUM 10 MG: 20 TABLET, FILM COATED ORAL at 11:18

## 2017-07-16 NOTE — CONSULTS
Consult Note    Patient: Ahsan Gaitan               Sex: male          DOA: 7/15/2017         YOB: 1945      Age:  67 y.o.        LOS:  LOS: 1 day              HPI:     Ahsan Gaitan is a 67 y.o. 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 He has DM, HTN, CHF and chronic renal insufficiency. He resides at [facility]. He was sent to the ER with altered mental status, and found to have brown, heme positive stools. Hg in the ER was 6.3 gm/dl. He was given two units of PRBC. Today Hg checks were 13.9, 14.1 and 14.4. There was no hx from the nursing home of witnessed hematemesis, hematochezia, or melena, abdominal pain, or vomiting, anorexia or weight loss. This patient was evaluated by this examiner for heme positive stools and iron deficiency anemia in May 2008. He had an EGD and a colonoscopy. He had a small hiatus hernia, and non-specific, non-erosive gastritis. Biopsies from the stomach and duodenum showed no important findings. The colonoscopy showed two diminutive tubular adenomas, diverticulosis, and internal hemorrhoids. Surveillance colonoscopy was recommended after five years but was not accomplished. He was seen by Dr Jordy Box in March of 2016 for similar reasons. He recommended an EGD and a colonoscopy but it looks like only the EGD was done, and showed gastritis with small specks of coffee ground material. Biopsies showed no important findings. Past Medical History:   Diagnosis Date    CHF (congestive heart failure) (HCC)     DM (diabetes mellitus) ([facility] [address]     Gout     HTN (hypertension)        History reviewed. No pertinent surgical history. History reviewed. No pertinent family history.     Social History     Social History    Marital status: SINGLE     Spouse name: N/A    Number of children: N/A    Years of education: N/A     Social History Main Topics    Smoking status: Never Smoker    Smokeless tobacco: Never Used    Alcohol use No    Drug use: No    Sexual activity: Not Asked     Other Topics Concern    None     Social History Narrative       Prior to Admission medications    Medication Sig Start Date End Date Taking? Authorizing Provider   mineral oil-hydrophil petrolat (AQUAPHOR) ointment Apply  to affected area as needed for Dry Skin. Yes Emily Leyva MD   budesonide (PULMICORT) 0.5 mg/2 mL nbsp 500 mcg by Nebulization route two (2) times a day. Yes Emily Leyva MD   bumetanide (BUMEX) 1 mg tablet Take 2 mg by mouth two (2) times a day. Yes Emily Leyva MD   metOLazone (ZAROXOLYN) 2.5 mg tablet Take 2.5 mg by mouth two (2) times a day. Yes Emily Leyva MD   spironolactone (ALDACTONE) 100 mg tablet Take 200 mg by mouth daily. Eric Leyva MD   insulin aspart (NOVOLOG) 100 unit/mL injection 0-10 Units by SubCUTAneous route Before breakfast and dinner. Sliding scale   Indications: type 2 diabetes mellitus   Yes Emily Leyva MD   allopurinol (ZYLOPRIM) 100 mg tablet Take 200 mg by mouth every other day. Yes Emily Leyva MD   amLODIPine (NORVASC) 5 mg tablet Take 5 mg by mouth daily. Yes Emily Leyva MD   atorvastatin (LIPITOR) 10 mg tablet Take 10 mg by mouth daily. Yes Emily Leyva MD   latanoprost (XALATAN) 0.005 % ophthalmic solution Administer 1 Drop to both eyes nightly. Eric Leyva MD   nitroglycerin (NITRODUR) 0.4 mg/hr 1 Patch by TransDERmal route daily. Yes Emily Leyva MD   potassium chloride (K-DUR, KLOR-CON) 20 mEq tablet Take 40 mEq by mouth two (2) times a day. Yes Emily Leyva MD   cholecalciferol (VITAMIN D3) 1,000 unit cap Take 1,000 Units by mouth daily. Yes Emily Leyva MD   brimonidine (ALPHAGAN) 0.2 % ophthalmic solution Administer 1 Drop to both eyes two (2) times a day. Eric Leyva MD   dorzolamide-timolol (COSOPT) 22.3-6.8 mg/mL ophthalmic solution Administer 1 Drop to both eyes two (2) times a day.    Yes Emily Leyva MD   insulin glargine (LANTUS SOLOSTAR) 100 unit/mL (3 mL) pen 20 Units by SubCUTAneous route two (2) times a day.   Yes Emily Leyva MD   senna (SENOKOT) 8.6 mg tablet Take 1 Tab by mouth two (2) times a day.    Yes Phys Other, MD       No Known Allergies    Review of Systems  Not obtainable due to patient being deaf and non-verbal.      Physical Exam:      Visit Vitals    /82    Pulse (!) 123    Temp 98.6 °F (37 °C)    Resp 20    Ht 6' (1.829 m)    Wt 90.6 kg (199 lb 11.2 oz)    SpO2 100%    BMI 27.08 kg/m2       Physical Exam:  Constitutional: negative  Eyes: negative  Ears, nose, mouth, throat, and face: negative  Respiratory: negative  Cardiovascular: negative  Gastrointestinal: negative, soft, non-tender, no organomegaly, no masses  Genitourinary:negative  Integument/breast: negative  Hematologic/lymphatic: negative  Musculoskeletal:negative  Neurological: negative  Behavioral/Psych: negative  Endocrine: negative    Labs Reviewed:  BMP:   Lab Results   Component Value Date/Time     07/16/2017 08:48 AM    K 6.0 (H) 07/16/2017 08:48 AM     (H) 07/16/2017 08:48 AM    CO2 20 (L) 07/16/2017 08:48 AM    AGAP 14 07/16/2017 08:48 AM    GLU 99 07/16/2017 08:48 AM    BUN 85 (H) 07/16/2017 08:48 AM    CREA 4.68 (H) 07/16/2017 08:48 AM    GFRAA 15 (L) 07/16/2017 08:48 AM    GFRNA 12 (L) 07/16/2017 08:48 AM     CMP:   Lab Results   Component Value Date/Time     07/16/2017 08:48 AM    K 6.0 (H) 07/16/2017 08:48 AM     (H) 07/16/2017 08:48 AM    CO2 20 (L) 07/16/2017 08:48 AM    AGAP 14 07/16/2017 08:48 AM    GLU 99 07/16/2017 08:48 AM    BUN 85 (H) 07/16/2017 08:48 AM    CREA 4.68 (H) 07/16/2017 08:48 AM    GFRAA 15 (L) 07/16/2017 08:48 AM    GFRNA 12 (L) 07/16/2017 08:48 AM    CA 9.5 07/16/2017 08:48 AM    ALB 2.4 (L) 07/16/2017 05:55 AM    TP 8.3 (H) 07/16/2017 05:55 AM    GLOB 5.9 (H) 07/16/2017 05:55 AM    AGRAT 0.4 (L) 07/16/2017 05:55 AM    SGOT 28 07/16/2017 05:55 AM    ALT 20 07/16/2017 05:55 AM     CBC:   Lab Results   Component Value Date/Time    WBC 14.4 (H) 07/16/2017 08:48 AM    HGB 14.6 07/16/2017 05:30 PM    HCT 46.9 07/16/2017 05:30 PM     07/16/2017 08:48 AM     COAGS:   Lab Results   Component Value Date/Time    PTP 16.0 (H) 07/16/2017 08:48 AM    INR 1.3 (H) 07/16/2017 08:48 AM             Assessment/Plan     Active Problems:    Lower GI bleed (7/15/2017)      Altered mental status (7/15/2017)      CKD (chronic kidney disease) stage 3, GFR 30-59 ml/min (7/15/2017)      Urinary tract infection without hematuria (7/15/2017)      Jesus Vu is a 66 yo man who is deaf and non-verbal, transferred from NH with altered mental status, and found in the ER to have hemoccult positive browns stools, and to be profoundly anemic with a hg of 6.3 which after two units of blood went up to 14 (confirmed three times). This most certainly suggests that the initial Hg of 6.3 was a lab error and that the patient's true initial Hg was closer to 12 gm/dl which is very likely his baseline given his chronic renal disease, This leaves us with explaining the occult blood in the stools, a problem that he was evaluated for as far back as 2008 with an EGD and a colonoscopy performed by this examiner. He had inconsequential findings of a small hiatus hernia, gastritis, two diminutive tubular adenomas, diverticulosis, and hemorrhoids. He had a repeat EGD last year in March that showed no important findings. The differential dx for occult blood in the stools includes acid peptic disease, vascular, inflammatory and remotely a neoplastic lesion of the GI tract. Will schedule an EGD with push enteroscopy for tomorrow. If negative would do a colonoscopy on Tuesday if we are able to prep him. If negative will consider an outpatient pillcam of the small bowel.     Delphia Cheadle, MD.

## 2017-07-16 NOTE — PROGRESS NOTES
Patient has designated his brother to participate in his/her discharge plan and to receive any needed information.      Name:   Chen Clarke  brother   [phone]       July 16, 2017     Address:  Phone number:

## 2017-07-16 NOTE — PROGRESS NOTES
conducted an initial consultation and Spiritual Assessment for Cruz Weems, who is a 67 y. o.,male. Patients Primary Language is: [place]. According to the patients EMR Moravian Affiliation is: [facility].     The reason the Patient came to the hospital is:   Patient Active Problem List    Diagnosis Date Noted    Lower GI bleed 07/15/2017    Altered mental status 07/15/2017    CKD (chronic kidney disease) stage 3, GFR 30-59 ml/min 07/15/2017    Urinary tract infection without hematuria 07/15/2017    NEPTALI (acute kidney injury) ([facility] [address] 02/15/2017    Febrile illness, acute 02/15/2017    Acute upper GI bleed 03/15/2016        The  provided the following Interventions:  Initiated a relationship of care and support. Listened empathically. Provided information about Spiritual Care Services. Offered prayer and assurance of continued prayers on patient's behalf. Chart reviewed. The following outcomes were achieved:  Patient processed feeling about current hospitalization. Patient expressed gratitude for 's visit. Assessment:  Patient does not have any Congregation/cultural needs that will affect patients preferences in health care. There are no further spiritual or Congregation issues which require intervention at this time. Plan:  Chaplains will continue to follow and will provide pastoral care on an as needed/requested basis.  recommends bedside caregivers page  on duty if patient shows signs of acute spiritual or emotional distress. Victornia De La Cruz M.Div.   [facility] [address]  [phone]

## 2017-07-16 NOTE — PROGRESS NOTES
[facility]   Discharge Planning/ Assessment     Reasons for Intervention:   Attempted to interview patient, he was able to nod yes or no to questions. He confirmed his brother is his NOK and agrees to share his discharge information with him, see below. He live at [facility] where he uses a walker to assist with ambulation. He is seen by Dr Moy Du for his primary care needs.   Discharge plan is to return to [facility].      High Risk Criteria  [x] Yes                []No   Physician Referral  [] Yes                [x]No        Date     Nursing Referral  [] Yes                [x]No        Date     Patient/Family Request  [] Yes                [x]No        Date         Resources:     Medicare  [x] Yes                []No   Medicaid  [x] Yes                []No   No Resources  [] Yes                [x]No   Private Insurance  [] Yes                [x]No    Name/Phone Number     Other  [] Yes                [x]No        (i.e. Workman's Comp)         Prior Services:     Prior Services  [x] Yes                []No   Home Health  [] Yes                [x]No   [facility]  [] Yes                [x]No        Number of [address] Program  [] Yes                [x]No        Meals on Wheels  [] Yes                [x]No   Office on Aging  [] Yes                [x]No   Transportation Services  [x] Yes                []No   Nursing Home  [x] Yes                []No        Nursing Home Name [address]  [] Yes                [x]No        [address] Name     Other         Information Source:                  Information obtained from  [x] Patient  [] Parent   [] [address]  [] Child  [] Spouse   [] Significant Other/Partner   [] Friend      [] EMS    [] Nursing Home Chart          [x] Other: chart   Chart Review  [] Yes                []No      Family/Support System:     Patient lives with  [] Alone    [] Spouse   [] Significant Other  [] Children  [] Caretaker   [] Parent  [] Sibling     [x] Other snf                           Other Support System:     Is the patient responsible for care of others  [] Yes                []No   Information of person caring for patient on  discharge consulate   Managers financial affairs independently  [] Yes                []No   If no, explain:        Status Prior to Admission:     Mental Status  [x] Awake  [x] Alert  [] Oriented  [] Quiet/Calm [] Lethargic/Sedated   [] Disoriented  [] Restless/Anxious  [] Combative   Personal Care  [] Dependent  [] [address]  [x] Requires Assistance   Meal Preparation Ability  [] Independent   [] Standby Assistance   [] Minimal Assistance   [] Moderate Assistance  [x] Maximum Assistance     [] Total Assistance   Chores  [] Independent with Chores   [] N/A Nursing Home Resident   [x] Requires Assistance   Bowel/Bladder  [] Continent  [] Catheter  [x] Incontinent  [] Ostomy Self-Care    [] Urine Diversion Self-Care  [] Maximum Assistance     [] Total Assistance   Number of Persons needed for assistance     DME at home  [] Rosa Ramirez  [] Leena Ramirez   [] Commode    [] Bathroom/Grab Bars  [] Hospital Bed  [] Nebulizer  [] Oxygen           [] Raised Toilet Seat  [] Shower Chair  [] Side Rails for Bed   [] Tub Transfer Bench   [x] Lee Monaco  [] Darion Mcfarlane      [] Other:   Vendor        Treatment Presently Receiving:     Current Treatments  [] Chemotherapy  [] Dialysis  [] Insulin  [x] IVAB [x] IVF   [] O2  [] PCA   [x] PT   [] RT   [] Tube Feedings   [] Wound Care      Psychosocial Evaluation:     Verbalized Knowledge of Disease Process  [] Patient                       [x]Family   Coping with Disease Process  [] Patient                       [x]Family   Requires Further Counseling Coping with Disease Process  [] Patient                       []Family      Identified Projected Needs:  [address] Aid  [] Yes                [x]No   Transportation  [x] Yes []No   Education  [] Yes                [x]No        Specific Education      Financial Counseling  [] Yes                [x]No   Inability to Care for Self/Will Require 24 hour care  [x] Yes                []No   Pain Management  [] Yes                [x]No   Home Infusion Therapy  [] Yes                [x]No   Oxygen Therapy  [] Yes                [x]No   DME  [] Yes                [x]No   Long Term Care Placement  [] Yes                [x]No   Rehab  [x] Yes                []No   Physical Therapy  [x] Yes                []No   Needs Anticipated At This Time  [x] Yes                []No      Intra-Hospital Referral:  [address] [place] [address]  [] Yes                [x]No     [] Yes                [x]No   Patient Representative  [] Yes                [x]No   Staff for Teaching Needs  [] Yes                [x]No   Specialty Teaching Needs      Diabetic Educator  [] Yes                [x]No   Referral for Diabetic Educator Needed  [] Yes                [x]No  If Yes, place order for Nutritionist or Diabetic Consult      Tentative Discharge Plan:     Home with No Services  [] Yes                [x]No   Home with Home Health Follow-up  [] Yes                [x]No        If Yes, specify type     Home Care Program  [] Yes                [x]No        If Yes, specify type     Meals on Wheels  [] Yes                [x]No   Office of Aging  [] Yes                [x]No   NHP  [] Yes                [x]No   Return to the Nursing Home  [x] Yes                []No   Rehab Therapy  [x] Yes                []No   Acute Rehab  [] Yes                [x]No   Subacute Rehab  [x] Yes                []No   Private Care  [] Yes                [x]No   Substance Abuse Referral  [] Yes                [x]No   Transportation  [] Yes                [x]No   Chore Service  [] Yes                [x]No   Inpatient Hospice  [] Yes                [x]No   OP RT  [] Yes                [x] No   OP Hemo  [] Yes                [x] No OP PT  [] Yes                [x]No   Support Group  [] Yes                [x]No   Reach to Recovery  [] Yes                [x]No   OP Oncology Clinic  [] Yes                [x]No   Clinic Appointment  [] Yes                [x]No   DME  [] Yes                [x]No   Comments     Name of D/C Planner or  Given to Patient or Family Nimisha Whitten RN   Phone Number [phone] [facility]   Date July 16, 2017   Time 751 am   If you are discharged home, whom do you designate to participate in your discharge plan and receive any information needed?      Enter name of Iliana Modi  brother        Phone # of designee         Address of designee          Updated July 16, 2017        Patient refused to designate any           individual

## 2017-07-16 NOTE — PROGRESS NOTES
0290 -- Bedside and Verbal shift change report given to [address]) by Pamela (offgoing nurse). Report included the following information SBAR, Kardex and Procedure Summary. [facility].     0830 -- Paged MD Renate Garcia concerned that lab valves are incorrect, STAT order placed for H/H, BMP, PT/INR moving forward, H/H and PT/INR will be done q6 for 2 additional occurences.       8274-- AM medications administered  pt tolerated with ease, will continue to monitor.        1154 -- Shift reassessment, pt condition unchanged, will continue to monitor.       1740-- Shift reassessment, pt condition unchanged, will continue to monitor.      1900  -- Bedside and Verbal shift change report given to (oncoming nurse) by Pamela (offgoing nurse).  Report included the following information SBAR, Kardex and Procedure Summary

## 2017-07-16 NOTE — PROGRESS NOTES
Progress Note      Patient: Rober Meza               Sex: male          DOA: 7/15/2017       YOB: 1945      Age:  67 y.o.        LOS:  LOS: 1 day               Subjective:   Pt's h/h is staying stable he is tachycardic however for reasons not clear . He is a deaqf mute and no history could be obtained he has been transfused earlier with 2 unit s of prbcs . The labs were repeated this am as his h/h went from 6.3/21.4 to 13.9/44.4 with two units of prbcs . His potassium is 6.0.  not  sure what is real and what is not . His labs will be repeated in the am . Suspect that the cbc on admission was an error . The pt has a uti he is afebrile whether the uti is responsible for his change of mental status is unclear will dc the oral potassium chloride . Objective:      Visit Vitals    /82    Pulse (!) 123    Temp 98.6 °F (37 °C)    Resp 20    Ht 6' (1.829 m)    Wt 90.6 kg (199 lb 11.2 oz)    SpO2 100%    BMI 27.08 kg/m2       Physical Exam:  Pt is awake but does not communicate   Heart reg rate and rhythm   Lungs fair breath sounds heqrd   Abdomen soft and no pain on palpation . pt is obese   Neuro pt is a deaf mute       Lab/Data Reviewed:  CMP:   Lab Results   Component Value Date/Time     07/16/2017 08:48 AM    K 6.0 (H) 07/16/2017 08:48 AM     (H) 07/16/2017 08:48 AM    CO2 20 (L) 07/16/2017 08:48 AM    AGAP 14 07/16/2017 08:48 AM    GLU 99 07/16/2017 08:48 AM    BUN 85 (H) 07/16/2017 08:48 AM    CREA 4.68 (H) 07/16/2017 08:48 AM    GFRAA 15 (L) 07/16/2017 08:48 AM    GFRNA 12 (L) 07/16/2017 08:48 AM    CA 9.5 07/16/2017 08:48 AM    ALB 2.4 (L) 07/16/2017 05:55 AM    TP 8.3 (H) 07/16/2017 05:55 AM    GLOB 5.9 (H) 07/16/2017 05:55 AM    AGRAT 0.4 (L) 07/16/2017 05:55 AM    SGOT 28 07/16/2017 05:55 AM    ALT 20 07/16/2017 05:55 AM     CBC:   Lab Results   Component Value Date/Time    WBC 14.4 (H) 07/16/2017 08:48 AM    HGB 14.4 07/16/2017 11:20 AM    HCT 45.9 07/16/2017 11:20 AM     07/16/2017 08:48 AM           Assessment/Plan     Active Problems:    Lower GI bleed (7/15/2017) doubt any significant bleed due to lab error . Altered mental status (7/15/2017)      CKD (chronic kidney disease) stage 3, GFR 30-59 ml/min (7/15/2017)      Urinary tract infection without hematuria (7/15/2017)  Elevated potassium . will dc the oral kcl      Plan:will follow the pt's h/h in the am he may need  A colonoscopy at some point r/o hemorrhoids . etc . willtreat the uti even though  He is not febrile but he has had a change of mental status

## 2017-07-16 NOTE — PROGRESS NOTES
2130- Assumed care of deaf, non-verbal patient. Patient able to read lips and communicate with yes or no questions. Patient nods appropriately. Patient abdomen is distended and nods \"yes\" when asked if tender. Condom cath in place. Skin is intact. SCD's applied. Patient currently with 3L of oxygen via NC. Will attempt to titrate. 2330- 1 unit of blood initiated. V/s obtained per protocol. H&H rescheduled for 0300 at this time, due to transfusion administration. Oxygen to 2L at this time, saturations WDL. 0230- Transfusion complte. No reaction suspected. Patient resting comfortably. V/S WDL. 0630- Spoke with lab, attempts made to locate H&H that was scheduled for 0300. El Waddell, followed up and informed this nurse that the H&H was completed within scheduled time frame, but has not yet been processed. Will pass information to oncsotero nurse. 0640-Aerobic and  Anaerobic cocci clusters x's 2 culture, per Glenys Rick, called from [place], in lab. 26- Received critical potassium (6.2) from lab. MD Dsouza Exon paged. Sherrill RN notified. This nurse called [facility], [facility] patient resides at, to obtain admission paperwork. Unable to reach nurse. Telephone number, and unit number (1B) given to sherrill Rn to f/u.

## 2017-07-16 NOTE — PROGRESS NOTES
2130- Assumed care of deaf, non-verbal patient. Patient able to read lips and communicate with yes or no questions. Patient nods appropriately. Patient abdomen is distended and nods \"yes\" when asked if tender. Condom cath in place. Skin is intact. SCD's applied. Patient currently with 3L of oxygen via NC. Will attempt to titrate. 2330- 1 unit of blood initiated. V/s obtained per protocol. H&H rescheduled for 0300 at this time, due to transfusion administration. Oxygen to 2L at this time, saturations WDL. 0230- Transfusion complte. No reaction suspected. Patient resting comfortably. V/S WDL. 0630- Spoke with lab, attempts made to locate H&H that was scheduled for 0300. Juliet Salazar, followed up and informed this nurse that the H&H was completed within scheduled time frame, but has not yet been processed. Will pass information to oncoming nurse. 0640-Aerobic and  Anaerobic cocci clusters x's 2 culture, per Maria Esther Galeana, called from Juliet Salazar, in lab.

## 2017-07-16 NOTE — H&P
2 [facility]  Hospitalist Division      History & Physical    Patient: Terrea Rubinstein MRN: [id number]  CSN: [id number]    YOB: 1945  Age: 67 y.o. Sex: male    DOA: 7/15/2017 LOS:  LOS: 0 days        DOA: 7/15/2017        Assessment/Plan     Active Problems:    Lower GI bleed (7/15/2017)      Altered mental status (7/15/2017)      CKD (chronic kidney disease) stage 3, GFR 30-59 ml/min (7/15/2017)      Urinary tract infection without hematuria (7/15/2017)        Plan:  1. LGIB - Heme positive stools - very mild per ER staff - Protonix IV Bid , clear liquids , consult GI - Called from ER  2. Anemia 2y to #1 - Type & cross match & transfuse 2 units PRBC   Pt is also noted to be on coumadin - has received Vit K in the ER - no active source of bleeding so will hold giving FFP for now , repeat INR in AM   3. UTI - IV Rocephin   4. AMS - now improved per ER staff   5. Hx deaf & mute   6. CKD 3 - monitor creatinine   7. Continue other N home meds   DVT px - SCD   Full Code                 HPI:     Terrea Rubinstein is a 67 y.o. male who is deaf & mute & a resident of [facility] in [facility]. He was brought to the ER 2y to AMS. Per ER , the staff at the [facility] mentioned that the pt was noted to have AMS. Being deaf & mute he can read lips & write but he was unable to do that today. ER eval showed the pt have an anemia with hgb in the 6 range. Rectal exam was heme positive. Will admit the pt to the hosp for further eval , Transfuse PRBC , consult GI. Past Medical History:   Diagnosis Date    CHF (congestive heart failure) (HCC)     DM (diabetes mellitus) ([facility]     Gout     HTN (hypertension)        History reviewed. No pertinent surgical history. History reviewed. No pertinent family history.     Social History     Social History    Marital status: SINGLE     Spouse name: N/A    Number of children: N/A    Years of education: N/A     Social History Main Topics    Smoking status: Never Smoker    Smokeless tobacco: Never Used    Alcohol use No    Drug use: No    Sexual activity: Not Asked     Other Topics Concern    None     Social History Narrative       Prior to Admission medications    Medication Sig Start Date End Date Taking? Authorizing Provider   insulin lispro (HUMALOG) 100 unit/mL injection For Blood Sugar (mg/dL) of:     Less than 150 =   0 units           150 -199 =   2 units  200 -249 =   4 units  250 -299 =   6 units  300 -349 =   8 units  350 and above = 10 units and Call Physician 2/17/17   Birgit Clay NP   levoFLOXacin (LEVAQUIN) 500 mg tablet Take 1 Tab by mouth daily. 2/17/17   Birgit Clay NP   allopurinol (ZYLOPRIM) 100 mg tablet Take 200 mg by mouth every other day. Emily Leyva MD   amLODIPine (NORVASC) 5 mg tablet Take 5 mg by mouth daily. Emily Leyva MD   atorvastatin (LIPITOR) 10 mg tablet Take 10 mg by mouth daily. Emily Leyva MD   latanoprost (XALATAN) 0.005 % ophthalmic solution Administer 1 Drop to both eyes nightly. Emily Leyva MD   nitroglycerin (NITRODUR) 0.4 mg/hr 1 Patch by TransDERmal route daily. Emily Leyva MD   potassium chloride (K-DUR, KLOR-CON) 20 mEq tablet Take 40 mEq by mouth two (2) times a day. Emily Leyva MD   spironolactone (ALDACTONE) 50 mg tablet Take 50 mg by mouth daily. Emily Leyva MD   cholecalciferol (VITAMIN D3) 1,000 unit cap Take 1,000 Units by mouth daily. Emily Leyva MD   brimonidine (ALPHAGAN) 0.2 % ophthalmic solution Administer 1 Drop to both eyes two (2) times a day. Emily Leyva MD   dorzolamide-timolol (COSOPT) 22.3-6.8 mg/mL ophthalmic solution Administer 1 Drop to both eyes two (2) times a day. Emily Leyva MD   insulin glargine (LANTUS SOLOSTAR) 100 unit/mL (3 mL) pen 20 Units by SubCUTAneous route two (2) times a day. Emily Leyva MD   senna (SENOKOT) 8.6 mg tablet Take 1 Tab by mouth two (2) times a day.     Emily Leyva MD       No Known Allergies    Review of Systems  Review of systems not obtained due to patient factors.     ROS - negative except as mentioned in HPI            Physical Exam:      Visit Vitals    /57    Pulse 100    Temp 100.2 °F (37.9 °C)    Resp 27    Ht 6' (1.829 m)    Wt 89.4 kg (197 lb)    SpO2 95%    BMI 26.72 kg/m2       Physical Exam:  Constitutional: moderately built male - lying in bed   Eyes: PERRL, EOM normal   Ears, nose, mouth, throat, and face: poor dentition   Respiratory: CTA B/L   Cardiovascular: S1 S2 RRR  Gastrointestinal: Soft , NT, ND , BS +   Musculoskeletal:no edema   Neurological: awake , lying in bed     Labs Reviewed:    CMP:   Lab Results   Component Value Date/Time     (H) 07/15/2017 03:18 PM    K 3.6 07/15/2017 03:18 PM     (H) 07/15/2017 03:18 PM    CO2 12 (L) 07/15/2017 03:18 PM    AGAP 10 07/15/2017 03:18 PM     (H) 07/15/2017 03:18 PM    BUN 52 (H) 07/15/2017 03:18 PM    CREA 2.41 (H) 07/15/2017 03:18 PM    GFRAA 32 (L) 07/15/2017 03:18 PM    GFRNA 27 (L) 07/15/2017 03:18 PM    CA <5.0 (LL) 07/15/2017 03:18 PM    ALB 1.1 (L) 07/15/2017 03:18 PM    TP 3.9 (L) 07/15/2017 03:18 PM    GLOB 2.8 07/15/2017 03:18 PM    AGRAT 0.4 (L) 07/15/2017 03:18 PM    SGOT 17 07/15/2017 03:18 PM    ALT 12 (L) 07/15/2017 03:18 PM     CBC:   Lab Results   Component Value Date/Time    WBC 8.7 07/15/2017 03:18 PM    HGB 6.3 (L) 07/15/2017 03:18 PM    HCT 21.4 (L) 07/15/2017 03:18 PM     07/15/2017 03:18 PM     Liver Panel:   Lab Results   Component Value Date/Time    ALB 1.1 (L) 07/15/2017 03:18 PM    TP 3.9 (L) 07/15/2017 03:18 PM    GLOB 2.8 07/15/2017 03:18 PM    AGRAT 0.4 (L) 07/15/2017 03:18 PM    SGOT 17 07/15/2017 03:18 PM    ALT 12 (L) 07/15/2017 03:18 PM    AP 77 07/15/2017 03:18 PM       Imaging Reviewed:  CT head   Portable CXR         Caroline Hernandez MD  7/15/2017, 8:15 PM

## 2017-07-17 ENCOUNTER — APPOINTMENT (OUTPATIENT)
Dept: GENERAL RADIOLOGY | Age: 72
DRG: 377 | End: 2017-07-17
Attending: INTERNAL MEDICINE
Payer: MEDICARE

## 2017-07-17 LAB
ALBUMIN SERPL BCP-MCNC: 2.3 G/DL (ref 3.4–5)
ALBUMIN/GLOB SERPL: 0.4 {RATIO} (ref 0.8–1.7)
ALP SERPL-CCNC: 191 U/L (ref 45–117)
ALT SERPL-CCNC: 19 U/L (ref 16–61)
ANION GAP BLD CALC-SCNC: 13 MMOL/L (ref 3–18)
APPEARANCE UR: ABNORMAL
AST SERPL W P-5'-P-CCNC: 46 U/L (ref 15–37)
ATRIAL RATE: 106 BPM
ATRIAL RATE: 134 BPM
BACTERIA SPEC CULT: ABNORMAL
BACTERIA URNS QL MICRO: ABNORMAL /HPF
BASOPHILS # BLD AUTO: 0 K/UL (ref 0–0.06)
BASOPHILS # BLD: 0 % (ref 0–2)
BILIRUB SERPL-MCNC: 1.2 MG/DL (ref 0.2–1)
BILIRUB UR QL: NEGATIVE
BUN SERPL-MCNC: 83 MG/DL (ref 7–18)
BUN/CREAT SERPL: 18 (ref 12–20)
CA-I SERPL-SCNC: 1.21 MMOL/L (ref 1.12–1.32)
CALCIUM SERPL-MCNC: 9.5 MG/DL (ref 8.5–10.1)
CALCULATED P AXIS, ECG09: -26 DEGREES
CALCULATED R AXIS, ECG10: -63 DEGREES
CALCULATED R AXIS, ECG10: -65 DEGREES
CALCULATED T AXIS, ECG11: 94 DEGREES
CALCULATED T AXIS, ECG11: 98 DEGREES
CHLORIDE SERPL-SCNC: 113 MMOL/L (ref 100–108)
CO2 SERPL-SCNC: 20 MMOL/L (ref 21–32)
COLOR UR: YELLOW
CREAT SERPL-MCNC: 4.68 MG/DL (ref 0.6–1.3)
DIAGNOSIS, 93000: NORMAL
DIAGNOSIS, 93000: NORMAL
DIFFERENTIAL METHOD BLD: ABNORMAL
EOSINOPHIL # BLD: 0.1 K/UL (ref 0–0.4)
EOSINOPHIL NFR BLD: 0 % (ref 0–5)
ERYTHROCYTE [DISTWIDTH] IN BLOOD BY AUTOMATED COUNT: 18.1 % (ref 11.6–14.5)
EST. AVERAGE GLUCOSE BLD GHB EST-MCNC: 140 MG/DL
GLOBULIN SER CALC-MCNC: 6.4 G/DL (ref 2–4)
GLUCOSE BLD STRIP.AUTO-MCNC: 140 MG/DL (ref 70–110)
GLUCOSE BLD STRIP.AUTO-MCNC: 168 MG/DL (ref 70–110)
GLUCOSE BLD STRIP.AUTO-MCNC: 176 MG/DL (ref 70–110)
GLUCOSE BLD STRIP.AUTO-MCNC: 200 MG/DL (ref 70–110)
GLUCOSE SERPL-MCNC: 203 MG/DL (ref 74–99)
GLUCOSE UR STRIP.AUTO-MCNC: NEGATIVE MG/DL
HBA1C MFR BLD: 6.5 % (ref 4.2–5.6)
HCT VFR BLD AUTO: 48.1 % (ref 36–48)
HGB BLD-MCNC: 14.8 G/DL (ref 13–16)
HGB UR QL STRIP: ABNORMAL
INR PPP: 1.5 (ref 0.8–1.2)
IRON SATN MFR SERPL: 8 %
IRON SERPL-MCNC: 15 UG/DL (ref 50–175)
KETONES UR QL STRIP.AUTO: NEGATIVE MG/DL
LEUKOCYTE ESTERASE UR QL STRIP.AUTO: ABNORMAL
LYMPHOCYTES # BLD AUTO: 9 % (ref 21–52)
LYMPHOCYTES # BLD: 1.2 K/UL (ref 0.9–3.6)
MAGNESIUM SERPL-MCNC: 2.5 MG/DL (ref 1.6–2.6)
MCH RBC QN AUTO: 25.2 PG (ref 24–34)
MCHC RBC AUTO-ENTMCNC: 30.8 G/DL (ref 31–37)
MCV RBC AUTO: 81.9 FL (ref 74–97)
MONOCYTES # BLD: 0.4 K/UL (ref 0.05–1.2)
MONOCYTES NFR BLD AUTO: 3 % (ref 3–10)
NEUTS SEG # BLD: 11.9 K/UL (ref 1.8–8)
NEUTS SEG NFR BLD AUTO: 88 % (ref 40–73)
NITRITE UR QL STRIP.AUTO: NEGATIVE
P-R INTERVAL, ECG05: 184 MS
P-R INTERVAL, ECG05: 88 MS
PH UR STRIP: 5 [PH] (ref 5–8)
PHOSPHATE SERPL-MCNC: 4.6 MG/DL (ref 2.5–4.9)
PLATELET # BLD AUTO: 304 K/UL (ref 135–420)
PMV BLD AUTO: 10.2 FL (ref 9.2–11.8)
POTASSIUM SERPL-SCNC: 4.9 MMOL/L (ref 3.5–5.5)
POTASSIUM SERPL-SCNC: 5.3 MMOL/L (ref 3.5–5.5)
PROT SERPL-MCNC: 8.7 G/DL (ref 6.4–8.2)
PROT UR STRIP-MCNC: NEGATIVE MG/DL
PROTHROMBIN TIME: 17.1 SEC (ref 11.5–15.2)
Q-T INTERVAL, ECG07: 382 MS
Q-T INTERVAL, ECG07: 390 MS
QRS DURATION, ECG06: 114 MS
QRS DURATION, ECG06: 146 MS
QTC CALCULATION (BEZET), ECG08: 518 MS
QTC CALCULATION (BEZET), ECG08: 570 MS
RBC # BLD AUTO: 5.87 M/UL (ref 4.7–5.5)
RBC #/AREA URNS HPF: ABNORMAL /HPF (ref 0–5)
SERVICE CMNT-IMP: ABNORMAL
SODIUM SERPL-SCNC: 146 MMOL/L (ref 136–145)
SP GR UR REFRACTOMETRY: 1.01 (ref 1–1.03)
TIBC SERPL-MCNC: 181 UG/DL (ref 250–450)
UROBILINOGEN UR QL STRIP.AUTO: 1 EU/DL (ref 0.2–1)
VENTRICULAR RATE, ECG03: 106 BPM
VENTRICULAR RATE, ECG03: 134 BPM
WBC # BLD AUTO: 13.6 K/UL (ref 4.6–13.2)
WBC URNS QL MICRO: NEGATIVE /HPF (ref 0–4)

## 2017-07-17 PROCEDURE — 82330 ASSAY OF CALCIUM: CPT | Performed by: HOSPITALIST

## 2017-07-17 PROCEDURE — 83540 ASSAY OF IRON: CPT | Performed by: HOSPITALIST

## 2017-07-17 PROCEDURE — 87077 CULTURE AEROBIC IDENTIFY: CPT | Performed by: HOSPITALIST

## 2017-07-17 PROCEDURE — 74011636637 HC RX REV CODE- 636/637: Performed by: HOSPITALIST

## 2017-07-17 PROCEDURE — 74011250636 HC RX REV CODE- 250/636: Performed by: HOSPITALIST

## 2017-07-17 PROCEDURE — 74011000258 HC RX REV CODE- 258: Performed by: INTERNAL MEDICINE

## 2017-07-17 PROCEDURE — 85610 PROTHROMBIN TIME: CPT | Performed by: HOSPITALIST

## 2017-07-17 PROCEDURE — 83036 HEMOGLOBIN GLYCOSYLATED A1C: CPT | Performed by: HOSPITALIST

## 2017-07-17 PROCEDURE — 87186 SC STD MICRODIL/AGAR DIL: CPT | Performed by: HOSPITALIST

## 2017-07-17 PROCEDURE — 77010033678 HC OXYGEN DAILY

## 2017-07-17 PROCEDURE — 77030020257 HC SOL INJ SOD CL 0.45% 1000ML BG

## 2017-07-17 PROCEDURE — 87040 BLOOD CULTURE FOR BACTERIA: CPT | Performed by: HOSPITALIST

## 2017-07-17 PROCEDURE — 74011250637 HC RX REV CODE- 250/637: Performed by: HOSPITALIST

## 2017-07-17 PROCEDURE — C9113 INJ PANTOPRAZOLE SODIUM, VIA: HCPCS | Performed by: HOSPITALIST

## 2017-07-17 PROCEDURE — 74011000258 HC RX REV CODE- 258: Performed by: HOSPITALIST

## 2017-07-17 PROCEDURE — 65610000006 HC RM INTENSIVE CARE

## 2017-07-17 PROCEDURE — 84132 ASSAY OF SERUM POTASSIUM: CPT | Performed by: HOSPITALIST

## 2017-07-17 PROCEDURE — 36415 COLL VENOUS BLD VENIPUNCTURE: CPT | Performed by: HOSPITALIST

## 2017-07-17 PROCEDURE — 85025 COMPLETE CBC W/AUTO DIFF WBC: CPT | Performed by: HOSPITALIST

## 2017-07-17 PROCEDURE — 74011000250 HC RX REV CODE- 250: Performed by: HOSPITALIST

## 2017-07-17 PROCEDURE — 93306 TTE W/DOPPLER COMPLETE: CPT

## 2017-07-17 PROCEDURE — 82962 GLUCOSE BLOOD TEST: CPT

## 2017-07-17 PROCEDURE — 74000 XR ABD (KUB): CPT

## 2017-07-17 RX ORDER — INSULIN LISPRO 100 [IU]/ML
INJECTION, SOLUTION INTRAVENOUS; SUBCUTANEOUS EVERY 6 HOURS
Status: DISCONTINUED | OUTPATIENT
Start: 2017-07-17 | End: 2017-07-28 | Stop reason: HOSPADM

## 2017-07-17 RX ORDER — DEXTROSE 50 % IN WATER (D50W) INTRAVENOUS SYRINGE
25-50 AS NEEDED
Status: DISCONTINUED | OUTPATIENT
Start: 2017-07-17 | End: 2017-07-28 | Stop reason: HOSPADM

## 2017-07-17 RX ORDER — MAGNESIUM SULFATE 100 %
4 CRYSTALS MISCELLANEOUS AS NEEDED
Status: DISCONTINUED | OUTPATIENT
Start: 2017-07-17 | End: 2017-07-28 | Stop reason: HOSPADM

## 2017-07-17 RX ORDER — SODIUM CHLORIDE 450 MG/100ML
100 INJECTION, SOLUTION INTRAVENOUS CONTINUOUS
Status: DISCONTINUED | OUTPATIENT
Start: 2017-07-17 | End: 2017-07-18

## 2017-07-17 RX ADMIN — SODIUM CHLORIDE 100 ML/HR: 450 INJECTION, SOLUTION INTRAVENOUS at 18:53

## 2017-07-17 RX ADMIN — BRIMONIDINE TARTRATE 1 DROP: 2 SOLUTION OPHTHALMIC at 18:49

## 2017-07-17 RX ADMIN — Medication 10 ML: at 23:03

## 2017-07-17 RX ADMIN — SODIUM CHLORIDE 10 MG/HR: 900 INJECTION, SOLUTION INTRAVENOUS at 04:53

## 2017-07-17 RX ADMIN — CEFTRIAXONE 1 G: 1 INJECTION, POWDER, FOR SOLUTION INTRAMUSCULAR; INTRAVENOUS at 21:56

## 2017-07-17 RX ADMIN — INSULIN LISPRO 2 UNITS: 100 INJECTION, SOLUTION INTRAVENOUS; SUBCUTANEOUS at 18:46

## 2017-07-17 RX ADMIN — SENNOSIDES 8.6 MG: 8.6 TABLET, FILM COATED ORAL at 18:47

## 2017-07-17 RX ADMIN — SODIUM CHLORIDE 1000 MG: 900 INJECTION, SOLUTION INTRAVENOUS at 19:39

## 2017-07-17 RX ADMIN — ATORVASTATIN CALCIUM 10 MG: 20 TABLET, FILM COATED ORAL at 11:01

## 2017-07-17 RX ADMIN — LATANOPROST 1 DROP: 50 SOLUTION OPHTHALMIC at 22:04

## 2017-07-17 RX ADMIN — Medication 10 ML: at 15:01

## 2017-07-17 RX ADMIN — BRIMONIDINE TARTRATE 1 DROP: 2 SOLUTION OPHTHALMIC at 09:50

## 2017-07-17 RX ADMIN — Medication 10 ML: at 05:00

## 2017-07-17 RX ADMIN — DORZOLAMIDE HYDROCHLORIDE AND TIMOLOL MALEATE 1 DROP: 20; 5 SOLUTION/ DROPS OPHTHALMIC at 18:49

## 2017-07-17 RX ADMIN — DORZOLAMIDE HYDROCHLORIDE AND TIMOLOL MALEATE 1 DROP: 20; 5 SOLUTION/ DROPS OPHTHALMIC at 09:50

## 2017-07-17 RX ADMIN — SODIUM CHLORIDE 10 MG/HR: 900 INJECTION, SOLUTION INTRAVENOUS at 14:29

## 2017-07-17 RX ADMIN — SENNOSIDES 8.6 MG: 8.6 TABLET, FILM COATED ORAL at 11:01

## 2017-07-17 RX ADMIN — SODIUM CHLORIDE 40 MG: 9 INJECTION INTRAMUSCULAR; INTRAVENOUS; SUBCUTANEOUS at 08:29

## 2017-07-17 RX ADMIN — AMLODIPINE BESYLATE 5 MG: 5 TABLET ORAL at 11:01

## 2017-07-17 NOTE — PROGRESS NOTES
0700: assumed care from [address] [place] [address], [facility]. Bedside/ verbal report received, patient alert but nonverbal and deaf, able to communicate with communication board. Patient continues on 5liters NC at this time, will attempt to titrate down. Patient also continues on cardizem drip. HOB elevated, bed in low position, call bell in reach side rails x 3 for patient safety. Will continue to monitor. 1019: okay from Brand Ramp from Endo to give PO meds with sips of clear fluids. Spoke with Dr. Abner Egan requesting pain medications for patients complains of pain. No new orders at this time. Patient to be evaluated. 1930: Bedside and Verbal shift change report given to Iglesia Evans (oncoming nurse) by Ha Musa RN   (offgoing nurse). Report included the following information SBAR, ED Summary, Intake/Output, MAR, Recent Results, Cardiac Rhythm Sinus tach and Alarm Parameters .

## 2017-07-17 NOTE — PERIOP NOTES
TRANSFER - IN REPORT:    Verbal report received from Danielle on Maycol Velasquez  being received from [phone] for ordered procedure      Report consisted of patients Situation, Background, Assessment and   Recommendations(SBAR). Information from the following report(s) Kardex, Procedure Summary, MAR and Recent Results was reviewed with the receiving nurse. Opportunity for questions and clarification was provided.

## 2017-07-17 NOTE — CONSULTS
Consult Note    Assessment:   · NEPTALI on ckd 3. Etio- ischemic atn in a setting of uti/sepsis/gi bleeding. Non oliguric. · CKD 3 due to presumably dm/htn. · Hyperkalemia in context of neptali on ckd 3. Use of aldactone pta contributed to hyperkalemia. · Normal ag met acidosis. Improving. · Hypovolemic hyponatremia. · Staph aureus sepsis. Source? · E. Coli uti. · HTN. · Systolic chf.   · GI bleed, egd is planned for tomorrow. Recommendations:   · Resume ivf, will use 1/2 NS. Monitor for chf symptoms given very low ef.   · Monitor K.   · Continue cardizem drip, d/c amlodipine. · Continue abx. · Avoid NSAID's, IV dye. · Avoid Gadolinium due to its association with nephrogenic systemic fibrosis in a patients with severe ARF and ESRD. · Avoid fleets enemas due to concern for acute phosphate nephropathy. · Please dose all medications for approximate creatinine clearance <15. Thank you. Consult requested by: Padilla Gan DO    ADMIT DATE: 7/15/2017  CONSULT DATE: July 17, 2017                 Admission diagnosis: Lower GI bleed   Reason for Nephrology Consultation: neptali on ckd 3. HPI: Marielos Doan is a 67 y.o. male [address] with h/o of dm, htn, gout, ckd 3. H/o is based on medical records due to patient being deaf and mute. He was brought from local snf with altered ms. Patient was found to be tachycardic and febrile. His Hgb was 6.2 and he was found to have heme pos stool. After transfusion with 2 units of prbc's Hgb went up to 14 (initial might have been a lab error). Patient was admitted to icu. He is being treated with abx for sepsis. BC are growing staph aureus. Urine culture is growing E. Coli. Patient is seen by GI, egd is pending. Echo with ef of 15%. Baseline scr is in the high one's. Upon admission scr was 2.61, it is up to 4.68 today. K was up to 6.2, 5.3 today.         Past Medical History:   Diagnosis Date    [facility]     DM [facility]diabetes mellitus) ([facility]     Gout     HTN (hypertension)       History reviewed. No pertinent surgical history. Social History     Social History    Marital status: SINGLE     Spouse name: N/A    Number of children: N/A    Years of education: N/A     Occupational History    Not on file. Social History Main Topics    Smoking status: Never Smoker    Smokeless tobacco: Never Used    Alcohol use No    Drug use: No    Sexual activity: Not on file     Other Topics Concern    Not on file     Social History Narrative       History reviewed. No pertinent family history. No Known Allergies     Home Medications:     Prescriptions Prior to Admission   Medication Sig    mineral oil-hydrophil petrolat (AQUAPHOR) ointment Apply  to affected area as needed for Dry Skin.  budesonide (PULMICORT) 0.5 mg/2 mL nbsp 500 mcg by Nebulization route two (2) times a day.  bumetanide (BUMEX) 1 mg tablet Take 2 mg by mouth two (2) times a day.  metOLazone (ZAROXOLYN) 2.5 mg tablet Take 2.5 mg by mouth two (2) times a day.  spironolactone (ALDACTONE) 100 mg tablet Take 200 mg by mouth daily.  insulin aspart (NOVOLOG) 100 unit/mL injection 0-10 Units by SubCUTAneous route Before breakfast and dinner. Sliding scale   Indications: type 2 diabetes mellitus    allopurinol (ZYLOPRIM) 100 mg tablet Take 200 mg by mouth every other day.  amLODIPine (NORVASC) 5 mg tablet Take 5 mg by mouth daily.  atorvastatin (LIPITOR) 10 mg tablet Take 10 mg by mouth daily.  latanoprost (XALATAN) 0.005 % ophthalmic solution Administer 1 Drop to both eyes nightly.  nitroglycerin (NITRODUR) 0.4 mg/hr 1 Patch by TransDERmal route daily.  potassium chloride (K-DUR, KLOR-CON) 20 mEq tablet Take 40 mEq by mouth two (2) times a day.  cholecalciferol (VITAMIN D3) 1,000 unit cap Take 1,000 Units by mouth daily.     brimonidine (ALPHAGAN) 0.2 % ophthalmic solution Administer 1 Drop to both eyes two (2) times a day.  dorzolamide-timolol (COSOPT) 22.3-6.8 mg/mL ophthalmic solution Administer 1 Drop to both eyes two (2) times a day.  insulin glargine (LANTUS SOLOSTAR) 100 unit/mL (3 mL) pen 20 Units by SubCUTAneous route two (2) times a day.  senna (SENOKOT) 8.6 mg tablet Take 1 Tab by mouth two (2) times a day.        Current Inpatient Medications:     Current Facility-Administered Medications   Medication Dose Route Frequency    glucose chewable tablet 16 g  4 Tab Oral PRN    glucagon (GLUCAGEN) injection 1 mg  1 mg IntraMUSCular PRN    dextrose (D50W) injection syrg 12.5-25 g  25-50 mL IntraVENous PRN    insulin lispro (HUMALOG) injection   SubCUTAneous Q6H    [START ON 7/18/2017] pantoprazole (PROTONIX) 40 mg in sodium chloride 0.9 % 10 mL injection  40 mg IntraVENous DAILY    cefTRIAXone (ROCEPHIN) 1 g in 0.9% sodium chloride (MBP/ADV) 50 mL MBP  1 g IntraVENous Q24H    acetaminophen (TYLENOL) tablet 650 mg  650 mg Oral Q4H PRN    dilTIAZem (CARDIZEM) 100 mg in 0.9% sodium chloride (MBP/ADV) 100 mL infusion  0-15 mg/hr IntraVENous TITRATE    sodium chloride (NS) flush 5-10 mL  5-10 mL IntraVENous PRN    0.9% sodium chloride infusion 250 mL  250 mL IntraVENous PRN    allopurinol (ZYLOPRIM) tablet 200 mg  200 mg Oral EVERY OTHER DAY    amLODIPine (NORVASC) tablet 5 mg  5 mg Oral DAILY    atorvastatin (LIPITOR) tablet 10 mg  10 mg Oral DAILY    latanoprost (XALATAN) 0.005 % ophthalmic solution 1 Drop  1 Drop Both Eyes QHS    nitroglycerin (NITRODUR) 0.4 mg/hr patch 1 Patch  1 Patch TransDERmal DAILY    brimonidine (ALPHAGAN) 0.2 % ophthalmic solution 1 Drop  1 Drop Both Eyes BID    dorzolamide-timolol (COSOPT) 22.3-6.8 mg/mL ophthalmic solution 1 Drop  1 Drop Both Eyes BID    senna (SENOKOT) tablet 8.6 mg  1 Tab Oral BID    sodium chloride (NS) flush 5-10 mL  5-10 mL IntraVENous Q8H    sodium chloride (NS) flush 5-10 mL  5-10 mL IntraVENous PRN    ondansetron (ZOFRAN) injection 4 mg 4 mg IntraVENous Q6H PRN    0.9% sodium chloride infusion  75 mL/hr IntraVENous PRN       Review of Systems:   Unable. Physical Assessment:     Vitals:    07/17/17 1300 07/17/17 1400 07/17/17 1500 07/17/17 1600   BP: 121/68 (!) 122/35 106/56 108/44   Pulse: (!) 103 (!) 104 (!) 104 (!) 106   Resp: 30 (!) 32 30 29   Temp:       SpO2: 96%  97% 96%   Weight:       Height:         Last 3 Recorded Weights in this Encounter    07/15/17 2130 07/16/17 0028 07/16/17 2300   Weight: 90.5 kg (199 lb 8 oz) 90.6 kg (199 lb 11.2 oz) 86.9 kg (191 lb 9.6 oz)     Admission weight: Weight: 89.4 kg (197 lb) (07/15/17 1530)      Intake/Output Summary (Last 24 hours) at 07/17/17 1802  Last data filed at 07/17/17 1700   Gross per 24 hour   Intake           231.26 ml   Output              884 ml   Net          -652.74 ml       Patient is in no apparent distress. HEENT: Head is normocephalic and atraumatic. Pupils are round, equal, reactive to light. Sclerae are anicteric. Oral mucosa is dry. Neck: no cervical lymphadenopathy or thyromegaly. Lungs: good air entry, clear to auscultation bilaterally. Trachea at the midline. Cardiovascular system: S1, S2, regular rate and rhythm. No murmurs, gallops or rubs. No jvd. Carotid upstroke 2 + bilaterally. Bell in place. Abdomen: soft, non tender, non distended. Positive bowel sounds. No hepatosplenomegaly. No abdominal bruits. Extremities: no clubbing, cyanosis or edema. Strong dorsalis pedis pulses. Brisk capillary refill on the toes bilaterally. Integumentary: skin is grossly intact. Neurologic: Alert, didn't follow commands.      Data Review:    Labs: Results:       Chemistry Recent Labs      07/17/17   0350  07/16/17   2350  07/16/17   2145   07/16/17   0848  07/16/17   0555  07/15/17   1518   GLU  203*   --   140*   --   99  97  100*   NA  146*   --   144   --   144  144  152*   K  5.3  4.9  5.6*   --   6.0*  6.2*  3.6   CL  113*   --   110*   --   110*  112*  130*   CO2 20*   --   23   --   20*  18*  12*   BUN  83*   --   83*   --   85*  91*  52*   CREA  4.68*   --   4.55*   --   4.68*  4.75*  2.41*   CA  9.5   --   9.8   --   9.5  9.7  <5.0*   AGAP  13   --   11   --   14  14  10   BUCR  18   --   18   --   18  19  22*   AP  191*   --    --    --    --   160*  77   TP  8.7*   --    --    --    --   8.3*  3.9*   ALB  2.3*   --    --    --    --   2.4*  1.1*   GLOB  6.4*   --    --    --    --   5.9*  2.8   AGRAT  0.4*   --    --    --    --   0.4*  0.4*   PHOS   --   4.6  4.2   < >   --    --    --     < > = values in this interval not displayed. CBC w/Diff Recent Labs      07/17/17   0350  07/16/17   2145  07/16/17   1730   07/16/17   0848  07/16/17   0555   WBC  13.6*  14.4*   --    --   14.4*  14.5*   RBC  5.87*  5.54*   --    --   5.48  5.47   HGB  14.8  14.4  14.6   < >  14.1  13.9   HCT  48.1*  45.4  46.9   < >  45.0  44.9   PLT  304  309   --    --   289  280   GRANS  88*   --    --    --   87*  86*   LYMPH  9*   --    --    --   8*  5*   EOS  0   --    --    --   1  1    < > = values in this interval not displayed. Iron/Ferritin Recent Labs      07/17/17   0350   IRON  15*      PTH/VIT D No results for input(s): PTH in the last 72 hours.     No lab exists for component: VITD           Mary Lou Nagy M.D  [facility]  Office [phone]  Pager [phone]    July 17, 2017

## 2017-07-17 NOTE — PROGRESS NOTES
RRT called - pt tachycardic & tachypneic   Nurse mentioned that pt has abnormal labs form this Am - K+ 6.2 , creatinine worse   Pt seen & examined , appears to be SOB - has basal crackles   IV Lasix 20mg x1 given   Will repeat stat labs  H&H 14 -- he came in with H&H 6.2 -- was given 2 units of blood only so this appears as an over correction  Creatinine worse 4.5 -- came in with 2.4 -- has received fluids & 2 units blood so this doesn't make a lot of sense -- will consult Nephrology in Am - Per chart review from consulate - he has a h/o CKD but dont know his baseline   Pt to be transferred to ICU

## 2017-07-17 NOTE — PROGRESS NOTES
Internal Medicine Progress Note    Patient's Name: Aguilar Soni Date: 7/15/2017  Length of Stay: 2      Assessment/Plan     Active Hospital Problems    Diagnosis Date Noted    Lower GI bleed 07/15/2017    Altered mental status 07/15/2017    CKD (chronic kidney disease) stage 3, GFR 30-59 ml/min 07/15/2017    Urinary tract infection without hematuria 07/15/2017   Staph bacteremia    - Plan was for EGD today w/ push enteroscopy by GI  - Trend H&H  - Consult nephro  - Trend BMP  - Urine cx w/ ecoli sensitive to rocephin  - Repeat blood cx  - Add vanco  - Check echo given possible CHF overnight  - Cont acceptable home medications for chronic conditions   - DVT protocol    I have personally reviewed all pertinent labs and films that have officially resulted over the last 24 hours. I have personally checked for all pending labs that are awaiting final results.     Subjective     Pt s/e @ bedside  RRT overnight for tachycardic and tachypnea and was given IV lasix  Appears to be better this AM  Pt offers no complaints this AM other than mild abd pain with nodding head to questions     Objective     Visit Vitals    /56    Pulse (!) 104    Temp 98.1 °F (36.7 °C)    Resp 30    Ht 6' (1.829 m)    Wt 86.9 kg (191 lb 9.6 oz)    SpO2 97%    BMI 25.99 kg/m2       Physical Exam:  General Appearance: NAD, deaf/mute but understands questions  HENT: normocephalic/atraumatic, moist mucus membranes  Neck: No JVD, supple  Lungs: CTA with normal respiratory effort  CV: Tachycardic w/ RR, no m/r/g  Abdomen: soft, mild TTP, normal bowel sounds  Extremities: no cyanosis, no peripheral edema  Neuro: No focal deficits, motor/sensory intact  Skin: Normal color, intact  Lymphatics: No cervical or supraclavicular lymphadenopathy  Psych: appropriate affect, nods head to questions    Intake and Output:  Current Shift:  07/17 0701 - 07/17 1900  In: 100 [I.V.:100]  Out: 204 [Urine:204]  Last three shifts:  07/15 1901 - 07/17 0700  In: 651.3 [P.O.:540; I.V.:111.3]  Out: 3030 [Urine:3030]    Lab/Data Reviewed:  BMP:   Lab Results   Component Value Date/Time     (H) 07/17/2017 03:50 AM    K 5.3 07/17/2017 03:50 AM     (H) 07/17/2017 03:50 AM    CO2 20 (L) 07/17/2017 03:50 AM    AGAP 13 07/17/2017 03:50 AM     (H) 07/17/2017 03:50 AM    BUN 83 (H) 07/17/2017 03:50 AM    CREA 4.68 (H) 07/17/2017 03:50 AM    GFRAA 15 (L) 07/17/2017 03:50 AM    GFRNA 12 (L) 07/17/2017 03:50 AM     CBC:   Lab Results   Component Value Date/Time    WBC 13.6 (H) 07/17/2017 03:50 AM    HGB 14.8 07/17/2017 03:50 AM    HCT 48.1 (H) 07/17/2017 03:50 AM     07/17/2017 03:50 AM       Imaging Reviewed:  Elzbieta Foster Abd Port  1 V    Result Date: 7/17/2017  EXAM: KUB INDICATION: Abdominal pain COMPARISON: Acute abdominal series 10/4/2006 _______________ FINDINGS: Portable supine abdominal film was performed. There is moderate gas throughout the colon. There is some small bowel gas but no abnormally dilated loops of bowel. No obvious free air in the supine film. No abnormal soft tissue masses or calcifications. Degenerative changes lumbar spine. _______________     IMPRESSION: 1. Nonobstructive bowel gas pattern As the attending radiologist I have personally reviewed the study and preliminary report, and concur with the preliminary findings.        Medications Reviewed:  Current Facility-Administered Medications   Medication Dose Route Frequency    glucose chewable tablet 16 g  4 Tab Oral PRN    glucagon (GLUCAGEN) injection 1 mg  1 mg IntraMUSCular PRN    dextrose (D50W) injection syrg 12.5-25 g  25-50 mL IntraVENous PRN    insulin lispro (HUMALOG) injection   SubCUTAneous Q6H    cefTRIAXone (ROCEPHIN) 1 g in 0.9% sodium chloride (MBP/ADV) 50 mL MBP  1 g IntraVENous Q24H    acetaminophen (TYLENOL) tablet 650 mg  650 mg Oral Q4H PRN    dilTIAZem (CARDIZEM) 100 mg in 0.9% sodium chloride (MBP/ADV) 100 mL infusion  0-15 mg/hr IntraVENous TITRATE  sodium chloride (NS) flush 5-10 mL  5-10 mL IntraVENous PRN    0.9% sodium chloride infusion 250 mL  250 mL IntraVENous PRN    allopurinol (ZYLOPRIM) tablet 200 mg  200 mg Oral EVERY OTHER DAY    amLODIPine (NORVASC) tablet 5 mg  5 mg Oral DAILY    atorvastatin (LIPITOR) tablet 10 mg  10 mg Oral DAILY    latanoprost (XALATAN) 0.005 % ophthalmic solution 1 Drop  1 Drop Both Eyes QHS    nitroglycerin (NITRODUR) 0.4 mg/hr patch 1 Patch  1 Patch TransDERmal DAILY    brimonidine (ALPHAGAN) 0.2 % ophthalmic solution 1 Drop  1 Drop Both Eyes BID    dorzolamide-timolol (COSOPT) 22.3-6.8 mg/mL ophthalmic solution 1 Drop  1 Drop Both Eyes BID    senna (SENOKOT) tablet 8.6 mg  1 Tab Oral BID    sodium chloride (NS) flush 5-10 mL  5-10 mL IntraVENous Q8H    sodium chloride (NS) flush 5-10 mL  5-10 mL IntraVENous PRN    pantoprazole (PROTONIX) 40 mg in sodium chloride 0.9 % 10 mL injection  40 mg IntraVENous Q12H    ondansetron (ZOFRAN) injection 4 mg  4 mg IntraVENous Q6H PRN    0.9% sodium chloride infusion  75 mL/hr IntraVENous PRN           Josee West DO  Internal Medicine, Hospitalist  Pager: [phone]  [address] [place] [address]

## 2017-07-17 NOTE — DIABETES MGMT
NUTRITIONAL ASSESSMENT GLYCEMIC CONTROL/ PLAN OF CARE     Vee Hardy           67 y.o.           7/15/2017                 1. Gastrointestinal hemorrhage, unspecified gastrointestinal hemorrhage type    2. Febrile illness, acute    3. Acute UTI    4. Low hemoglobin       INTERVENTIONS/PLAN:     Monitor diet advancement, labs, glycemic control and weights. ASSESSMENT:   Nutritional Status:  Pt is 108% ideal wt; BMI (calculated): 26.0 kg/m2 (overweight classification but acceptable BMI in pt who is >65 years). Pt appears slender but well nourished. Noted 6.1 kg weight loss since last admission 2/2017;  weight has decreased by 7% in 5 months, not considered to be significant. Pt currently NPO for EGD. Diabetes Management:   PT with good glycemic control PTA. Current BG appears overall well controlled with corrective lispro. Recent blood glucose:   Recent Glucose Results:   Lab Results   Component Value Date/Time     (H) 07/17/2017 03:50 AM     (H) 07/16/2017 09:45 PM    GLUCPOC 140 (H) 07/17/2017 12:09 PM    GLUCPOC 176 (H) 07/17/2017 06:08 AM    GLUCPOC 200 (H) 07/17/2017 02:54 AM       Within target range (non-ICU: <140; ICU<180):  varied [] Yes   []  No    Current Insulin regimen:   Corrective lispro q 6 hours, very insulin resistant dosing   Home medication/insulin regimen: per PTA list-  Lantus 20 units BID  Corrective aspart BID  HbA1c: 6.5% - ave BG has been ~ 135 mg/dL over past 3 months. Adequate glycemic control PTA:  [x] Yes  [] No       SUBJECTIVE/OBJECTIVE:   Information obtained from: chart review, ICU rounds; Pt sleeping soundly at rounds and did not disturb. No recent NH records seen in chart. Pt known from previous admission 2-2017. 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 Pt currently admitted for AMS, work up for GIB, MARYANN and PMHx including CKD stage 3, T2DM    Diet: NPO - having EGD today    No data found.         Medications: [x]                Reviewed   Pertinent:  Lipitor    Most Recent POC Glucose:   Recent Labs      07/17/17   0350  07/16/17   2145  07/16/17   0848  07/16/17   0555   GLU  203*  140*  99  97         Labs:   Lab Results   Component Value Date/Time    Hemoglobin A1c 6.5 07/17/2017 03:50 AM     Lab Results   Component Value Date/Time    Hemoglobin A1c 6.5 07/17/2017 03:50 AM    Hemoglobin A1c 7.8 02/15/2017 06:15 PM    Hemoglobin A1c 5.3 03/15/2016 09:50 AM     Lab Results   Component Value Date/Time    Sodium 146 07/17/2017 03:50 AM    Potassium 5.3 07/17/2017 03:50 AM    Chloride 113 07/17/2017 03:50 AM    CO2 20 07/17/2017 03:50 AM    Anion gap 13 07/17/2017 03:50 AM    Glucose 203 07/17/2017 03:50 AM    BUN 83 07/17/2017 03:50 AM    Creatinine 4.68 07/17/2017 03:50 AM    Calcium 9.5 07/17/2017 03:50 AM    Magnesium 2.5 07/16/2017 11:50 PM    Phosphorus 4.6 07/16/2017 11:50 PM    Albumin 2.3 07/17/2017 03:50 AM       Anthropometrics: IBW : 80.7 kg (178 lb), % IBW (Calculated): 107.64 %, BMI (calculated): 26  Wt Readings from Last 1 Encounters:   07/16/17 86.9 kg (191 lb 9.6 oz)      Wt Readings from Last 3 Encounters:   07/16/17 86.9 kg (191 lb 9.6 oz)   02/17/17 93 kg (205 lb 1.6 oz)   03/15/16 101.2 kg (223 lb)     Ht Readings from Last 1 Encounters:   07/16/17 6' (1.829 m)       Estimated Nutrition Needs:  2155 Kcals/day, Protein (g): 87 g Fluid (ml): 2200 ml  Based on:   [x]          Actual BW    []          ABW   []            Adjusted BW        Nutrition Diagnoses:   Inadequate oral food and beverage intake due to EGD as evidenced by NPO orders. Altered nutrition related labs due to diabetes as evidenced by A1C of 6.5%. Nutrition Interventions:  None at this time - NPO  Goal:   Blood glucose will be within target range of  mg/dL by 7/20/17. Provision of adequate nutrition by 7/22/17. Weight maintenance (+/- 1-2 kg) by 7/27/17.           Nutrition Monitoring and Evaluation      []     Monitor po intake on meal rounds  [x]     Continue inpatient monitoring and intervention  []     Other:      Nutrition Risk:  [x]   High     []  Moderate    []  Minimal/Uncompromised    Katalina Bhardwaj, [address] [place] [address], [facility]   [place]  [address] [place] [address] Pager:  [phone]

## 2017-07-17 NOTE — PROGRESS NOTES
Gastrointestinal Progress Note    Patient Name: Jeanie Serrano    Today's Date: 7/17/2017    Admit Date: 7/15/2017    Impression:  -Fecal occult positive stool  -Anemia, Hb 14 after PRBC transfusion  -Mental status changes, resolved  -Abdominal discomfort; minimal bowel sounds, need to r/o developing pseudoobstruction  -NEPTALI on CKD 3  -Tachycardia  -Tachypnea    Recommendations:  -EGD on hold for today  -Repeat KUB today  -Recheck liver enzymes tomorrow and consider RUQ US if rising  -NPO after midnight, possible EGD with enteroscopy tomorrow      Subjective:     Jeanie Serrano is a 67 y.o. Male followed for fecal occult positive stool. Had RRT call overnight for tachyardia and tachypnea; improved with IV lasix though persistent tachycardia. Apparent abd discomfort with exam. KUB last night showed nonspecific bowel gas pattern without obstruction.  Had bowel movement overnight and smear this AM       Current Facility-Administered Medications   Medication Dose Route Frequency    glucose chewable tablet 16 g  4 Tab Oral PRN    glucagon (GLUCAGEN) injection 1 mg  1 mg IntraMUSCular PRN    dextrose (D50W) injection syrg 12.5-25 g  25-50 mL IntraVENous PRN    insulin lispro (HUMALOG) injection   SubCUTAneous Q6H    cefTRIAXone (ROCEPHIN) 1 g in 0.9% sodium chloride (MBP/ADV) 50 mL MBP  1 g IntraVENous Q24H    acetaminophen (TYLENOL) tablet 650 mg  650 mg Oral Q4H PRN    dilTIAZem (CARDIZEM) 100 mg in 0.9% sodium chloride (MBP/ADV) 100 mL infusion  0-15 mg/hr IntraVENous TITRATE    sodium chloride (NS) flush 5-10 mL  5-10 mL IntraVENous PRN    0.9% sodium chloride infusion 250 mL  250 mL IntraVENous PRN    allopurinol (ZYLOPRIM) tablet 200 mg  200 mg Oral EVERY OTHER DAY    amLODIPine (NORVASC) tablet 5 mg  5 mg Oral DAILY    atorvastatin (LIPITOR) tablet 10 mg  10 mg Oral DAILY    latanoprost (XALATAN) 0.005 % ophthalmic solution 1 Drop  1 Drop Both Eyes QHS    nitroglycerin (NITRODUR) 0.4 mg/hr patch 1 Patch  1 Patch TransDERmal DAILY    brimonidine (ALPHAGAN) 0.2 % ophthalmic solution 1 Drop  1 Drop Both Eyes BID    dorzolamide-timolol (COSOPT) 22.3-6.8 mg/mL ophthalmic solution 1 Drop  1 Drop Both Eyes BID    senna (SENOKOT) tablet 8.6 mg  1 Tab Oral BID    sodium chloride (NS) flush 5-10 mL  5-10 mL IntraVENous Q8H    sodium chloride (NS) flush 5-10 mL  5-10 mL IntraVENous PRN    pantoprazole (PROTONIX) 40 mg in sodium chloride 0.9 % 10 mL injection  40 mg IntraVENous Q12H    ondansetron (ZOFRAN) injection 4 mg  4 mg IntraVENous Q6H PRN    0.9% sodium chloride infusion  75 mL/hr IntraVENous PRN          Objective:     Physical Exam:    Visit Vitals    /44    Pulse (!) 106    Temp 98.1 °F (36.7 °C)    Resp 29    Ht 6' (1.829 m)    Wt 86.9 kg (191 lb 9.6 oz)    SpO2 96%    BMI 25.99 kg/m2     Gen: Awake and alert; nods head to me speaking  CV: RRR, tachycardic, no murmur  Pulm: CTA B/L  Abd: Soft, mildly distended and tympanic; somewhat high pitched bowel sounds, mild grimacing with palpation.    Extr: no edema    Data Review:    Labs: Results:       Chemistry Recent Labs      07/17/17   0350  07/16/17   2350  07/16/17   2145  07/16/17   0848  07/16/17   0555  07/15/17   1518   GLU  203*   --   140*  99  97  100*   NA  146*   --   144  144  144  152*   K  5.3  4.9  5.6*  6.0*  6.2*  3.6   CL  113*   --   110*  110*  112*  130*   CO2  20*   --   23  20*  18*  12*   BUN  83*   --   83*  85*  91*  52*   CREA  4.68*   --   4.55*  4.68*  4.75*  2.41*   CA  9.5   --   9.8  9.5  9.7  <5.0*   AGAP  13   --   11  14  14  10   BUCR  18   --   18  18  19  22*   AP  191*   --    --    --   160*  77   TP  8.7*   --    --    --   8.3*  3.9*   ALB  2.3*   --    --    --   2.4*  1.1*   GLOB  6.4*   --    --    --   5.9*  2.8   AGRAT  0.4*   --    --    --   0.4*  0.4*      CBC w/Diff Recent Labs      07/17/17   0350  07/16/17   2145  07/16/17   1730   07/16/17   0848  07/16/17   0555   WBC  13.6*  14.4* --    --   14.4*  14.5*   RBC  5.87*  5.54*   --    --   5.48  5.47   HGB  14.8  14.4  14.6   < >  14.1  13.9   HCT  48.1*  45.4  46.9   < >  45.0  44.9   PLT  304  309   --    --   289  280   GRANS  88*   --    --    --   87*  86*   LYMPH  9*   --    --    --   8*  5*   EOS  0   --    --    --   1  1    < > = values in this interval not displayed. Coagulation Recent Labs      07/17/17   0350  07/16/17   0848   07/15/17   1518   PTP  17.1*  16.0*   < >  23.9*   INR  1.5*  1.3*   < >  2.3*   APTT   --    --    --   40.8*    < > = values in this interval not displayed.        Liver Enzymes Recent Labs      07/17/17   0350   TP  8.7*   ALB  2.3*   AP  191*   SGOT  46*   ALT  19            Cesar Singleton MD  July 17, 2017

## 2017-07-17 NOTE — PROGRESS NOTES
Physical Exam   Musculoskeletal:        Feet:    Skin:             Right foot: black dot sole right foot under big toe, and look like callus on distalr side of sole. Left foot: Diabetic ulcer wound and callus on distalr side of sole.      Skin exam provided with BENJAMIN Shea

## 2017-07-17 NOTE — PROGRESS NOTES
2134-RRT called for patient with respiratory distress, abnormal labs, elevations in HR up to 160s, pt received medications per [facility] for potassium elevations, calcium deficit and elevations in temp  2210-EKG complete, per Dr Bin Jaquez no need for sepsis workup  2217-TO ICU, report to [facility]

## 2017-07-17 NOTE — PROGRESS NOTES
Called to patient's room for rapid response - patient's potassium was 6 - ABG obtained - 10cc albuterol given via mask and oxygen

## 2017-07-17 NOTE — PROGRESS NOTES
2230> Received patient from 3 S. Assumed patient care. Patient assessed. Patient is lying in bed, HOB 30. Patient is alert, follows some commands. Patient has distended, tympanic, soft abdomen, hyperactive BS. Abdomen tender on touch. Patient tachypneic, shallow breathing. No further changes or complaints. Call bell within reach. Bed is locked in low position. Side rails up x 3. Will continue to monitor.  2300> Bell inserted according to order. 0700> Bedside and Verbal shift change report given to BENJAMIN Santos (oncoming nurse) by Michael Solis RN (offgoing nurse). Report included the following information SBAR, Kardex, Intake/Output, MAR and Recent Results.

## 2017-07-18 ENCOUNTER — APPOINTMENT (OUTPATIENT)
Dept: GENERAL RADIOLOGY | Age: 72
DRG: 377 | End: 2017-07-18
Attending: HOSPITALIST
Payer: MEDICARE

## 2017-07-18 ENCOUNTER — APPOINTMENT (OUTPATIENT)
Dept: CT IMAGING | Age: 72
DRG: 377 | End: 2017-07-18
Attending: INTERNAL MEDICINE
Payer: MEDICARE

## 2017-07-18 PROBLEM — I50.22 SYSTOLIC CHF, CHRONIC (HCC): Status: ACTIVE | Noted: 2017-07-18

## 2017-07-18 LAB
ALBUMIN SERPL BCP-MCNC: 2 G/DL (ref 3.4–5)
ALBUMIN/GLOB SERPL: 0.3 {RATIO} (ref 0.8–1.7)
ALP SERPL-CCNC: 169 U/L (ref 45–117)
ALT SERPL-CCNC: 16 U/L (ref 16–61)
ANION GAP BLD CALC-SCNC: 12 MMOL/L (ref 3–18)
ANION GAP BLD CALC-SCNC: 13 MMOL/L (ref 3–18)
AST SERPL W P-5'-P-CCNC: 31 U/L (ref 15–37)
BACTERIA SPEC CULT: ABNORMAL
BASOPHILS # BLD AUTO: 0 K/UL (ref 0–0.06)
BASOPHILS # BLD AUTO: 0 K/UL (ref 0–0.06)
BASOPHILS # BLD: 0 % (ref 0–2)
BASOPHILS # BLD: 0 % (ref 0–2)
BILIRUB SERPL-MCNC: 1 MG/DL (ref 0.2–1)
BUN SERPL-MCNC: 95 MG/DL (ref 7–18)
BUN SERPL-MCNC: 95 MG/DL (ref 7–18)
BUN/CREAT SERPL: 20 (ref 12–20)
BUN/CREAT SERPL: 21 (ref 12–20)
CALCIUM SERPL-MCNC: 8.9 MG/DL (ref 8.5–10.1)
CALCIUM SERPL-MCNC: 9.4 MG/DL (ref 8.5–10.1)
CHLORIDE SERPL-SCNC: 115 MMOL/L (ref 100–108)
CHLORIDE SERPL-SCNC: 116 MMOL/L (ref 100–108)
CO2 SERPL-SCNC: 21 MMOL/L (ref 21–32)
CO2 SERPL-SCNC: 21 MMOL/L (ref 21–32)
CREAT SERPL-MCNC: 4.46 MG/DL (ref 0.6–1.3)
CREAT SERPL-MCNC: 4.7 MG/DL (ref 0.6–1.3)
DIFFERENTIAL METHOD BLD: ABNORMAL
DIFFERENTIAL METHOD BLD: ABNORMAL
EOSINOPHIL # BLD: 0.2 K/UL (ref 0–0.4)
EOSINOPHIL # BLD: 0.4 K/UL (ref 0–0.4)
EOSINOPHIL NFR BLD: 1 % (ref 0–5)
EOSINOPHIL NFR BLD: 2 % (ref 0–5)
ERYTHROCYTE [DISTWIDTH] IN BLOOD BY AUTOMATED COUNT: 18 % (ref 11.6–14.5)
ERYTHROCYTE [DISTWIDTH] IN BLOOD BY AUTOMATED COUNT: 18.3 % (ref 11.6–14.5)
GLOBULIN SER CALC-MCNC: 6.2 G/DL (ref 2–4)
GLUCOSE BLD STRIP.AUTO-MCNC: 117 MG/DL (ref 70–110)
GLUCOSE BLD STRIP.AUTO-MCNC: 120 MG/DL (ref 70–110)
GLUCOSE BLD STRIP.AUTO-MCNC: 130 MG/DL (ref 70–110)
GLUCOSE BLD STRIP.AUTO-MCNC: 155 MG/DL (ref 70–110)
GLUCOSE SERPL-MCNC: 133 MG/DL (ref 74–99)
GLUCOSE SERPL-MCNC: 161 MG/DL (ref 74–99)
GRAM STN SPEC: ABNORMAL
HCT VFR BLD AUTO: 42 % (ref 36–48)
HCT VFR BLD AUTO: 42.5 % (ref 36–48)
HGB BLD-MCNC: 12.9 G/DL (ref 13–16)
HGB BLD-MCNC: 13.2 G/DL (ref 13–16)
LYMPHOCYTES # BLD AUTO: 5 % (ref 21–52)
LYMPHOCYTES # BLD AUTO: 8 % (ref 21–52)
LYMPHOCYTES # BLD: 0.7 K/UL (ref 0.9–3.6)
LYMPHOCYTES # BLD: 1.3 K/UL (ref 0.9–3.6)
MCH RBC QN AUTO: 25.5 PG (ref 24–34)
MCH RBC QN AUTO: 25.7 PG (ref 24–34)
MCHC RBC AUTO-ENTMCNC: 30.7 G/DL (ref 31–37)
MCHC RBC AUTO-ENTMCNC: 31.1 G/DL (ref 31–37)
MCV RBC AUTO: 82.7 FL (ref 74–97)
MCV RBC AUTO: 83 FL (ref 74–97)
MONOCYTES # BLD: 0.5 K/UL (ref 0.05–1.2)
MONOCYTES # BLD: 1.5 K/UL (ref 0.05–1.2)
MONOCYTES NFR BLD AUTO: 3 % (ref 3–10)
MONOCYTES NFR BLD AUTO: 9 % (ref 3–10)
NEUTS SEG # BLD: 13.3 K/UL (ref 1.8–8)
NEUTS SEG # BLD: 14.4 K/UL (ref 1.8–8)
NEUTS SEG NFR BLD AUTO: 85 % (ref 40–73)
NEUTS SEG NFR BLD AUTO: 87 % (ref 40–73)
PLATELET # BLD AUTO: 277 K/UL (ref 135–420)
PLATELET # BLD AUTO: 283 K/UL (ref 135–420)
PMV BLD AUTO: 9.8 FL (ref 9.2–11.8)
PMV BLD AUTO: 9.9 FL (ref 9.2–11.8)
POTASSIUM SERPL-SCNC: 4 MMOL/L (ref 3.5–5.5)
POTASSIUM SERPL-SCNC: 4.1 MMOL/L (ref 3.5–5.5)
PROT SERPL-MCNC: 8.2 G/DL (ref 6.4–8.2)
RBC # BLD AUTO: 5.06 M/UL (ref 4.7–5.5)
RBC # BLD AUTO: 5.14 M/UL (ref 4.7–5.5)
SERVICE CMNT-IMP: ABNORMAL
SERVICE CMNT-IMP: ABNORMAL
SODIUM SERPL-SCNC: 149 MMOL/L (ref 136–145)
SODIUM SERPL-SCNC: 149 MMOL/L (ref 136–145)
WBC # BLD AUTO: 15.7 K/UL (ref 4.6–13.2)
WBC # BLD AUTO: 16.6 K/UL (ref 4.6–13.2)

## 2017-07-18 PROCEDURE — 74011000250 HC RX REV CODE- 250: Performed by: INTERNAL MEDICINE

## 2017-07-18 PROCEDURE — 77030020250 HC SOL INJ D 5% LFCR 1000ML BG LF

## 2017-07-18 PROCEDURE — 80048 BASIC METABOLIC PNL TOTAL CA: CPT | Performed by: HOSPITALIST

## 2017-07-18 PROCEDURE — 36415 COLL VENOUS BLD VENIPUNCTURE: CPT | Performed by: INTERNAL MEDICINE

## 2017-07-18 PROCEDURE — 74011000258 HC RX REV CODE- 258: Performed by: HOSPITALIST

## 2017-07-18 PROCEDURE — 77030018836 HC SOL IRR NACL ICUM -A

## 2017-07-18 PROCEDURE — 85025 COMPLETE CBC W/AUTO DIFF WBC: CPT | Performed by: INTERNAL MEDICINE

## 2017-07-18 PROCEDURE — 74011250636 HC RX REV CODE- 250/636: Performed by: INTERNAL MEDICINE

## 2017-07-18 PROCEDURE — 74011250636 HC RX REV CODE- 250/636: Performed by: HOSPITALIST

## 2017-07-18 PROCEDURE — 80053 COMPREHEN METABOLIC PANEL: CPT | Performed by: INTERNAL MEDICINE

## 2017-07-18 PROCEDURE — 74011000250 HC RX REV CODE- 250: Performed by: HOSPITALIST

## 2017-07-18 PROCEDURE — 93005 ELECTROCARDIOGRAM TRACING: CPT

## 2017-07-18 PROCEDURE — 65610000006 HC RM INTENSIVE CARE

## 2017-07-18 PROCEDURE — 87186 SC STD MICRODIL/AGAR DIL: CPT | Performed by: HOSPITALIST

## 2017-07-18 PROCEDURE — 77030008771 HC TU NG SALEM SUMP -A

## 2017-07-18 PROCEDURE — C9113 INJ PANTOPRAZOLE SODIUM, VIA: HCPCS | Performed by: INTERNAL MEDICINE

## 2017-07-18 PROCEDURE — 87040 BLOOD CULTURE FOR BACTERIA: CPT | Performed by: HOSPITALIST

## 2017-07-18 PROCEDURE — 74011250637 HC RX REV CODE- 250/637: Performed by: HOSPITALIST

## 2017-07-18 PROCEDURE — 87077 CULTURE AEROBIC IDENTIFY: CPT | Performed by: HOSPITALIST

## 2017-07-18 PROCEDURE — 77030011256 HC DRSG MEPILEX <16IN NO BORD MOLN -A

## 2017-07-18 PROCEDURE — 74176 CT ABD & PELVIS W/O CONTRAST: CPT

## 2017-07-18 PROCEDURE — 77030020291 HC FLEXSEAL FMS BMS -C

## 2017-07-18 PROCEDURE — 74011636637 HC RX REV CODE- 636/637: Performed by: HOSPITALIST

## 2017-07-18 PROCEDURE — 74000 XR ABD PORT  1 V: CPT

## 2017-07-18 PROCEDURE — 0D9670Z DRAINAGE OF STOMACH WITH DRAINAGE DEVICE, VIA NATURAL OR ARTIFICIAL OPENING: ICD-10-PCS | Performed by: INTERNAL MEDICINE

## 2017-07-18 RX ORDER — LEVOFLOXACIN 5 MG/ML
750 INJECTION, SOLUTION INTRAVENOUS ONCE
Status: COMPLETED | OUTPATIENT
Start: 2017-07-18 | End: 2017-07-23

## 2017-07-18 RX ORDER — DEXTROSE MONOHYDRATE 50 MG/ML
50 INJECTION, SOLUTION INTRAVENOUS CONTINUOUS
Status: DISCONTINUED | OUTPATIENT
Start: 2017-07-18 | End: 2017-07-21

## 2017-07-18 RX ORDER — LEVOFLOXACIN 5 MG/ML
500 INJECTION, SOLUTION INTRAVENOUS
Status: DISCONTINUED | OUTPATIENT
Start: 2017-07-20 | End: 2017-07-20

## 2017-07-18 RX ORDER — LEVOFLOXACIN 5 MG/ML
750 INJECTION, SOLUTION INTRAVENOUS EVERY 24 HOURS
Status: DISCONTINUED | OUTPATIENT
Start: 2017-07-18 | End: 2017-07-18 | Stop reason: DRUGHIGH

## 2017-07-18 RX ORDER — ACETAMINOPHEN 325 MG/1
650 TABLET ORAL
Status: DISCONTINUED | OUTPATIENT
Start: 2017-07-18 | End: 2017-07-28 | Stop reason: HOSPADM

## 2017-07-18 RX ADMIN — LATANOPROST 1 DROP: 50 SOLUTION OPHTHALMIC at 21:41

## 2017-07-18 RX ADMIN — DEXTROSE MONOHYDRATE 75 ML/HR: 5 INJECTION, SOLUTION INTRAVENOUS at 23:49

## 2017-07-18 RX ADMIN — Medication 10 ML: at 14:08

## 2017-07-18 RX ADMIN — BRIMONIDINE TARTRATE 1 DROP: 2 SOLUTION OPHTHALMIC at 08:44

## 2017-07-18 RX ADMIN — SODIUM CHLORIDE 40 MG: 9 INJECTION INTRAMUSCULAR; INTRAVENOUS; SUBCUTANEOUS at 08:36

## 2017-07-18 RX ADMIN — INSULIN LISPRO 2 UNITS: 100 INJECTION, SOLUTION INTRAVENOUS; SUBCUTANEOUS at 14:06

## 2017-07-18 RX ADMIN — DEXTROSE MONOHYDRATE 75 ML/HR: 5 INJECTION, SOLUTION INTRAVENOUS at 08:39

## 2017-07-18 RX ADMIN — Medication 10 ML: at 22:45

## 2017-07-18 RX ADMIN — BRIMONIDINE TARTRATE 1 DROP: 2 SOLUTION OPHTHALMIC at 18:23

## 2017-07-18 RX ADMIN — DORZOLAMIDE HYDROCHLORIDE AND TIMOLOL MALEATE 1 DROP: 20; 5 SOLUTION/ DROPS OPHTHALMIC at 18:23

## 2017-07-18 RX ADMIN — SENNOSIDES 8.6 MG: 8.6 TABLET, FILM COATED ORAL at 08:36

## 2017-07-18 RX ADMIN — SODIUM CHLORIDE 1000 MG: 900 INJECTION, SOLUTION INTRAVENOUS at 00:02

## 2017-07-18 RX ADMIN — SODIUM CHLORIDE 12.5 MG/HR: 900 INJECTION, SOLUTION INTRAVENOUS at 03:52

## 2017-07-18 RX ADMIN — LEVOFLOXACIN 750 MG: 5 INJECTION, SOLUTION INTRAVENOUS at 14:07

## 2017-07-18 RX ADMIN — SODIUM CHLORIDE 12.5 MG/HR: 900 INJECTION, SOLUTION INTRAVENOUS at 00:29

## 2017-07-18 RX ADMIN — DORZOLAMIDE HYDROCHLORIDE AND TIMOLOL MALEATE 1 DROP: 20; 5 SOLUTION/ DROPS OPHTHALMIC at 08:44

## 2017-07-18 RX ADMIN — Medication 10 ML: at 05:52

## 2017-07-18 NOTE — PROGRESS NOTES
0720: assumed care of patient resting in bed. Report received from Bakari Elizabeth 930. Vital signs stable at this time. Patient continues on cardizem drip and 4 liters NC. No s/s of distress noted at this time. Call bell in reach,side rails x3 for patient safety. Will continue to monitor. 3936: Dr. Caryle Bellow at bedside, okay to give PRN tylenol for fever and mild pain. 1503: Dr. Gemini Montana paged in regards to suction via NG tube. Awaiting call back. Will continue to monitor. 1718: critical lab results: gram + cocci in clusters in 1 of 2 pediatric tubes. Dr. Caryle Bellow paged at this time. 1719: Dr. Gemini Montana at bedside, ordered for low intermittent suction via NG tube. Will continue to monitor. 1749: Received call back from Dr. Caryle Bellow, made aware of critical lab results. Received new orders to 2D echo and to repeat 2 sets of blood cultures    1914: critical lab results: gram + cocci in clusters in 2 of 2 pediatric tubes. 1930: Bedside and Verbal shift change report given to Brayden Zeng RN (oncoming nurse) by Brian Barone RN   (offgoing nurse). Report included the following information SBAR, ED Summary, Procedure Summary, MAR, Recent Results, Med Rec Status, Cardiac Rhythm NSR with PVC and Alarm Parameters .

## 2017-07-18 NOTE — PROGRESS NOTES
Gastrointestinal Progress Note    Patient Name: Maris Goyal    Today's Date: 7/18/2017    Admit Date: 7/15/2017    Impression:  -Colonic dilation, most suggestive of colonic pseudoobstruction  -Fecal occult positive stool; no overt bleeding  -Anemia, Hb 14 after PRBC transfusion; stable  -Mental status changes, resolved  -Abdominal discomfort; minimal bowel sounds, need to r/o developing pseudoobstruction  -NEPTALI on CKD 3  -St Aureus bacteremia, Ecoli UTI   -Tachycardia  -Tachypnea     Recommendations:  -Place NGT and rectal tube to gravity  -EGD on hold  -Noncontrast CT scan today  -Avoid narcotics and anticholinergics  -Turn patient in bed to promote colonic air mobilization  -Need to consider neostigmine vs colonic decompression in next 24 hours if no improvement    Subjective:     Maris Goyal is a 67 y.o. Male followed for fecal occult positive stool. KUB yesterday evening shows progressive colonic dilation up to 13cm in the cecum. No other acute ON events.        Current Facility-Administered Medications   Medication Dose Route Frequency    dextrose 5% infusion  75 mL/hr IntraVENous CONTINUOUS    acetaminophen (TYLENOL) tablet 650 mg  650 mg Oral Q4H PRN    glucose chewable tablet 16 g  4 Tab Oral PRN    glucagon (GLUCAGEN) injection 1 mg  1 mg IntraMUSCular PRN    dextrose (D50W) injection syrg 12.5-25 g  25-50 mL IntraVENous PRN    insulin lispro (HUMALOG) injection   SubCUTAneous Q6H    pantoprazole (PROTONIX) 40 mg in sodium chloride 0.9 % 10 mL injection  40 mg IntraVENous DAILY    [START ON 7/19/2017] vancomycin (VANCOCIN) 1,500 mg in 0.9% sodium chloride 500 mL IVPB  1,500 mg IntraVENous Q48H    [START ON 7/23/2017] VANCOMYCIN INFORMATION NOTE   Other ONCE    cefTRIAXone (ROCEPHIN) 1 g in 0.9% sodium chloride (MBP/ADV) 50 mL MBP  1 g IntraVENous Q24H    dilTIAZem (CARDIZEM) 100 mg in 0.9% sodium chloride (MBP/ADV) 100 mL infusion  0-15 mg/hr IntraVENous TITRATE    sodium chloride (NS) flush 5-10 mL  5-10 mL IntraVENous PRN    0.9% sodium chloride infusion 250 mL  250 mL IntraVENous PRN    latanoprost (XALATAN) 0.005 % ophthalmic solution 1 Drop  1 Drop Both Eyes QHS    nitroglycerin (NITRODUR) 0.4 mg/hr patch 1 Patch  1 Patch TransDERmal DAILY    brimonidine (ALPHAGAN) 0.2 % ophthalmic solution 1 Drop  1 Drop Both Eyes BID    dorzolamide-timolol (COSOPT) 22.3-6.8 mg/mL ophthalmic solution 1 Drop  1 Drop Both Eyes BID    senna (SENOKOT) tablet 8.6 mg  1 Tab Oral BID    sodium chloride (NS) flush 5-10 mL  5-10 mL IntraVENous Q8H    sodium chloride (NS) flush 5-10 mL  5-10 mL IntraVENous PRN    ondansetron (ZOFRAN) injection 4 mg  4 mg IntraVENous Q6H PRN    0.9% sodium chloride infusion  75 mL/hr IntraVENous PRN          Objective:     Physical Exam:    Visit Vitals    /61    Pulse 90    Temp 97.2 °F (36.2 °C)    Resp 19    Ht 6' (1.829 m)    Wt 87.3 kg (192 lb 6.4 oz)    SpO2 98%    BMI 26.09 kg/m2     Gen: Awake and alert; nods head to me speaking  CV: RRR, tachycardic, no murmur  Pulm: CTA B/L  Abd: Soft, mildly distended and tympanic; minimal bowel sounds, no clear tenderness on exam today  Extr: no edema    Data Review:    Labs: Results:       Chemistry Recent Labs      07/18/17   0345  07/17/17   0350  07/16/17   2350  07/16/17   2145   07/16/17   0555   GLU  133*  203*   --   140*   < >  97   NA  149*  146*   --   144   < >  144   K  4.1  5.3  4.9  5.6*   < >  6.2*   CL  116*  113*   --   110*   < >  112*   CO2  21  20*   --   23   < >  18*   BUN  95*  83*   --   83*   < >  91*   CREA  4.70*  4.68*   --   4.55*   < >  4.75*   CA  8.9  9.5   --   9.8   < >  9.7   AGAP  12  13   --   11   < >  14   BUCR  20  18   --   18   < >  19   AP  169*  191*   --    --    --   160*   TP  8.2  8.7*   --    --    --   8.3*   ALB  2.0*  2.3*   --    --    --   2.4*   GLOB  6.2*  6.4*   --    --    --   5.9*   AGRAT  0.3*  0.4*   --    --    --   0.4*    < > = values in this interval not displayed. CBC w/Diff Recent Labs      07/18/17   0345  07/17/17   0350  07/16/17   2145   07/16/17   0848   WBC  15.7*  13.6*  14.4*   --   14.4*   RBC  5.14  5.87*  5.54*   --   5.48   HGB  13.2  14.8  14.4   < >  14.1   HCT  42.5  48.1*  45.4   < >  45.0   PLT  277  304  309   --   289   GRANS  85*  88*   --    --   87*   LYMPH  5*  9*   --    --   8*   EOS  1  0   --    --   1    < > = values in this interval not displayed. Coagulation Recent Labs      07/17/17   0350  07/16/17   0848   07/15/17   1518   PTP  17.1*  16.0*   < >  23.9*   INR  1.5*  1.3*   < >  2.3*   APTT   --    --    --   40.8*    < > = values in this interval not displayed.        Liver Enzymes Recent Labs      07/18/17   0345   TP  8.2   ALB  2.0*   AP  169*   SGOT  31   ALT  16        Kodi Arambula MD  July 18, 2017

## 2017-07-18 NOTE — ROUTINE PROCESS
3329- assumed care from [address] [place] [address], Rosemary Tang RN. Bedside/verbal report received. Pt on 4L NC, continued cardizem drip. Will continue to monitor. Shift report:  Able to titrate cardizem drip down to 2.5mg. Urine output increased, sediment present. Frequent PVCs noted  overnight. 0700-Bedside and Verbal shift change report given to Rosemary Tang RN (oncoming nurse) by Aidee Rivera RN   (offgoing nurse). Report included the following information SBAR, ED Summary, OR Summary, Intake/Output and MAR.

## 2017-07-18 NOTE — PROGRESS NOTES
RENAL PROGRESS NOTE        Mary Delgadillo         Assessment:   · NEPTALI on ckd 3. Etio- ischemic atn in a setting of uti/sepsis/gi bleeding. Non oliguric. · CKD 3 due to presumably dm/htn. · Hyperkalemia in context of neptali on ckd 3. Use of aldactone pta contributed to hyperkalemia. · Normal ag met acidosis. Improving. · Hypernatremia  · Staph aureus sepsis. Source? · E. Coli uti. · HTN. · Pseudo-obstruction    Patient with Non-oliguric NEPTALI on CKD 3 in the setting of sepsis and ? GI bleed and colonic pseudo obstruction. UO is over a liter and hemodynamically stable. Creatinine seems to be leveled off. Potassium better  Na worse. H/H stable and has no obvious bleeding. No indication for dialysis. He has Staph in blood and E.coli in urine. Source of Staph unclear.     Consider CT abdomen and Echo. If not considering CT, should have renal ultrasound. Change IVF to D5.                                                                                                                      Subjective:  Patient complaints off: No complaints. No SOB/CP/N/V.       Patient Active Problem List   Diagnosis Code    Acute upper GI bleed K92.2    NEPTALI (acute kidney injury) ([facility] [address] N17.9    Febrile illness, acute R50.9    Lower GI bleed K92.2    Altered mental status R41.82    CKD (chronic kidney disease) stage 3, GFR 30-59 ml/min N18.3    Urinary tract infection without hematuria N39.0       Current Facility-Administered Medications   Medication Dose Route Frequency Provider Last Rate Last Dose    dextrose 5% infusion  75 mL/hr IntraVENous CONTINUOUS Yesica Bautista MD 75 mL/hr at 07/18/17 0839 75 mL/hr at 07/18/17 0839    acetaminophen (TYLENOL) tablet 650 mg  650 mg Oral Q4H PRN Veto Profit, DO        glucose chewable tablet 16 g  4 Tab Oral PRN Veto Profit, DO        glucagon (GLUCAGEN) injection 1 mg  1 mg IntraMUSCular PRN Veto Profit, DO        dextrose (D50W) injection syrg 12.5-25 g  25-50 mL IntraVENous PRN Youlanda Chambers, DO        insulin lispro (HUMALOG) injection   SubCUTAneous Q6H Youlanda Chambers, DO   Stopped at 07/18/17 0000    pantoprazole (PROTONIX) 40 mg in sodium chloride 0.9 % 10 mL injection  40 mg IntraVENous DAILY Daryn Leiva MD   40 mg at 07/18/17 0836    [START ON 7/19/2017] vancomycin (VANCOCIN) 1,500 mg in 0.9% sodium chloride 500 mL IVPB  1,500 mg IntraVENous Q48H Hoa Reyes, DO        [START ON 7/23/2017] VANCOMYCIN INFORMATION NOTE   Other ONCE Youlanda Chambers, DO        cefTRIAXone (ROCEPHIN) 1 g in 0.9% sodium chloride (MBP/ADV) 50 mL MBP  1 g IntraVENous Q24H Alicia Rodriguez  mL/hr at 07/17/17 2156 1 g at 07/17/17 2156    dilTIAZem (CARDIZEM) 100 mg in 0.9% sodium chloride (MBP/ADV) 100 mL infusion  0-15 mg/hr IntraVENous TITRATE Eleazar Mendoza MD 2.5 mL/hr at 07/18/17 0649 2.5 mg/hr at 07/18/17 0649    sodium chloride (NS) flush 5-10 mL  5-10 mL IntraVENous PRN Kelsea Roberson MD        0.9% sodium chloride infusion 250 mL  250 mL IntraVENous PRN Kelsea Roberson MD        latanoprost (XALATAN) 0.005 % ophthalmic solution 1 Drop  1 Drop Both Eyes QHS Eleazar Mendoza MD   1 Drop at 07/17/17 2204    nitroglycerin (NITRODUR) 0.4 mg/hr patch 1 Patch  1 Patch TransDERmal DAILY Eleazar Mendoza MD   1 Patch at 07/18/17 0836    brimonidine (ALPHAGAN) 0.2 % ophthalmic solution 1 Drop  1 Drop Both Eyes BID Eleazar Mendoza MD   1 Drop at 07/18/17 0844    dorzolamide-timolol (COSOPT) 22.3-6.8 mg/mL ophthalmic solution 1 Drop  1 Drop Both Eyes BID Eleazar Mendoza MD   1 Drop at 07/18/17 0844    senna (SENOKOT) tablet 8.6 mg  1 Tab Oral BID Eleazar Mendoza MD   8.6 mg at 07/18/17 0836    sodium chloride (NS) flush 5-10 mL  5-10 mL IntraVENous Q8H Eleazar Mendoza MD   10 mL at 07/18/17 0552    sodium chloride (NS) flush 5-10 mL  5-10 mL IntraVENous PRN Eleazar Mendoza MD        ondansetron [facility] injection 4 mg  4 mg IntraVENous Q6H PRN Mick Cunningham MD        0.9% sodium chloride infusion  75 mL/hr IntraVENous PRN Mick Cunningham MD           Objective  Vitals:    07/18/17 0630 07/18/17 0700 07/18/17 0800 07/18/17 0900   BP: 135/41 150/61 134/62 124/54   Pulse: 90 90 92 93   Resp: 23 19 22 22   Temp:   97.2 °F (36.2 °C)    SpO2: 98% 98% 96% 97%   Weight:       Height:             Intake/Output Summary (Last 24 hours) at 07/18/17 1103  Last data filed at 07/18/17 0900   Gross per 24 hour   Intake          2007.63 ml   Output             1107 ml   Net           900.63 ml           Admission weight: Weight: 89.4 kg (197 lb) (07/15/17 1530)  Last Weight Metrics:  Weight Loss Metrics 7/18/2017 2/17/2017 3/15/2016   Today's Wt 192 lb 6.4 oz 205 lb 1.6 oz 223 lb   BMI 26.09 kg/m2 29.43 kg/m2 30.24 kg/m2             Physical Assessment:     General: NAD, alert and oriented. Neck: No jvd. LUNGS: Clear to Auscultation, No rales, rhonchi or wheezes. CVS EXM: S1, S2  RRR No rub. Abdomen: soft, distended, BS sluggish  Lower Extremities:  no edema. Lab    CBC w/Diff Recent Labs      07/18/17   0345  07/17/17   0350  07/16/17 2145   07/16/17   0848   WBC  15.7*  13.6*  14.4*   --   14.4*   RBC  5.14  5.87*  5.54*   --   5.48   HGB  13.2  14.8  14.4   < >  14.1   HCT  42.5  48.1*  45.4   < >  45.0   PLT  277  304  309   --   289   GRANS  85*  88*   --    --   87*   LYMPH  5*  9*   --    --   8*   EOS  1  0   --    --   1    < > = values in this interval not displayed.         Chemistry Recent Labs      07/18/17   0345  07/17/17   0350  07/16/17   2350  07/16/17   2145   07/16/17   0555   GLU  133*  203*   --   140*   < >  97   NA  149*  146*   --   144   < >  144   K  4.1  5.3  4.9  5.6*   < >  6.2*   CL  116*  113*   --   110*   < >  112*   CO2  21  20*   --   23   < >  18*   BUN  95*  83*   --   83*   < >  91*   CREA  4.70*  4.68*   --   4.55*   < >  4.75*   CA  8.9  9.5   --   9.8   < >  9.7   AGAP  12  13 --   11   < >  14   BUCR  20  18   --   18   < >  19   AP  169*  191*   --    --    --   160*   TP  8.2  8.7*   --    --    --   8.3*   ALB  2.0*  2.3*   --    --    --   2.4*   GLOB  6.2*  6.4*   --    --    --   5.9*   AGRAT  0.3*  0.4*   --    --    --   0.4*   PHOS   --    --   4.6  4.2   < >   --     < > = values in this interval not displayed.          Lab Results   Component Value Date/Time    Iron 15 07/17/2017 03:50 AM    TIBC 181 07/17/2017 03:50 AM    Iron % saturation 8 07/17/2017 03:50 AM    Ferritin 12 03/15/2016 09:50 AM    Lab Results   Component Value Date/Time    Calcium 8.9 07/18/2017 03:45 AM    Phosphorus 4.6 07/16/2017 11:50 PM        Cindy Haynes MD  11:03 AM  7/18/2017

## 2017-07-18 NOTE — PROGRESS NOTES
Internal Medicine Progress Note    Patient's Name: Felicita Ramey Date: 7/15/2017  Length of Stay: 3      Assessment/Plan     Active Hospital Problems    Diagnosis Date Noted    Lower GI bleed 07/15/2017    Altered mental status 07/15/2017    CKD (chronic kidney disease) stage 3, GFR 30-59 ml/min 07/15/2017    Urinary tract infection without hematuria 07/15/2017   Staph bacteremia  Hypernatremia  Colonic pseduoobstruction    - EGD postponed by GI  - Trend H&H  - Cont IVFs w/ D5  - F/u nephro  - Trend BMP  - Urine cx w/ ecoli sensitive to rocephin  - Blood cx consistent w/ staph w/ sensitivity to levaquin  - Will change to levaquin to cover UTI and bacteremia  - Repeat blood cx pending  - Cont vanco w/ pharm to manage levels  - Concern for ogilvy given distension  - NGT and rectal tube to gravity  - CT scan today  - Pk vs colonic decomp in next 24 hrs if no improvement  - Cont acceptable home medications for chronic conditions   - DVT protocol    I have personally reviewed all pertinent labs and films that have officially resulted over the last 24 hours. I have personally checked for all pending labs that are awaiting final results.     Subjective     Pt s/e @ bedside  Feeling better this AM  But still w/ abd dicomfort    Objective     Visit Vitals    /62    Pulse 92    Temp 97.2 °F (36.2 °C)    Resp 21    Ht 6' (1.829 m)    Wt 87.3 kg (192 lb 6.4 oz)    SpO2 96%    BMI 26.09 kg/m2       Physical Exam:  General Appearance: NAD, deaf/mute but understands questions  HENT: normocephalic/atraumatic, moist mucus membranes  Neck: No JVD, supple  Lungs: CTA with normal respiratory effort  CV: Tachycardic w/ RR, no m/r/g  Abdomen: soft, mild/mod TTP, hypoactive bowel sounds  Extremities: no cyanosis, no peripheral edema  Neuro: No focal deficits, motor/sensory intact  Skin: Normal color, intact  Lymphatics: No cervical or supraclavicular lymphadenopathy  Psych: appropriate affect, nods head to questions    Intake and Output:  Current Shift:  07/18 0701 - 07/18 1900  In: 186.3 [I.V.:186.3]  Out: 145 [Urine:145]  Last three shifts:  07/16 1901 - 07/18 0700  In: 2147.6 [I.V.:2147.6]  Out: 9214 [Urine:1712]    Lab/Data Reviewed:  BMP:   Lab Results   Component Value Date/Time     (H) 07/18/2017 03:45 AM    K 4.1 07/18/2017 03:45 AM     (H) 07/18/2017 03:45 AM    CO2 21 07/18/2017 03:45 AM    AGAP 12 07/18/2017 03:45 AM     (H) 07/18/2017 03:45 AM    BUN 95 (H) 07/18/2017 03:45 AM    CREA 4.70 (H) 07/18/2017 03:45 AM    GFRAA 15 (L) 07/18/2017 03:45 AM    GFRNA 12 (L) 07/18/2017 03:45 AM     CBC:   Lab Results   Component Value Date/Time    WBC 15.7 (H) 07/18/2017 03:45 AM    HGB 13.2 07/18/2017 03:45 AM    HCT 42.5 07/18/2017 03:45 AM     07/18/2017 03:45 AM       Imaging Reviewed:  Bennett Lang Abd (kub)    Result Date: 7/18/2017  EXAM: Abdomen single supine view INDICATION: Abdominal distention. COMPARISON: 7/16/2017 ____________ FINDINGS: Increasing colonic distention is present, measuring up to 13 cm, compared to 10 cm on prior examination. No small bowel dilation. No abnormal calcifications are identified. Visualized soft tissues are unremarkable. Visualized osseous structures are within normal limits.   ____________     IMPRESSION: Increasing colonic distention, most prominent within the cecum since 7/16/2017       Medications Reviewed:  Current Facility-Administered Medications   Medication Dose Route Frequency    dextrose 5% infusion  75 mL/hr IntraVENous CONTINUOUS    acetaminophen (TYLENOL) tablet 650 mg  650 mg Oral Q4H PRN    glucose chewable tablet 16 g  4 Tab Oral PRN    glucagon (GLUCAGEN) injection 1 mg  1 mg IntraMUSCular PRN    dextrose (D50W) injection syrg 12.5-25 g  25-50 mL IntraVENous PRN    insulin lispro (HUMALOG) injection   SubCUTAneous Q6H    pantoprazole (PROTONIX) 40 mg in sodium chloride 0.9 % 10 mL injection  40 mg IntraVENous DAILY    [START ON 7/19/2017] vancomycin (VANCOCIN) 1,500 mg in 0.9% sodium chloride 500 mL IVPB  1,500 mg IntraVENous Q48H    [START ON 7/23/2017] VANCOMYCIN INFORMATION NOTE   Other ONCE    cefTRIAXone (ROCEPHIN) 1 g in 0.9% sodium chloride (MBP/ADV) 50 mL MBP  1 g IntraVENous Q24H    dilTIAZem (CARDIZEM) 100 mg in 0.9% sodium chloride (MBP/ADV) 100 mL infusion  0-15 mg/hr IntraVENous TITRATE    sodium chloride (NS) flush 5-10 mL  5-10 mL IntraVENous PRN    0.9% sodium chloride infusion 250 mL  250 mL IntraVENous PRN    latanoprost (XALATAN) 0.005 % ophthalmic solution 1 Drop  1 Drop Both Eyes QHS    nitroglycerin (NITRODUR) 0.4 mg/hr patch 1 Patch  1 Patch TransDERmal DAILY    brimonidine (ALPHAGAN) 0.2 % ophthalmic solution 1 Drop  1 Drop Both Eyes BID    dorzolamide-timolol (COSOPT) 22.3-6.8 mg/mL ophthalmic solution 1 Drop  1 Drop Both Eyes BID    senna (SENOKOT) tablet 8.6 mg  1 Tab Oral BID    sodium chloride (NS) flush 5-10 mL  5-10 mL IntraVENous Q8H    sodium chloride (NS) flush 5-10 mL  5-10 mL IntraVENous PRN    ondansetron (ZOFRAN) injection 4 mg  4 mg IntraVENous Q6H PRN    0.9% sodium chloride infusion  75 mL/hr IntraVENous PRN           Alma Kelley,   Internal Medicine, Hospitalist  Pager: [phone]  [address] [place] [address]

## 2017-07-18 NOTE — CDMP QUERY
Please clarify sepsis was POA.    =>  =>Other Explanation of clinical findings  =>Unable to Determine (no explanation of clinical findings)    The medical record reflects the following:    Risk: Pos blood and urine cx on adm. AMS noted by nursing home staff. Clinical Indicators: On adm, HR > 90, WBCs 14.5 with 90 neuts and 1 band. WBCs 16.6 on 7/18. Treatment: Rocephin, Levaquin, Vanco    Please clarify and document your clinical opinion in the progress notes and discharge summary including the definitive and/or presumptive diagnosis, (suspected or probable), related to the above clinical findings. Please include clinical findings supporting your diagnosis. If you DECLINE this query or would like to communicate with [employer], please utilize the \"[employer] message box\" at the TOP of the Progress Note on the right.       Thank you,    Jeremie Azevedo RN [phone] [facility]

## 2017-07-18 NOTE — PROGRESS NOTES
GI Update:   Patient passing large amounts of flatus per nursing. NGT placed. CT scan shows significant decompression of the colon. Will leave NGT and rectal tube in place overnight and re-assess in AM with repeat KUB.  Benign abdominal exam.

## 2017-07-18 NOTE — PROGRESS NOTES
Full note to follow. Patient with Non-oliguric NEPTALI on CKD 3 in the setting of sepsis and GI bleed. UO is over a liter and hemodynamically stable. Creatinine seems to be leveled off. Potassium better  Na worse. H/H stable and has no obvious bleeding. No indication for dialysis. He has Staph in blood and E.coli in urine. Source of Staph unclear. Consider CT abdomen and Echo. If not considering CT, should have renal ultrasound. Change IVF to D5.

## 2017-07-18 NOTE — CDMP QUERY
Patient noted to have systolic CHF. Please specify as acute or acute on chronic. Thank you.    =>  =>Other Explanation of clinical findings  =>Unable to Determine (no explanation of clinical findings)    The medical record reflects the following:    Risk: h/o CHF    Clinical Indicators: 7/16 RRT for tachycardia/tachypnea, basal crackles - treated with lasix 20 mg IV.  7/17 ECHO: Left ventricle: The ventricle was dilated. Systolic function was severely  reduced by visual assessment. Ejection fraction was estimated to be 15 %. There was severe diffuse hypokinesis. Right ventricle: The size was normal.  Mitral valve: There was mild regurgitation. Aortic valve: There was mild to moderate regurgitation. Treatment: Lasix x 1 dose, ECHO    Please clarify and document your clinical opinion in the progress notes and discharge summary including the definitive and/or presumptive diagnosis, (suspected or probable), related to the above clinical findings. Please include clinical findings supporting your diagnosis. If you DECLINE this query or would like to communicate with [facility], please utilize the \"[employer] message box\" at the TOP of the Progress Note on the right.       Thank you,    Andrew Ling RN [address] [place] [address]

## 2017-07-18 NOTE — PROGRESS NOTES
Reason for Renal Dosing:  Per Renal Dosing Policy    Indication: Bloodstream infection, UTI    Patient clinical status and labs ordered/reviewed. Pt Weight Weight: 87.3 kg (192 lb 6.4 oz)   Serum Creatinine Lab Results   Component Value Date/Time    Creatinine 4.70 07/18/2017 03:45 AM       Creatinine Clearance Estimated Creatinine Clearance: 15.6 mL/min (based on Cr of 4.7). BUN Lab Results   Component Value Date/Time    BUN 95 07/18/2017 03:45 AM       WBC Lab Results   Component Value Date/Time    WBC 15.7 07/18/2017 03:45 AM      Temperature Temp: 97.2 °F (36.2 °C)   HR Pulse (Heart Rate): 92     BP BP: 124/62           Drug type: Quinolone antibacterial      Drug/dose: Levofloxacin has been renally dosed to 750 mg IV for one dose today, then 500 mg every 48 hours. Continue to monitor.     Signed Johny Blackwell PHARMD  Date 7/18/2017  Time 11:37 AM

## 2017-07-18 NOTE — PROGRESS NOTES
Physical Exam   Skin:        Primary Nurse Johnny Pascual RN and Desirae Ríos RN performed a dual skin assessment on this patient. No impairment noted.

## 2017-07-19 ENCOUNTER — APPOINTMENT (OUTPATIENT)
Dept: GENERAL RADIOLOGY | Age: 72
DRG: 377 | End: 2017-07-19
Attending: INTERNAL MEDICINE
Payer: MEDICARE

## 2017-07-19 LAB
ALBUMIN SERPL BCP-MCNC: 1.9 G/DL (ref 3.4–5)
ANION GAP BLD CALC-SCNC: 13 MMOL/L (ref 3–18)
ATRIAL RATE: 95 BPM
BACTERIA SPEC CULT: NORMAL
BUN SERPL-MCNC: 87 MG/DL (ref 7–18)
BUN/CREAT SERPL: 24 (ref 12–20)
CALCIUM SERPL-MCNC: 9.5 MG/DL (ref 8.5–10.1)
CALCULATED P AXIS, ECG09: 72 DEGREES
CALCULATED R AXIS, ECG10: -62 DEGREES
CALCULATED T AXIS, ECG11: 107 DEGREES
CHLORIDE SERPL-SCNC: 112 MMOL/L (ref 100–108)
CK MB CFR SERPL CALC: 0.5 % (ref 0–4)
CK MB CFR SERPL CALC: 0.7 % (ref 0–4)
CK MB SERPL-MCNC: 1.5 NG/ML (ref 5–25)
CK MB SERPL-MCNC: 1.5 NG/ML (ref 5–25)
CK SERPL-CCNC: 218 U/L (ref 39–308)
CK SERPL-CCNC: 275 U/L (ref 39–308)
CO2 SERPL-SCNC: 22 MMOL/L (ref 21–32)
CREAT SERPL-MCNC: 3.68 MG/DL (ref 0.6–1.3)
DATE LAST DOSE: NORMAL
DIAGNOSIS, 93000: NORMAL
GLUCOSE BLD STRIP.AUTO-MCNC: 146 MG/DL (ref 70–110)
GLUCOSE BLD STRIP.AUTO-MCNC: 151 MG/DL (ref 70–110)
GLUCOSE BLD STRIP.AUTO-MCNC: 155 MG/DL (ref 70–110)
GLUCOSE SERPL-MCNC: 150 MG/DL (ref 74–99)
P-R INTERVAL, ECG05: 210 MS
PHOSPHATE SERPL-MCNC: 4.8 MG/DL (ref 2.5–4.9)
POTASSIUM SERPL-SCNC: 4 MMOL/L (ref 3.5–5.5)
Q-T INTERVAL, ECG07: 396 MS
QRS DURATION, ECG06: 134 MS
QTC CALCULATION (BEZET), ECG08: 497 MS
REPORTED DOSE,DOSE: NORMAL UNITS
REPORTED DOSE/TIME,TMG: 2200
SERVICE CMNT-IMP: NORMAL
SODIUM SERPL-SCNC: 147 MMOL/L (ref 136–145)
TROPONIN I SERPL-MCNC: 0.11 NG/ML (ref 0–0.04)
TROPONIN I SERPL-MCNC: 0.13 NG/ML (ref 0–0.04)
VANCOMYCIN TROUGH SERPL-MCNC: 14.6 UG/ML (ref 10–20)
VENTRICULAR RATE, ECG03: 95 BPM

## 2017-07-19 PROCEDURE — 74011000258 HC RX REV CODE- 258: Performed by: INTERNAL MEDICINE

## 2017-07-19 PROCEDURE — 93308 TTE F-UP OR LMTD: CPT

## 2017-07-19 PROCEDURE — 80202 ASSAY OF VANCOMYCIN: CPT | Performed by: HOSPITALIST

## 2017-07-19 PROCEDURE — 74011250636 HC RX REV CODE- 250/636: Performed by: INTERNAL MEDICINE

## 2017-07-19 PROCEDURE — 84484 ASSAY OF TROPONIN QUANT: CPT | Performed by: FAMILY MEDICINE

## 2017-07-19 PROCEDURE — 74011636637 HC RX REV CODE- 636/637: Performed by: HOSPITALIST

## 2017-07-19 PROCEDURE — 65660000000 HC RM CCU STEPDOWN

## 2017-07-19 PROCEDURE — 77030020263 HC SOL INJ SOD CL0.9% LFCR 1000ML

## 2017-07-19 PROCEDURE — 82962 GLUCOSE BLOOD TEST: CPT

## 2017-07-19 PROCEDURE — 74011250637 HC RX REV CODE- 250/637: Performed by: HOSPITALIST

## 2017-07-19 PROCEDURE — 77030008771 HC TU NG SALEM SUMP -A

## 2017-07-19 PROCEDURE — 74000 XR ABD (KUB): CPT

## 2017-07-19 PROCEDURE — 74011000250 HC RX REV CODE- 250: Performed by: INTERNAL MEDICINE

## 2017-07-19 PROCEDURE — C9113 INJ PANTOPRAZOLE SODIUM, VIA: HCPCS | Performed by: INTERNAL MEDICINE

## 2017-07-19 PROCEDURE — 80069 RENAL FUNCTION PANEL: CPT | Performed by: INTERNAL MEDICINE

## 2017-07-19 PROCEDURE — 36415 COLL VENOUS BLD VENIPUNCTURE: CPT | Performed by: INTERNAL MEDICINE

## 2017-07-19 PROCEDURE — 74011250636 HC RX REV CODE- 250/636: Performed by: HOSPITALIST

## 2017-07-19 PROCEDURE — 77030020257 HC SOL INJ SOD CL 0.45% 1000ML BG

## 2017-07-19 RX ORDER — SODIUM CHLORIDE 450 MG/100ML
100 INJECTION, SOLUTION INTRAVENOUS CONTINUOUS
Status: DISCONTINUED | OUTPATIENT
Start: 2017-07-19 | End: 2017-07-20

## 2017-07-19 RX ORDER — POLYETHYLENE GLYCOL 3350 17 G/17G
17 POWDER, FOR SOLUTION ORAL DAILY
Status: DISCONTINUED | OUTPATIENT
Start: 2017-07-19 | End: 2017-07-28 | Stop reason: HOSPADM

## 2017-07-19 RX ADMIN — Medication 10 ML: at 14:00

## 2017-07-19 RX ADMIN — SODIUM CHLORIDE 100 ML/HR: 450 INJECTION, SOLUTION INTRAVENOUS at 23:09

## 2017-07-19 RX ADMIN — SODIUM CHLORIDE 1250 MG: 900 INJECTION, SOLUTION INTRAVENOUS at 19:00

## 2017-07-19 RX ADMIN — INSULIN LISPRO 3 UNITS: 100 INJECTION, SOLUTION INTRAVENOUS; SUBCUTANEOUS at 05:44

## 2017-07-19 RX ADMIN — SODIUM CHLORIDE 40 MG: 9 INJECTION INTRAMUSCULAR; INTRAVENOUS; SUBCUTANEOUS at 08:20

## 2017-07-19 RX ADMIN — DORZOLAMIDE HYDROCHLORIDE AND TIMOLOL MALEATE 1 DROP: 20; 5 SOLUTION/ DROPS OPHTHALMIC at 08:10

## 2017-07-19 RX ADMIN — SODIUM CHLORIDE 100 ML/HR: 450 INJECTION, SOLUTION INTRAVENOUS at 09:58

## 2017-07-19 RX ADMIN — Medication 10 ML: at 06:35

## 2017-07-19 RX ADMIN — BRIMONIDINE TARTRATE 1 DROP: 2 SOLUTION OPHTHALMIC at 08:10

## 2017-07-19 RX ADMIN — DEXTROSE MONOHYDRATE 50 ML/HR: 5 INJECTION, SOLUTION INTRAVENOUS at 09:57

## 2017-07-19 RX ADMIN — Medication 10 ML: at 23:12

## 2017-07-19 NOTE — PROGRESS NOTES
[facility] Dosing Services     Pharmacy dosing ABX empirically for treatment BMI     Day of Therapy: 2    Labs:   Bun/Serum Creatinine - 87 mg/dl / 3.68 mg/dl  CrCl - 19.9 ml/min  WBC - 15.7  Max temp: - 99    Cultures:   Blood, urine: negative    Plan:   Vancomycin:   Goal trough 15-20. Loading dose: 2 gm IV x1 on 2/17. Plan: renal function improving. Check random Vanco-level ~1500. If level returned<15 mcg/ml, proceed with 1.25 gm iv q36h. .     Levaquin:  750 mg iv x1 yesterday. Continue with 500 mg IV q48h tomorrow. Pharmacy to follow and will make changes to dose and/or frequency based on clinical status. Thanks for the consult.

## 2017-07-19 NOTE — ROUTINE PROCESS
0720-Bedside shift change report given to Jazmin Voss RN (oncoming nurse) by Zaina Jean-Baptiste RN (offgoing nurse). Report included the following information SBAR, MAR and Cardiac Rhythm A-Fib, PVC's, AVB first degree. 8551- This RN spoke to Dr. Juan Pablo Gallo regarding NG tube. Aware patient pulled NG tube out and reports to leave out at this time and not re-insert. 1445- TRANSFER - OUT REPORT:    Verbal report given to Shameka Reyes(name) on José Miguel [place]  being transferred to [facility](unit) for routine progression of care       Report consisted of patients Situation, Background, Assessment and   Recommendations(SBAR). Information from the following report(s) SBAR, MAR and Cardiac Rhythm A fib- w/ PVC's was reviewed with the receiving nurse.     Lines:   Peripheral IV 07/15/17 Left Antecubital (Active)   Site Assessment Clean, dry, & intact 7/19/2017 11:00 AM   Phlebitis Assessment 0 7/19/2017  7:30 AM   Infiltration Assessment 0 7/19/2017  7:30 AM   Dressing Status Clean, dry, & intact 7/19/2017  7:30 AM   Dressing Type Tape;Transparent 7/19/2017  7:30 AM   Hub Color/Line Status Green 7/19/2017  7:30 AM   Action Taken Open ports on tubing capped 7/19/2017  7:30 AM   Alcohol Cap Used Yes 7/19/2017  7:30 AM       Peripheral IV 07/15/17 Right Antecubital (Active)   Site Assessment Clean, dry, & intact 7/19/2017 11:00 AM   Phlebitis Assessment 0 7/19/2017  7:30 AM   Infiltration Assessment 0 7/19/2017  7:30 AM   Dressing Status Clean, dry, & intact 7/19/2017  7:30 AM   Dressing Type Tape;Transparent 7/19/2017  7:30 AM   Hub Color/Line Status Green 7/19/2017  7:30 AM   Action Taken Open ports on tubing capped 7/19/2017  7:30 AM   Alcohol Cap Used Yes 7/19/2017  7:30 AM       Peripheral IV 07/15/17 Left Forearm (Active)   Site Assessment Clean, dry, & intact 7/19/2017 11:00 AM   Phlebitis Assessment 0 7/19/2017  7:30 AM   Infiltration Assessment 0 7/19/2017  7:30 AM   Dressing Status Clean, dry, & intact 7/19/2017  7:30 AM Dressing Type Tape;Transparent 7/19/2017  7:30 AM   Hub Color/Line Status Pink 7/19/2017  7:30 AM   Action Taken Open ports on tubing capped 7/19/2017  7:30 AM   Alcohol Cap Used Yes 7/19/2017  7:30 AM        Opportunity for questions and clarification was provided.       Patient transported with:   Monitor  Registered Nurse

## 2017-07-19 NOTE — PROGRESS NOTES
Gastrointestinal Progress Note    Patient Name: Maycol Velasquez    Today's Date: 7/19/2017    Admit Date: 7/15/2017    Impression:  -Colonic dilation, most suggestive of colonic pseudo-obstruction; improved  -Fecal occult positive stool; no overt bleeding  -Anemia, Hb stable after PRBC transfusion  -Mental status changes, resolved  -NEPTALI on CKD 3  -St Aureus bacteremia, Ecoli UTI   -Cardiomyopathy  -Leukocytosis      Recommendations:  -OK to leave NGT out  -Can remove flex-seal  -Start miralax once daily  -Avoid narcotics and anticholinergics  -Turn patient in bed to promote colonic air mobilization  -Repeat KUB in the AM and plan for diet tomorrow if stable/improving    Subjective:     Maycol Velasquez is a 67 y.o. Male followed for fecal occult positive stool. CT yesterday showed significant decompression of the colon. Stable mild right colonic dilation this AM on KUB. No BM's per nursing. Had Echo showing EF of 15%. Hb stable at 12.9. WBC up to 16.6.        Current Facility-Administered Medications   Medication Dose Route Frequency    0.45% sodium chloride infusion  100 mL/hr IntraVENous CONTINUOUS    [START ON 7/20/2017] Vancomycin- Pharmacy will dose regimen based on random level  1 Each Other DAILY    vancomycin (VANCOCIN) 1,250 mg in 0.9% sodium chloride 250 mL IVPB  1,250 mg IntraVENous Q36H    dextrose 5% infusion  50 mL/hr IntraVENous CONTINUOUS    acetaminophen (TYLENOL) tablet 650 mg  650 mg Oral Q4H PRN    [START ON 7/20/2017] levoFLOXacin (LEVAQUIN) 500 mg in D5W IVPB  500 mg IntraVENous Q48H    glucose chewable tablet 16 g  4 Tab Oral PRN    glucagon (GLUCAGEN) injection 1 mg  1 mg IntraMUSCular PRN    dextrose (D50W) injection syrg 12.5-25 g  25-50 mL IntraVENous PRN    insulin lispro (HUMALOG) injection   SubCUTAneous Q6H    pantoprazole (PROTONIX) 40 mg in sodium chloride 0.9 % 10 mL injection  40 mg IntraVENous DAILY    dilTIAZem (CARDIZEM) 100 mg in 0.9% sodium chloride (MBP/ADV) 100 mL infusion  0-15 mg/hr IntraVENous TITRATE    sodium chloride (NS) flush 5-10 mL  5-10 mL IntraVENous PRN    0.9% sodium chloride infusion 250 mL  250 mL IntraVENous PRN    latanoprost (XALATAN) 0.005 % ophthalmic solution 1 Drop  1 Drop Both Eyes QHS    nitroglycerin (NITRODUR) 0.4 mg/hr patch 1 Patch  1 Patch TransDERmal DAILY    brimonidine (ALPHAGAN) 0.2 % ophthalmic solution 1 Drop  1 Drop Both Eyes BID    dorzolamide-timolol (COSOPT) 22.3-6.8 mg/mL ophthalmic solution 1 Drop  1 Drop Both Eyes BID    senna (SENOKOT) tablet 8.6 mg  1 Tab Oral BID    sodium chloride (NS) flush 5-10 mL  5-10 mL IntraVENous Q8H    sodium chloride (NS) flush 5-10 mL  5-10 mL IntraVENous PRN    ondansetron (ZOFRAN) injection 4 mg  4 mg IntraVENous Q6H PRN    0.9% sodium chloride infusion  75 mL/hr IntraVENous PRN          Objective:     Physical Exam:    Visit Vitals    /64    Pulse 86    Temp 98.8 °F (37.1 °C)    Resp 15    Ht 6' (1.829 m)    Wt 87.3 kg (192 lb 8 oz)    SpO2 94%    BMI 26.11 kg/m2     Gen: Awake and alert; nods head to me speaking  CV: RRR, no murmur  Pulm: CTA B/L  Abd: Soft, not distended; positive bowel sounds, no obvious tenderness on exam today  Extr: no edema    Data Review:    Labs: Results:       Chemistry Recent Labs      07/19/17   0405  07/18/17   1352  07/18/17   0345  07/17/17   0350   GLU  150*  161*  133*  203*   NA  147*  149*  149*  146*   K  4.0  4.0  4.1  5.3   CL  112*  115*  116*  113*   CO2  22  21  21  20*   BUN  87*  95*  95*  83*   CREA  3.68*  4.46*  4.70*  4.68*   CA  9.5  9.4  8.9  9.5   AGAP  13  13  12  13   BUCR  24*  21*  20  18   AP   --    --   169*  191*   TP   --    --   8.2  8.7*   ALB  1.9*   --   2.0*  2.3*   GLOB   --    --   6.2*  6.4*   AGRAT   --    --   0.3*  0.4*      CBC w/Diff Recent Labs      07/18/17   1352  07/18/17   0345  07/17/17   0350   WBC  16.6*  15.7*  13.6*   RBC  5.06  5.14  5.87*   HGB  12.9*  13.2  14.8   HCT  42.0  42.5 48.1*   PLT  283  277  304   GRANS  87*  85*  88*   LYMPH  8*  5*  9*   EOS  2  1  0      Coagulation Recent Labs      07/17/17   0350   PTP  17.1*   INR  1.5*       Liver Enzymes Recent Labs      07/19/17   0405  07/18/17   0345   TP   --   8.2   ALB  1.9*  2.0*   AP   --   169*   SGOT   --   31   ALT   --   16              Akila David MD  July 19, 2017

## 2017-07-19 NOTE — PROGRESS NOTES
RENAL PROGRESS NOTE        Ryan Avelar         Assessment/Plan:   · NEPTALI ( ischemic atn in a setting of uti/sepsis/gi bleeding). Non oliguric, scr is improving. Continue ivf given npo status. · CKD 3 due to presumably dm/htn. · Hyperkalemia in context of neptali on ckd 3/use of aldactone. Resolved. · Hypovolemic hyponatremia. Continue hypotonic ivf. · Staph aureus sepsis. Source? Abx per primary team.   · E. Coli uti. · HTN. Stable. · Systolic chf. No overt decompensation. · GI bleed/colonic pseudoobstruction. GI on the case. Subjective:  Patient complaints off: NAD, opens eyes to voice. Pulled ng tube. Off cardizem drip.          Patient Active Problem List   Diagnosis Code    Acute upper GI bleed K92.2    NEPTALI (acute kidney injury) ([facility] [address] N17.9    Febrile illness, acute R50.9    Lower GI bleed K92.2    Altered mental status R41.82    CKD (chronic kidney disease) stage 3, GFR 30-59 ml/min N18.3    Urinary tract infection without hematuria Y23.1    Systolic CHF, chronic (HCC) I50.22       Current Facility-Administered Medications   Medication Dose Route Frequency Provider Last Rate Last Dose    dextrose 5% infusion  75 mL/hr IntraVENous CONTINUOUS Ameena Miranda MD 75 mL/hr at 07/18/17 2349 75 mL/hr at 07/18/17 2349    acetaminophen (TYLENOL) tablet 650 mg  650 mg Oral Q4H PRN Rekha Mari DO        [START ON 7/20/2017] levoFLOXacin (LEVAQUIN) 500 mg in D5W IVPB  500 mg IntraVENous Q48H Rekha Mari DO        glucose chewable tablet 16 g  4 Tab Oral PRN Rekha Mari, DO        glucagon (GLUCAGEN) injection 1 mg  1 mg IntraMUSCular PRN Rekha Mari, DO        dextrose (D50W) injection syrg 12.5-25 g  25-50 mL IntraVENous PRN Rekha Mari, DO        insulin lispro (HUMALOG) injection   SubCUTAneous Q6H Rekha Mari DO   3 Units at 07/19/17 0544    pantoprazole (PROTONIX) 40 mg in sodium chloride 0.9 % 10 mL injection  40 mg IntraVENous DAILY Ronal Mcfadden MD   40 mg at 07/19/17 0820    dilTIAZem (CARDIZEM) 100 mg in 0.9% sodium chloride (MBP/ADV) 100 mL infusion  0-15 mg/hr IntraVENous TITRATE Rashaad Durbin MD   Stopped at 07/18/17 2153    sodium chloride (NS) flush 5-10 mL  5-10 mL IntraVENous PRN Bull Irby MD        0.9% sodium chloride infusion 250 mL  250 mL IntraVENous PRN Bull Irby MD        latanoprost (XALATAN) 0.005 % ophthalmic solution 1 Drop  1 Drop Both Eyes QHS Rashaad Durbin MD   1 Drop at 07/18/17 2141    nitroglycerin (NITRODUR) 0.4 mg/hr patch 1 Patch  1 Patch TransDERmal DAILY Rashaad Durbin MD   1 Patch at 07/19/17 0820    brimonidine (ALPHAGAN) 0.2 % ophthalmic solution 1 Drop  1 Drop Both Eyes BID Rashaad Durbin MD   1 Drop at 07/19/17 0810    dorzolamide-timolol (COSOPT) 22.3-6.8 mg/mL ophthalmic solution 1 Drop  1 Drop Both Eyes BID Rashaad Durbin MD   1 Drop at 07/19/17 0810    senna (SENOKOT) tablet 8.6 mg  1 Tab Oral BID Rashaad Durbin MD   Stopped at 07/18/17 1800    sodium chloride (NS) flush 5-10 mL  5-10 mL IntraVENous Q8H Rashaad Durbin MD   10 mL at 07/19/17 0635    sodium chloride (NS) flush 5-10 mL  5-10 mL IntraVENous PRN Rashaad Durbin MD        ondansetron [facility]) injection 4 mg  4 mg IntraVENous Q6H PRN MD Macarena Chahal.9% sodium chloride infusion  75 mL/hr IntraVENous PRN Rashaad Durbin MD           Objective  Vitals:    07/19/17 0617 07/19/17 0630 07/19/17 0700 07/19/17 0800   BP:  122/54 144/70 156/69   Pulse:  96 95 90   Resp:  17 19 16   Temp:    98.8 °F (37.1 °C)   SpO2:  92% 93% 94%   Weight: 87.3 kg (192 lb 8 oz)      Height:             Intake/Output Summary (Last 24 hours) at 07/19/17 0921  Last data filed at 07/19/17 0730   Gross per 24 hour   Intake          1949.71 ml   Output             2285 ml   Net -335.29 ml           Admission weight: Weight: 89.4 kg (197 lb) (07/15/17 1530)  Last Weight Metrics:  Weight Loss Metrics 7/19/2017 2/17/2017 3/15/2016   Today's Wt 192 lb 8 oz 205 lb 1.6 oz 223 lb   BMI 26.11 kg/m2 29.43 kg/m2 30.24 kg/m2             Physical Assessment:     General: NAD. Dry oral mucosa. Neck: No jvd. LUNGS: Clear to Auscultation, No rales, rhonchi or wheezes. CVS EXM: S1, S2  RRR, no murmurs/gallops/rubs. Abdomen: soft, non tender, slightly distended, pos bs. Lower Extremities:  trace edema. Lab    CBC w/Diff Recent Labs      07/18/17   1352  07/18/17   0345  07/17/17   0350   WBC  16.6*  15.7*  13.6*   RBC  5.06  5.14  5.87*   HGB  12.9*  13.2  14.8   HCT  42.0  42.5  48.1*   PLT  283  277  304   GRANS  87*  85*  88*   LYMPH  8*  5*  9*   EOS  2  1  0        Chemistry Recent Labs      07/19/17   0405  07/18/17   1352  07/18/17   0345  07/17/17   0350   GLU  150*  161*  133*  203*   NA  147*  149*  149*  146*   K  4.0  4.0  4.1  5.3   CL  112*  115*  116*  113*   CO2  22  21  21  20*   BUN  87*  95*  95*  83*   CREA  3.68*  4.46*  4.70*  4.68*   CA  9.5  9.4  8.9  9.5   AGAP  13  13  12  13   BUCR  24*  21*  20  18   AP   --    --   169*  191*   TP   --    --   8.2  8.7*   ALB  1.9*   --   2.0*  2.3*   GLOB   --    --   6.2*  6.4*   AGRAT   --    --   0.3*  0.4*   PHOS  4.8   --    --    --          Lab Results   Component Value Date/Time    Iron 15 07/17/2017 03:50 AM    TIBC 181 07/17/2017 03:50 AM    Iron % saturation 8 07/17/2017 03:50 AM    Ferritin 12 03/15/2016 09:50 AM    Lab Results   Component Value Date/Time    Calcium 9.5 07/19/2017 04:05 AM    Phosphorus 4.8 07/19/2017 04:05 AM        Julian Miller M.D.   [facility]  Phone

## 2017-07-19 NOTE — PROGRESS NOTES
Progress Note      Patient: Ahsan Gaitan               Sex: male          DOA: 7/15/2017       YOB: 1945      Age:  67 y.o.        LOS:  LOS: 4 days               Subjective:   Pt has had a 2 d echo and his ef is 15 % he does not seem to be in failure at the present time . As per gi note  The pt's  Ct scan shows that the  colon has undergone significant decompression . The pt's abdomen is presently soft and nontender to palpation and he has bowel sounds . The kub today shows mild colonic dilation . His troponin is 0.13. it was 0.24 in the past couple of days . The creatinine is improving to 3.68.pt pulled out his ng tube       Objective:      Visit Vitals    /64    Pulse 86    Temp 98.8 °F (37.1 °C)    Resp 15    Ht 6' (1.829 m)    Wt 87.3 kg (192 lb 8 oz)    SpO2 94%    BMI 26.11 kg/m2       Physical Exam:  Pt is a deaf mute . reads lips however. Heart reg rate and rhythm   Lungs good breath sounds heard   Abdomen soft bowel sounds heard   Neuro moves all extremities .      Lab/Data Reviewed:  CMP:   Lab Results   Component Value Date/Time     (H) 07/19/2017 04:05 AM    K 4.0 07/19/2017 04:05 AM     (H) 07/19/2017 04:05 AM    CO2 22 07/19/2017 04:05 AM    AGAP 13 07/19/2017 04:05 AM     (H) 07/19/2017 04:05 AM    BUN 87 (H) 07/19/2017 04:05 AM    CREA 3.68 (H) 07/19/2017 04:05 AM    GFRAA 20 (L) 07/19/2017 04:05 AM    GFRNA 16 (L) 07/19/2017 04:05 AM    CA 9.5 07/19/2017 04:05 AM    PHOS 4.8 07/19/2017 04:05 AM    ALB 1.9 (L) 07/19/2017 04:05 AM     CBC:   Lab Results   Component Value Date/Time    WBC 16.6 (H) 07/18/2017 01:52 PM    HGB 12.9 (L) 07/18/2017 01:52 PM    HCT 42.0 07/18/2017 01:52 PM     07/18/2017 01:52 PM           Assessment/Plan     Principal Problem:    Lower GI bleed (7/15/2017)    Active Problems:    Altered mental status (7/15/2017)      CKD (chronic kidney disease) stage 3, GFR 30-59 ml/min (7/15/2017)      Urinary tract infection without hematuria (6/08/0274)      Systolic CHF, chronic ([address] (7/18/2017)        Plan:will move to the floor on telemetry . A kub and a chest x ray for tomorrow .  Will continue to monitor the troponins

## 2017-07-19 NOTE — PROGRESS NOTES
conducted a Follow up consultation and Spiritual Assessment for Ryan Avelar, who is a 67 y. o.,male. The  provided the following Interventions:  Continued the relationship of care and support. Listened empathically. Offered prayer and assurance of continued prayer on patients behalf. Chart reviewed. The following outcomes were achieved:  Patient expressed gratitude for 's visit. Assessment:  There are no spiritual or Orthodoxy issues which require intervention at this time. Plan:  Chaplains will continue to follow and will provide pastoral care on an as needed/requested basis.  recommends bedside caregivers page  on duty if patient shows signs of acute spiritual or emotional distress. Cheyanne Dickey M.Div.   [facility]  [phone]

## 2017-07-19 NOTE — PROGRESS NOTES
EKG performed per protocol after changes noted from previous shift. Cardizem drip stopped at this time. will continue to monitor.   EKG Results     Procedure 720 Value Units Date/Time    EKG, 12 LEAD, SUBSEQUENT [[id number]] Collected:  07/18/17 2203    Order Status:  Completed Updated:  07/18/17 2205     Ventricular Rate 95 BPM      Atrial Rate 95 BPM      P-R Interval 210 ms      QRS Duration 134 ms      Q-T Interval 396 ms      QTC Calculation (Bezet) 497 ms      Calculated P Axis 72 degrees      Calculated R Axis -62 degrees      Calculated T Axis 107 degrees      Diagnosis --     Sinus rhythm with 1st degree AV block  Left axis deviation  Left ventricular hypertrophy with QRS widening and repolarization abnormality  Abnormal ECG  When compared with ECG of 16-JUL-2017 22:10,  WV interval has increased  QRS duration has increased      SCANNED CARDIAC RHYTHM STRIP [[id number]] Collected:  07/18/17 0946    Order Status:  Completed Updated:  07/18/17 1016    SCANNED CARDIAC RHYTHM STRIP [[id number]] Collected:  07/17/17 0942    Order Status:  Completed Updated:  07/17/17 1121    EKG, 12 LEAD, SUBSEQUENT [[id number]] Collected:  07/16/17 2210    Order Status:  Completed Updated:  07/17/17 0902     Ventricular Rate 134 BPM      Atrial Rate 134 BPM      P-R Interval 88 ms      QRS Duration 114 ms      Q-T Interval 382 ms      QTC Calculation (Bezet) 570 ms      Calculated R Axis -63 degrees      Calculated T Axis 98 degrees      Diagnosis --     Sinus tachycardia with short WV  Left axis deviation  Left ventricular hypertrophy with repolarization abnormality  Cannot rule out Septal infarct , age undetermined  Abnormal ECG  When compared with ECG of 15-JUL-2017 15:05,  Left bundle branch block is no longer present  Minimal criteria for Septal infarct are now present  Confirmed by Jovita Espino ([id number]) on 7/17/2017 9:02:02 AM      EKG, 12 LEAD, INITIAL [[id number]] Collected:  07/15/17 1505    Order Status:  Completed Updated:  07/17/17 2672     Ventricular Rate 106 BPM      Atrial Rate 106 BPM      P-R Interval 184 ms      QRS Duration 146 ms      Q-T Interval 390 ms      QTC Calculation (Bezet) 518 ms      Calculated P Axis -26 degrees      Calculated R Axis -65 degrees      Calculated T Axis 94 degrees      Diagnosis --     Sinus tachycardia  Left axis deviation  Left bundle branch block  Poor R Wave Progression  Abnormal ECG  When compared with ECG of 15-FEB-2017 14:57,  Left bundle branch block is now present  Confirmed by Kristina Armstrong [id number]) on 7/17/2017 8:52:40 AM      SCANNED CARDIAC RHYTHM STRIP [[id number]] Collected:  07/16/17 1230    Order Status:  Completed Updated:  07/16/17 1350    EKG, 12 LEAD, INITIAL [[id number]]     Order Status:  Canceled         0615  called lab to notify that cardiac enzymes were ordered to be added to the morning labs    Lab Results   Component Value Date/Time     07/19/2017 04:05 AM    CK - MB 1.5 07/19/2017 04:05 AM    CK-MB Index 0.5 07/19/2017 04:05 AM    Troponin-I, Qt. 0.13 07/19/2017 04:05 AM

## 2017-07-19 NOTE — ROUTINE PROCESS
1900- assumed care of pt from [facility], [address] [place] [address]. Pt resting quietly, vital signs stable. Pt continues on cardizem drip and on room air. No s/s of distress at this time. NG tube on low intermittent suction. Will continue to monitor. Primary Nurse Jacinta Waddell RN and Gita Mcmullen RN performed a dual skin assessment on this patient Impairment noted- see wound doc flow sheet    0400-went into room to check on pt and saw that he had pulled out NG tube. Paged Dr. Derrell Coles to inform him. Up until that point, total output for NG was 125.     6639- paged GI to inform them that pt pulled out NG tube. 0700-Bedside shift change report given to Arthur Workman RN (oncoming nurse) by Jacinta Waddell RN   (offgoing nurse). Report included the following information SBAR, Procedure Summary, Intake/Output, MAR and Recent Results.

## 2017-07-20 ENCOUNTER — APPOINTMENT (OUTPATIENT)
Dept: GENERAL RADIOLOGY | Age: 72
DRG: 377 | End: 2017-07-20
Attending: INTERNAL MEDICINE
Payer: MEDICARE

## 2017-07-20 ENCOUNTER — APPOINTMENT (OUTPATIENT)
Dept: GENERAL RADIOLOGY | Age: 72
DRG: 377 | End: 2017-07-20
Attending: HOSPITALIST
Payer: MEDICARE

## 2017-07-20 LAB
ALBUMIN SERPL BCP-MCNC: 1.9 G/DL (ref 3.4–5)
ALBUMIN/GLOB SERPL: 0.3 {RATIO} (ref 0.8–1.7)
ALP SERPL-CCNC: 137 U/L (ref 45–117)
ALT SERPL-CCNC: 13 U/L (ref 16–61)
ANION GAP BLD CALC-SCNC: 9 MMOL/L (ref 3–18)
AST SERPL W P-5'-P-CCNC: 28 U/L (ref 15–37)
BACTERIA SPEC CULT: ABNORMAL
BACTERIA SPEC CULT: ABNORMAL
BASOPHILS # BLD AUTO: 0 K/UL (ref 0–0.06)
BASOPHILS # BLD: 0 % (ref 0–2)
BILIRUB SERPL-MCNC: 0.8 MG/DL (ref 0.2–1)
BUN SERPL-MCNC: 73 MG/DL (ref 7–18)
BUN/CREAT SERPL: 26 (ref 12–20)
CALCIUM SERPL-MCNC: 8.9 MG/DL (ref 8.5–10.1)
CHLORIDE SERPL-SCNC: 111 MMOL/L (ref 100–108)
CO2 SERPL-SCNC: 22 MMOL/L (ref 21–32)
CREAT SERPL-MCNC: 2.78 MG/DL (ref 0.6–1.3)
DIFFERENTIAL METHOD BLD: ABNORMAL
EOSINOPHIL # BLD: 0.3 K/UL (ref 0–0.4)
EOSINOPHIL NFR BLD: 3 % (ref 0–5)
ERYTHROCYTE [DISTWIDTH] IN BLOOD BY AUTOMATED COUNT: 18.7 % (ref 11.6–14.5)
GLOBULIN SER CALC-MCNC: 6.4 G/DL (ref 2–4)
GLUCOSE BLD STRIP.AUTO-MCNC: 113 MG/DL (ref 70–110)
GLUCOSE BLD STRIP.AUTO-MCNC: 126 MG/DL (ref 70–110)
GLUCOSE BLD STRIP.AUTO-MCNC: 137 MG/DL (ref 70–110)
GLUCOSE BLD STRIP.AUTO-MCNC: 146 MG/DL (ref 70–110)
GLUCOSE BLD STRIP.AUTO-MCNC: 185 MG/DL (ref 70–110)
GLUCOSE SERPL-MCNC: 116 MG/DL (ref 74–99)
GRAM STN SPEC: ABNORMAL
HCT VFR BLD AUTO: 43.9 % (ref 36–48)
HGB BLD-MCNC: 13.5 G/DL (ref 13–16)
LYMPHOCYTES # BLD AUTO: 7 % (ref 21–52)
LYMPHOCYTES # BLD: 0.7 K/UL (ref 0.9–3.6)
MCH RBC QN AUTO: 25.6 PG (ref 24–34)
MCHC RBC AUTO-ENTMCNC: 30.8 G/DL (ref 31–37)
MCV RBC AUTO: 83.1 FL (ref 74–97)
MONOCYTES # BLD: 0.7 K/UL (ref 0.05–1.2)
MONOCYTES NFR BLD AUTO: 7 % (ref 3–10)
NEUTS SEG # BLD: 8 K/UL (ref 1.8–8)
NEUTS SEG NFR BLD AUTO: 83 % (ref 40–73)
PLATELET # BLD AUTO: 251 K/UL (ref 135–420)
PMV BLD AUTO: 10.8 FL (ref 9.2–11.8)
POTASSIUM SERPL-SCNC: 3.6 MMOL/L (ref 3.5–5.5)
PROT SERPL-MCNC: 8.3 G/DL (ref 6.4–8.2)
RBC # BLD AUTO: 5.28 M/UL (ref 4.7–5.5)
SERVICE CMNT-IMP: ABNORMAL
SERVICE CMNT-IMP: ABNORMAL
SODIUM SERPL-SCNC: 142 MMOL/L (ref 136–145)
VANCOMYCIN SERPL-MCNC: 19.2 UG/ML (ref 5–40)
WBC # BLD AUTO: 9.7 K/UL (ref 4.6–13.2)

## 2017-07-20 PROCEDURE — 77030020250 HC SOL INJ D 5% LFCR 1000ML BG LF

## 2017-07-20 PROCEDURE — 87040 BLOOD CULTURE FOR BACTERIA: CPT | Performed by: INTERNAL MEDICINE

## 2017-07-20 PROCEDURE — C9113 INJ PANTOPRAZOLE SODIUM, VIA: HCPCS | Performed by: INTERNAL MEDICINE

## 2017-07-20 PROCEDURE — 36415 COLL VENOUS BLD VENIPUNCTURE: CPT | Performed by: HOSPITALIST

## 2017-07-20 PROCEDURE — 65660000000 HC RM CCU STEPDOWN

## 2017-07-20 PROCEDURE — 77010033678 HC OXYGEN DAILY

## 2017-07-20 PROCEDURE — 82962 GLUCOSE BLOOD TEST: CPT

## 2017-07-20 PROCEDURE — 74011250636 HC RX REV CODE- 250/636: Performed by: INTERNAL MEDICINE

## 2017-07-20 PROCEDURE — 74011250636 HC RX REV CODE- 250/636: Performed by: HOSPITALIST

## 2017-07-20 PROCEDURE — 74011000250 HC RX REV CODE- 250: Performed by: HOSPITALIST

## 2017-07-20 PROCEDURE — 74011000250 HC RX REV CODE- 250: Performed by: INTERNAL MEDICINE

## 2017-07-20 PROCEDURE — 73560 X-RAY EXAM OF KNEE 1 OR 2: CPT

## 2017-07-20 PROCEDURE — 73030 X-RAY EXAM OF SHOULDER: CPT

## 2017-07-20 PROCEDURE — 74000 XR ABD (KUB): CPT

## 2017-07-20 PROCEDURE — 80053 COMPREHEN METABOLIC PANEL: CPT | Performed by: HOSPITALIST

## 2017-07-20 PROCEDURE — 71010 XR CHEST PORT: CPT

## 2017-07-20 PROCEDURE — 85025 COMPLETE CBC W/AUTO DIFF WBC: CPT | Performed by: HOSPITALIST

## 2017-07-20 PROCEDURE — 77030020257 HC SOL INJ SOD CL 0.45% 1000ML BG

## 2017-07-20 PROCEDURE — 80202 ASSAY OF VANCOMYCIN: CPT | Performed by: HOSPITALIST

## 2017-07-20 PROCEDURE — 74011250637 HC RX REV CODE- 250/637: Performed by: HOSPITALIST

## 2017-07-20 RX ORDER — LEVOFLOXACIN 5 MG/ML
750 INJECTION, SOLUTION INTRAVENOUS
Status: DISCONTINUED | OUTPATIENT
Start: 2017-07-20 | End: 2017-07-20

## 2017-07-20 RX ORDER — CEFAZOLIN SODIUM 2 G/50ML
2 SOLUTION INTRAVENOUS ONCE
Status: COMPLETED | OUTPATIENT
Start: 2017-07-20 | End: 2017-07-21

## 2017-07-20 RX ADMIN — SODIUM CHLORIDE 40 MG: 9 INJECTION INTRAMUSCULAR; INTRAVENOUS; SUBCUTANEOUS at 10:42

## 2017-07-20 RX ADMIN — LATANOPROST 1 DROP: 50 SOLUTION OPHTHALMIC at 22:00

## 2017-07-20 RX ADMIN — DEXTROSE MONOHYDRATE 50 ML/HR: 5 INJECTION, SOLUTION INTRAVENOUS at 06:42

## 2017-07-20 RX ADMIN — CEFAZOLIN SODIUM 2 G: 2 SOLUTION INTRAVENOUS at 17:00

## 2017-07-20 RX ADMIN — SENNOSIDES 8.6 MG: 8.6 TABLET, FILM COATED ORAL at 17:00

## 2017-07-20 RX ADMIN — Medication 10 ML: at 22:40

## 2017-07-20 RX ADMIN — Medication 10 ML: at 13:08

## 2017-07-20 RX ADMIN — LEVOFLOXACIN 750 MG: 5 INJECTION, SOLUTION INTRAVENOUS at 12:39

## 2017-07-20 RX ADMIN — Medication 10 ML: at 05:50

## 2017-07-20 NOTE — ROUTINE PROCESS
1230  Received pt from ICU. Shift assessment completed. Call bell within reach. 1900  Bedside and Verbal shift change report given to Abdi Chew (oncoming nurse) by Tobias Aguero (offgoing nurse). Report included the following information SBAR, Kardex, Intake/Output and MAR.

## 2017-07-20 NOTE — PROGRESS NOTES
NUTRITION follow-up/Plan of care    RECOMMENDATIONS:     1. Advance diet when medically indicated  2. Monitor weight, labs and PO intake  3. RD to follow    GOALS:     1. Ongoing: PO intake meets >75% of protein/calorie needs by 7/23  2. Met/Ongoing: Maintain weight (+/- 1-2 lb by 7/23)    ASSESSMENT:      Weight status is classified as overweight per BMI of 26.9. However, weight appropriate for age. Currently not meeting nutrition needs due to NPO diet order. Labs noted. Nutrition recommendations listed. Will monitor diet advancement. Nutrition Risk:  [x]   High []  Moderate [] Low    SUBJECTIVE/OBJECTIVE:     Transferred from ICU. Patient is deaf and non-verbal at baseline. Per GI, patient with colonic dilation, most suggestive of colonic pseudo-obstruction but improved. NG tube pulled by patient and not to be reinserted per MD. Currently NPO but noted plans for possible diet advancement today. Information Obtained From:   [x] Chart Review  [] Patient  [] Family/Caregiver  [] Nurse/Physician   [] Patient Rounds/Interdisciplinary Meeting    Diet: NPO  No data found. Medications: [x] Reviewed   Encounter Diagnoses     ICD-10-CM ICD-9-CM   1. Gastrointestinal hemorrhage, unspecified gastrointestinal hemorrhage type K92.2 578.9   2. Febrile illness, acute R50.9 780.60   3. Acute UTI N39.0 599.0   4.  Low hemoglobin D64.9 285.9     Past Medical History:   Diagnosis Date    CHF (congestive heart failure) ([facility]     DM (diabetes mellitus) ([facility]     Gout     HTN (hypertension)      Labs:    Lab Results   Component Value Date/Time    Sodium 142 07/20/2017 05:05 AM    Potassium 3.6 07/20/2017 05:05 AM    Chloride 111 07/20/2017 05:05 AM    CO2 22 07/20/2017 05:05 AM    Anion gap 9 07/20/2017 05:05 AM    Glucose 116 07/20/2017 05:05 AM    BUN 73 07/20/2017 05:05 AM    Creatinine 2.78 07/20/2017 05:05 AM    Calcium 8.9 07/20/2017 05:05 AM    Magnesium 2.5 07/16/2017 11:50 PM    Phosphorus 4.8 07/19/2017 04:05 AM    Albumin 1.9 07/20/2017 05:05 AM     Anthropometrics: BMI (calculated): 26.9   Last 3 Recorded Weights in this Encounter    07/19/17 0617 07/20/17 0340 07/20/17 0658   Weight: 87.3 kg (192 lb 8 oz) 90.3 kg (199 lb 1.2 oz) 90.1 kg (198 lb 9.6 oz)      Ht Readings from Last 1 Encounters:   07/16/17 6' (1.829 m)     []  Weight Loss  [x]  Weight Gain  []  Weight Stable   []  New wt n/a on record     Estimated Nutrition Needs:   2155 Kcals/day  Protein (g): 87 g    Nutrition Problems Identified:   [x] Suboptimal PO intake   [] Food Allergies  [] Difficulty chewing/swallowing/poor dentition  [] Constipation/Diarrhea   [] Nausea/Vomiting   [] None  [] Other:     Plan:   [] Therapeutic Diet  []  Obtained/adjusted food preferences/tolerances and/or snacks options   []  Supplements added   [] Occupational therapy following for feeding techniques  []  HS snack added   []  Modify diet texture   []  Modify diet for food allergies   []  Educate patient   []  Assist with menu selection   []  Monitor PO intake on meal rounds   [x]  Continue inpatient monitoring and intervention   []  Participated in discharge planning/Interdisciplinary rounds/Team meetings   []  Other:     Education Needs:   [x] Not appropriate for teaching at this time due to: NPO   [] Identified and addressed    Nutrition Monitoring and Evaluation:   [x] Continue inpatient monitoring and interventions    [] Other:     Romana Kiel

## 2017-07-20 NOTE — PROGRESS NOTES
Prelim ID Consult (see Dictation [id number])    Patient: Rober Meza CSN: [id number]     YOB: 1945  Age: 67 y.o. Sex: male        Current abx Prior abx   Cefazolin 7/20-0 levaquin 7/18-2m ceftriaxone 7/15-3 vanco 7/17-2     Assessment ->Rec:     High grade MSSA BSI- POA 2 of 2 bctx's 7/15, also 7/17 and so far 1 of 2 7/18  -echo (TTE) neg x 2 for vegetation but with 5 of 6 positive concern for IE  -c/o R shoulder and R knee pain- may have chronic effusion R knee, no obvious STS R shoulder with post point tenderness, no reported back pain, no reproducible tenderness   -cxr ctap neg, feet ok ->repeat bctx's today  ->change levaquin to cefazolin 2 g then 1 q12 for eGFR<35  ->plain films R knee and shoulder with low threshold nuc med scan.  ->if further pos bctx's consider TONYA though will likely need full 4-6 week course of treatment    E coli UTI -ua adm sm francis est 4-6 wbc grew >100k E coli R amp -- initially treated ceftriaxone x 3 d, on levaquin, repeat uctx ng -isolate S cefazolin but consider treated at this point   Sepsis with T 102.5 7/16, wbc 14.4 7/16- suspect d/t SA BSI ->monitor on abx    chf with  EF 0.15-0.20 this adm    guaic pos stool on admission on coumadin, improved colonic distention  ->per GI   HTN    NEPTALI on CKD3  Cr 2.8 peak 4.7 7/18, was 2.6 2/17, 2.4 on adm    DM2 A1c 6.5    Deaf mute     H/o gout     Increased globulin TP 8.3 alb 1.9 ?  Chronic inflammation vs monoclonal  ->check spep     MICROBIOLOGY:   7/15  bctx x 2 mssa   uctx >100k E coli R amp  7/16 uctx ng  7/17 bctx x 2 mssa  7/18 bctx 1 of 2 mssa    LINES AND CATHETERS:   piv     Thanks -- Dr. Ramirez Cruz, Dr. Bello Antonio, Dr. Kirstin Mix, and I will follow with you    JCSchwab, MD  [facility]  July 20, 2017  [phone]

## 2017-07-20 NOTE — ROUTINE PROCESS
2005  Bedside and Verbal shift change report given to Nicolle BAUTSITA RN (oncoming nurse) Sumit Porras (offgoing nurse). Report included the following information SBAR, Kardex, Intake/Output, MAR and Recent Results. Patient awake in bed. Bed in low position, call bell within reach. Will continue to monitor.       2010  Shift assessment completed. Denies pain. Will continue to monitor.     0540  Pt slept through the night. Had an uneventful night.    0725  Bedside and Verbal shift change report given to  Desire JULIO RN (oncoming nurse) by Renate Byrne (offgoing nurse). Report included the following information SBAR, Kardex, Intake/Output, MAR, Recent Results and Cardiac Rhythm 1AVB w/BBB.

## 2017-07-20 NOTE — PROGRESS NOTES
Gastrointestinal Progress Note    Patient Name: Maycol Velasquez    SKFBH'N Date: 7/20/2017    Admit Date: 7/15/2017    Subjective:     Maycol Velasquez is a 67 y.o. Male with heme positive stools but no active bleeding noted. He was found to have HGB of 6 but then appears to have over corrected and the initial lab may have been error. He developed pseudoobstruction. CT noted no obstructing lesions. NGT placed with good decompression. Started on Miralax. Imaging appears much improved today.             Current Facility-Administered Medications   Medication Dose Route Frequency    ceFAZolin (ANCEF) 2g IVPB in 50 mL D5W  2 g IntraVENous ONCE    [START ON 7/21/2017] ceFAZolin (ANCEF) 1 g in 0.9% sodium chloride (MBP/ADV) 50 mL MBP  1 g IntraVENous Q12H    polyethylene glycol (MIRALAX) packet 17 g  17 g Oral DAILY    dextrose 5% infusion  50 mL/hr IntraVENous CONTINUOUS    acetaminophen (TYLENOL) tablet 650 mg  650 mg Oral Q4H PRN    glucose chewable tablet 16 g  4 Tab Oral PRN    glucagon (GLUCAGEN) injection 1 mg  1 mg IntraMUSCular PRN    dextrose (D50W) injection syrg 12.5-25 g  25-50 mL IntraVENous PRN    insulin lispro (HUMALOG) injection   SubCUTAneous Q6H    pantoprazole (PROTONIX) 40 mg in sodium chloride 0.9 % 10 mL injection  40 mg IntraVENous DAILY    dilTIAZem (CARDIZEM) 100 mg in 0.9% sodium chloride (MBP/ADV) 100 mL infusion  0-15 mg/hr IntraVENous TITRATE    sodium chloride (NS) flush 5-10 mL  5-10 mL IntraVENous PRN    0.9% sodium chloride infusion 250 mL  250 mL IntraVENous PRN    latanoprost (XALATAN) 0.005 % ophthalmic solution 1 Drop  1 Drop Both Eyes QHS    nitroglycerin (NITRODUR) 0.4 mg/hr patch 1 Patch  1 Patch TransDERmal DAILY    brimonidine (ALPHAGAN) 0.2 % ophthalmic solution 1 Drop  1 Drop Both Eyes BID    dorzolamide-timolol (COSOPT) 22.3-6.8 mg/mL ophthalmic solution 1 Drop  1 Drop Both Eyes BID    senna (SENOKOT) tablet 8.6 mg  1 Tab Oral BID    sodium chloride (NS) flush 5-10 mL  5-10 mL IntraVENous Q8H    sodium chloride (NS) flush 5-10 mL  5-10 mL IntraVENous PRN    ondansetron (ZOFRAN) injection 4 mg  4 mg IntraVENous Q6H PRN    0.9% sodium chloride infusion  75 mL/hr IntraVENous PRN          Objective:     Physical Exam:    Visit Vitals    /63    Pulse 88    Temp 97.9 °F (36.6 °C)    Resp 20    Ht 6' (1.829 m)    Wt 90.1 kg (198 lb 9.6 oz)    SpO2 95%    BMI 26.94 kg/m2     Abdomen: soft, non-tender. Bowel sounds normal. No masses,  no organomegaly    Data Review:    Labs: Results:       Chemistry Recent Labs      07/20/17   0505  07/19/17   0405  07/18/17   1352  07/18/17   0345   GLU  116*  150*  161*  133*   NA  142  147*  149*  149*   K  3.6  4.0  4.0  4.1   CL  111*  112*  115*  116*   CO2  22  22  21  21   BUN  73*  87*  95*  95*   CREA  2.78*  3.68*  4.46*  4.70*   CA  8.9  9.5  9.4  8.9   AGAP  9  13  13  12   BUCR  26*  24*  21*  20   AP  137*   --    --   169*   TP  8.3*   --    --   8.2   ALB  1.9*  1.9*   --   2.0*   GLOB  6.4*   --    --   6.2*   AGRAT  0.3*   --    --   0.3*      CBC w/Diff Recent Labs      07/20/17   0505  07/18/17   1352  07/18/17   0345   WBC  9.7  16.6*  15.7*   RBC  5.28  5.06  5.14   HGB  13.5  12.9*  13.2   HCT  43.9  42.0  42.5   PLT  251  283  277   GRANS  83*  87*  85*   LYMPH  7*  8*  5*   EOS  3  2  1      Coagulation No results for input(s): PTP, INR, APTT in the last 72 hours. No lab exists for component: INREXT    Liver Enzymes Recent Labs      07/20/17   0505   TP  8.3*   ALB  1.9*   AP  137*   SGOT  28   ALT  13*          Assessment:   1. Colonic pseudoobstruction:  Dilated colon with no obstructing lesion noted on ct scan. Improved with decompression. Agree with removal of NG tube and starting on liquids. Continue on Miralax. Recommendation:   1. Clear liquids today. 2. Can advance diet if doing well tomorrow.          Poli Pak MD  July 20, 2017

## 2017-07-20 NOTE — PROGRESS NOTES
Pharmacy adjusting dose for Levofloxacin (Levaquin) per renal protocol. Was on a regimen of: 500 mg IV q48h. Labs:   Serum Creatinine - 2.78 mg/dl  CrCl - 26.4 ml/min    Renal function improving    Plan:  Changed Levofloxacin to 750 mg IV q48h. Pharmacy to follow and will redose based on renal function changes. Thanks.

## 2017-07-20 NOTE — PROGRESS NOTES
RENAL PROGRESS NOTE        Aracelis Albright         Assessment/Plan:   · NEPTALI ( ischemic atn in a setting of uti/sepsis/gi bleeding). Non oliguric, scr is improving. Noted patient started on clear liquid diet. Continue IVF for now. · CKD 3 due to presumably dm/htn. · Hyperkalemia in context of neptali on ckd 3/use of aldactone. Resolved. · Hypernatremia . Improved Continue hypotonic ivf. · Staph aureus sepsis. Source? Abx per primary team.   · E. Coli uti. · HTN. Stable. · Systolic chf. No overt decompensation. · GI bleed/colonic pseudoobstruction. GI on the case. Please call with questions    Dheeraj Andrews MD FASN  Cell [phone]                                                                                                                        Subjective:    Hearing impaired  Overall improved   Started on clear liquid diet        Patient Active Problem List   Diagnosis Code    Acute upper GI bleed K92.2    NEPTALI (acute kidney injury) ([facility] N17.9    Febrile illness, acute R50.9    Lower GI bleed K92.2    Altered mental status R41.82    CKD (chronic kidney disease) stage 3, GFR 30-59 ml/min N18.3    Urinary tract infection without hematuria D86.8    Systolic CHF, chronic (HCC) I50.22       Current Facility-Administered Medications   Medication Dose Route Frequency Provider Last Rate Last Dose    [START ON 7/21/2017] ceFAZolin (ANCEF) 1 g in 0.9% sodium chloride (MBP/ADV) 50 mL MBP  1 g IntraVENous Q12H CLINTON Velasco MD        polyethylene glycol [facility]) packet 17 g  17 g Oral DAILY Kita Farnsworth MD   Stopped at 07/19/17 1700    dextrose 5% infusion  50 mL/hr IntraVENous CONTINUOUS Noel PAZ MD 50 mL/hr at 07/20/17 0642 50 mL/hr at 07/20/17 0642    acetaminophen (TYLENOL) tablet 650 mg  650 mg Oral Q4H PRN Malick Gay, DO        glucose chewable tablet 16 g  4 Tab Oral PRN Malick Gay, DO        glucagon (GLUCAGEN) injection 1 mg  1 mg IntraMUSCular PRN Alonzo Davalos DO        dextrose (D50W) injection syrg 12.5-25 g  25-50 mL IntraVENous PRN Alonzo Davalos DO        insulin lispro (HUMALOG) injection   SubCUTAneous Q6H Alonzo Davalos DO   Stopped at 07/19/17 1800    pantoprazole (PROTONIX) 40 mg in sodium chloride 0.9 % 10 mL injection  40 mg IntraVENous DAILY Jose Elias Jalloh MD   40 mg at 07/20/17 1042    dilTIAZem (CARDIZEM) 100 mg in 0.9% sodium chloride (MBP/ADV) 100 mL infusion  0-15 mg/hr IntraVENous TITRATE Ally Yates MD   Stopped at 07/18/17 2153    sodium chloride (NS) flush 5-10 mL  5-10 mL IntraVENous PRN Ming Clark MD        0.9% sodium chloride infusion 250 mL  250 mL IntraVENous PRN Ming Clark MD        latanoprost (XALATAN) 0.005 % ophthalmic solution 1 Drop  1 Drop Both Eyes QHS Ally Yates MD   1 Drop at 07/18/17 2141    nitroglycerin (NITRODUR) 0.4 mg/hr patch 1 Patch  1 Patch TransDERmal DAILY Ally Yates MD   1 Patch at 07/20/17 1042    brimonidine (ALPHAGAN) 0.2 % ophthalmic solution 1 Drop  1 Drop Both Eyes BID Ally Yates MD   1 Drop at 07/19/17 0810    dorzolamide-timolol (COSOPT) 22.3-6.8 mg/mL ophthalmic solution 1 Drop  1 Drop Both Eyes BID Ally Yates MD   1 Drop at 07/19/17 0810    senna (SENOKOT) tablet 8.6 mg  1 Tab Oral BID Ally Yates MD   8.6 mg at 07/20/17 1700    sodium chloride (NS) flush 5-10 mL  5-10 mL IntraVENous Q8H Ally Yates MD   10 mL at 07/20/17 1308    sodium chloride (NS) flush 5-10 mL  5-10 mL IntraVENous PRN Ally Yates MD        ondansetron [facility]) injection 4 mg  4 mg IntraVENous Q6H PRN Ally Yates MD        0.9% sodium chloride infusion  75 mL/hr IntraVENous PRN Ally Yates MD           Objective  Vitals:    07/20/17 0340 07/20/17 0658 07/20/17 0930 07/20/17 1400   BP:  124/69 139/75 122/63   Pulse:  99 92 88   Resp:  18 22 20   Temp:  97.4 °F (36.3 °C) 97.9 °F (36.6 °C) 97.9 °F (36.6 °C)   SpO2:  100% 98% 95%   Weight: 90.3 kg (199 lb 1.2 oz) 90.1 kg (198 lb 9.6 oz)     Height:             Intake/Output Summary (Last 24 hours) at 07/20/17 1749  Last data filed at 07/20/17 1742   Gross per 24 hour   Intake          4265.83 ml   Output             1825 ml   Net          2440.83 ml           Admission weight: Weight: 89.4 kg (197 lb) (07/15/17 1530)  Last Weight Metrics:  Weight Loss Metrics 7/20/2017 2/17/2017 3/15/2016   Today's Wt 198 lb 9.6 oz 205 lb 1.6 oz 223 lb   BMI 26.94 kg/m2 29.43 kg/m2 30.24 kg/m2             Physical Assessment:     General: NAD. Dry oral mucosa. Neck: No jvd. LUNGS: Clear to Auscultation, No rales, rhonchi or wheezes. CVS EXM: S1, S2  RRR, no murmurs/gallops/rubs. Abdomen: soft, non tender, slightly distended, pos bs. Lower Extremities:  trace edema.        Lab    CBC w/Diff Recent Labs      07/20/17   0505  07/18/17   1352  07/18/17   0345   WBC  9.7  16.6*  15.7*   RBC  5.28  5.06  5.14   HGB  13.5  12.9*  13.2   HCT  43.9  42.0  42.5   PLT  251  283  277   GRANS  83*  87*  85*   LYMPH  7*  8*  5*   EOS  3  2  1        Chemistry Recent Labs      07/20/17   0505  07/19/17   0405  07/18/17   1352  07/18/17   0345   GLU  116*  150*  161*  133*   NA  142  147*  149*  149*   K  3.6  4.0  4.0  4.1   CL  111*  112*  115*  116*   CO2  22  22  21  21   BUN  73*  87*  95*  95*   CREA  2.78*  3.68*  4.46*  4.70*   CA  8.9  9.5  9.4  8.9   AGAP  9  13  13  12   BUCR  26*  24*  21*  20   AP  137*   --    --   169*   TP  8.3*   --    --   8.2   ALB  1.9*  1.9*   --   2.0*   GLOB  6.4*   --    --   6.2*   AGRAT  0.3*   --    --   0.3*   PHOS   --   4.8   --    --          Lab Results   Component Value Date/Time    Iron 15 07/17/2017 03:50 AM    TIBC 181 07/17/2017 03:50 AM    Iron % saturation 8 07/17/2017 03:50 AM    Ferritin 12 03/15/2016 09:50 AM      Lab Results   Component Value Date/Time    Calcium 8.9 07/20/2017 05:05 AM    Phosphorus 4.8 07/19/2017 04:05 AM

## 2017-07-20 NOTE — ROUTINE PROCESS
1900 Assumed care of patient. Pt alert, nonverbal- and grunts/gestures for communication. VSS. 3 PIV's CDI. D5 @ 50 and 1/2 NS @ 100 mL/hr maintenance fluids infusing. Bell in place. Pt in no distress on 2L NC. No visual signs of pain at this time. Bed in low locked position and call bell within reach. 2000  Shift assessment completed. FMS removed order MD order. 2100 Hourly rounding. Evening meds given. IVF changed. 0000 Blood sugar drawn, 146. No coverage administered. 0000- 0600 Hourly rounding. Pt reassessed, resting quietly with no c/o of pain at this time. 0600  Blood sugar drawn, 137. No coverage necessary. Bell emptied- 1350 mL of clear claudia urine recorded. IVF bag changed. 0700 Bedside shift change report given to Vini Stokes (oncoming nurse) by Yumiko Worthington RN     (offgoing nurse). Report included the following information SBAR, Kardex, Intake/Output, MAR and Recent Results.

## 2017-07-20 NOTE — CONSULTS
Leonidas Darnello    Name:  Alfred Cortés  MR#:  [id number]  :  1945  Account #:  [de-identified]  Date of Adm:  07/15/2017  Date of Consultation:  2017      INFECTIOUS DISEASE CONSULTATION    ATTENDING PHYSICIAN REQUESTING CONSULTATION: Dr. Shoaib Jose: [facility]/[address], [phone]. REASON FOR CONSULTATION: Staphylococcus bacteremia. IMPRESSION, PROBLEM    1. High-grade methicillin-sensitive Staphylococcus aureus bloodstream  invasion. The patient had 2 of 2 blood cultures positive on admission  07/15 for MSSA; also repeat blood cultures  two sets, and one  from the  and one of 2 from the  are growing MSSA. Two  echocardiograms both transthoracic this admission have not  demonstrated vegetation but significant cardiomyopathy with EF of  0.15 to 0.20. However, with 5 of 6 positive blood cultures there is high  level of suspicion for endovascular infection here. The patient does  complain of right shoulder and right knee pain. He may have a chronic  effusion of the right knee but it is not hot or erythematous, and he has  no obvious soft tissue swelling of the right shoulder although there is  some posterior point tenderness. He does not indicate back pain and I  find no reproducible back tenderness on examination. Chest x-ray and  CT of abdomen and pelvis were unrevealing for source. He does not  have evidence an obvious diabetic foot infection and his urine culture  on admission grew Escherichia coli. 2. Escherichia coli urinary tract infection, treated. Admission urinalysis  showed small leukocyte esterase activity with 4-6 white cells on  microscopic, and urine culture grew more than 100,000 E coli resistant  only to ampicillin. He was initially treated with 3 days of ceftriaxone,  has been on Levaquin for the past 2 days, and repeat urine culture  was negative. I think it is treated at this point.   3. Sepsis with temperature of 102.5 on 07/16, and white count the  same day of 14,400--suspect due to staph aureus bloodstream  invasion. 4. Congestive heart failure with global cardiomyopathy, ejection  fraction 0.15 to 0.20 this admission. 5. Guaiac-positive stool on admission, with patient being on Coumadin  and improved colonic distention, being followed by Gastroenterology. 6. Hypertension. 7. Acute kidney injury on chronic kidney disease with creatinine of 2.8  today, peak 4.7 on 07/18, was 2.4 on admission and 2.6 on 02/17. 8. Diabetes mellitus type 2 with hemoglobin A1c 6.5.  9. Deaf mute. 10. History of gout. 11. Increased globulin fraction with total protein 8.3, albumin 1.9,  concerning for either chronic inflammation with polyclonal gammopathy  or possible monoclonal process here. RECOMMENDATIONS  1. Repeat blood cultures x2 today. 2. Change Levaquin to cefazolin 2 g initial dose then 1 g IV every 12  hours for 2 g every 8 hours dosing equivalent with EGFR less than 35.  3. Plain films of right knee and shoulder with low threshold for  orthopedic evaluation or nuclear medicine scan depending on findings  and persistence. 4. If further positive blood cultures, consider transesophageal  echocardiogram though patient will likely, in any event, need full 4- to  6-week course of therapy and, with his other medical issues, may not  be a good candidate for valvular surgery. 5. Monitor for continued improvement in sepsis on antibiotics. 6. Management of guaiac-positive stool and colonic distention as per  GI.  7. Check SPEP. Thank you for this consultation. Dr. Carmen Matos, Dr. Raheem Celeste, Dr. Eric Herrmann, and I will follow with you. HISTORY OF PRESENT ILLNESS: The patient is a 63-year-old,  single, black male, a [place] resident with history of hypertension,  diabetes mellitus, gout, CHF, and deaf-mutism, as well as chronic  kidney disease. He was brought to the emergency room on 07/15 for  diaphoresis and altered mental status.  His INR was 2.3. His urinalysis  showed 4-6 white cells. He was initially afebrile but tachycardic. He  was found to have guaiac-positive stools and a markedly low  hematocrit of 21. He was transfused 2 packed blood cell units and was  noted to have marked increase in hematocrit, leading to questioning of  the initial CBC. The patient, also the following day, had fever to 102.5  with elevated white count and positive blood cultures from admission  for gram-positive cocci. He had initially been started on ceftriaxone for  urinary tract infection with urine culture growing Escherichia coli which  was resistant only to ampicillin. Vancomycin was added to his antibiotic  coverage on the 17th and repeat blood cultures were again obtained  and positive. The patient had to be transferred to the intensive care  unit for tachycardia, hyperkalemia, shortness of breath, and  subsequently developed abdominal distention. This was treated  conservatively with NG tube decompression and CT performed  revealed no bowel or other pathology. Echocardiogram demonstrated  a low EF of 0.15, and this was repeated looking specifically for  vegetation transthoracically with EF then of 0.20 and no valvular  vegetations were reported. The patient had another positive blood  culture for Staphylococcus aureus from 07/18 and today Dr. Mike Mccloud  requested Infectious Disease consultation. Of note, Levaquin  was started on the 18th and vancomycin was discontinued yesterday. The patient is a challenging historian. He consistently reported right  shoulder pain, although the duration is unclear. He also, on further  questioning, reported right knee discomfort although this may be  chronic. I could not obtain a clear history of any back pain, although  the patient did again report some abdominal discomfort apparently  related to his previous bowel distention. This pain, he indicated, was  improved.     PAST MEDICAL HISTORY  MEDICAL ILLNESSES: Hypertension, diabetes mellitus, gout, CHF,  deaf mute, nonverbal, chronic kidney disease stage III. SURGERIES: None recorded. ALLERGIES: NO KNOWN DRUG ALLERGIES. MEDICATIONS  1. Eyedrops. 2. Insulin. 3. Levaquin. 4. Nitro-Dur.  5. Protonix. FAMILY HISTORY: Unable to elicit. SOCIAL HISTORY: The patient is a [facility] resident, single, does  not consume tobacco.    REVIEW OF SYSTEMS: Limited by the patient's deafness and current  condition. Full review systems was attempted. HEENT: Negative for acute headache. PULMONARY: Negative for chest pain or shortness of breath. CARDIOVASCULAR: Negative for chest pain. GASTROINTESTINAL: See HPI. GENITOURINARY: The patient denies current burning on micturition. NEUROMUSCULOSKELETAL: See HPI. PHYSICAL EXAMINATION  GENERAL: He is an awake, alert, black male lying in bed in no acute  distress. VITAL SIGNS: Temperature max 98.8, temperature 97.9, pulse 88,  blood pressure 122/63, respiratory rate 20, oxygen saturation is 95%. HEENT: Sclerae anicteric, conjunctivae pink. Oropharynx with poor  dentition. No ulceration. No thrush. NECK: Supple, without lymphadenopathy or thyromegaly. CHEST: Clear to auscultation and percussion. CARDIOVASCULAR: Diffuse PMI. Regular rate and rhythm. No loud  murmur, perhaps 1/6 at the left lower sternal border. ABDOMEN: Soft and nontender, without hepatosplenomegaly,  masses, CVA or suprapubic tenderness. BACK: Nontender to palpation along the cervical, thoracic and lumbar  spine. EXTREMITIES: 1+ ankle edema. The right knee appears to have  synovial thickening and may have an effusion. It is not warm but the  patient does report it is tender compared with the left. He does have  point tenderness of the right shoulder in the scapular region, although  there is no obvious shoulder effusion. Did also report pain with passive  range of motion, specifically abduction at the shoulder. SKIN: No acute rash.   NEUROLOGIC: Patient awake and appropriate. He is reportedly a deaf  mute and reads lips. LABORATORIES: KUB 07/20: Decreased distention. Chest x-ray: No  acute disease. CT abdomen and pelvis 07/16: No acute abdominal or  pelvic pathology, incidental cholelithiasis. Sodium 142, potassium 3.6,  chloride 111, bicarbonate 22, glucose 116, BUN 73, creatinine 2.8. AST 28, ALT 13, alkaline phosphatase 137, bilirubin 0.8. Total protein  is elevated at 8.3. Albumin is 1.9. Hemoglobin A1c 6.5. INR 1.5. White  count 9700, hematocrit 44, platelet count 447. Differential 83 polys, 7  lymphs, 7 monos, 3 eos. A 07/15 blood culture x2: MSSA. 07/17 blood  cultures x2: MSSA. 07/18 blood culture: One of two MSSA. 07/16 urine  culture: No growth. 07/15 urine culture: More than 100,000 E coli  sensitive to all except ampicillin. 07/15 urinalysis: 0-3 white cells, 2+  bacteria.         Vimal Dumont MD    [facility]  D:  07/20/2017   15:21  T:  07/20/2017   17:33  Job #:  [id number]

## 2017-07-20 NOTE — PROGRESS NOTES
Speech Therapy Note:    SLP provided set of communication boards to patient 2/2 pt being deaf and difficulty communicating. Pt able to ID (via signs) pictures from board of >12 pictures. Rossana A. Meryle Pollock., [address] [facility]  Office: [phone]  Pager: [phone]

## 2017-07-20 NOTE — PROGRESS NOTES
Progress Note      Patient: José Miguel Reed               Sex: male          DOA: 7/15/2017       YOB: 1945      Age:  67 y.o.        LOS:  LOS: 5 days               Subjective:   Pt has had a 2 d echo and his ef is 15 % he does not seem to be in failure at the present time . As per gi note  The pt's  Ct scan shows that the  colon has undergone significant decompression . The pt's abdomen is presently soft and nontender to palpation and he has bowel sounds . The kub today shows mild colonic dilation . His troponin is 0.13. it was 0.24 in the past couple of days . The creatinine is improving to 3.68.pt pulled out his ng tube     Very hard of hearing, lethargic    Objective:      Visit Vitals    /63    Pulse 88    Temp 97.9 °F (36.6 °C)    Resp 20    Ht 6' (1.829 m)    Wt 90.1 kg (198 lb 9.6 oz)    SpO2 95%    BMI 26.94 kg/m2       Physical Exam:  Pt is a deaf mute . reads lips however. Heart reg rate and rhythm   Lungs good breath sounds heard   Abdomen soft bowel sounds heard   Neuro moves all extremities .      Lab/Data Reviewed:  CMP:   Lab Results   Component Value Date/Time     07/20/2017 05:05 AM    K 3.6 07/20/2017 05:05 AM     (H) 07/20/2017 05:05 AM    CO2 22 07/20/2017 05:05 AM    AGAP 9 07/20/2017 05:05 AM     (H) 07/20/2017 05:05 AM    BUN 73 (H) 07/20/2017 05:05 AM    CREA 2.78 (H) 07/20/2017 05:05 AM    GFRAA 27 (L) 07/20/2017 05:05 AM    GFRNA 23 (L) 07/20/2017 05:05 AM    CA 8.9 07/20/2017 05:05 AM    ALB 1.9 (L) 07/20/2017 05:05 AM    TP 8.3 (H) 07/20/2017 05:05 AM    GLOB 6.4 (H) 07/20/2017 05:05 AM    AGRAT 0.3 (L) 07/20/2017 05:05 AM    SGOT 28 07/20/2017 05:05 AM    ALT 13 (L) 07/20/2017 05:05 AM     CBC:   Lab Results   Component Value Date/Time    WBC 9.7 07/20/2017 05:05 AM    HGB 13.5 07/20/2017 05:05 AM    HCT 43.9 07/20/2017 05:05 AM     07/20/2017 05:05 AM           Assessment/Plan     Principal Problem:    Lower GI bleed (7/15/2017)    Active Problems:    Altered mental status (7/15/2017)      CKD (chronic kidney disease) stage 3, GFR 30-59 ml/min (7/15/2017)      Urinary tract infection without hematuria (4/49/6650)      Systolic CHF, chronic ([facility] [address] (7/18/2017)        Plan:    NEPTALI on chronic CKD-- Improving with time, continue slow IVF, no directics for now  Chronic Systolic HF  Colonic pseudoobstruction- s/p NGT , improving, clears today  Staph aureus bacteremia-  Unclear source sensitive to levaquin.   ID consult continue IVABX

## 2017-07-21 ENCOUNTER — APPOINTMENT (OUTPATIENT)
Dept: ULTRASOUND IMAGING | Age: 72
DRG: 377 | End: 2017-07-21
Attending: HOSPITALIST
Payer: MEDICARE

## 2017-07-21 LAB
ALBUMIN SERPL BCP-MCNC: 1.7 G/DL (ref 3.4–5)
ANION GAP BLD CALC-SCNC: 11 MMOL/L (ref 3–18)
APPEARANCE FLD: ABNORMAL
BACTERIA SPEC CULT: ABNORMAL
BUN SERPL-MCNC: 57 MG/DL (ref 7–18)
BUN/CREAT SERPL: 26 (ref 12–20)
CALCIUM SERPL-MCNC: 8.9 MG/DL (ref 8.5–10.1)
CHLORIDE SERPL-SCNC: 109 MMOL/L (ref 100–108)
CO2 SERPL-SCNC: 24 MMOL/L (ref 21–32)
COLOR FLD: ABNORMAL
CREAT SERPL-MCNC: 2.16 MG/DL (ref 0.6–1.3)
EOSINOPHIL # FLD: 0 %
GLUCOSE BLD STRIP.AUTO-MCNC: 108 MG/DL (ref 70–110)
GLUCOSE BLD STRIP.AUTO-MCNC: 128 MG/DL (ref 70–110)
GLUCOSE BLD STRIP.AUTO-MCNC: 152 MG/DL (ref 70–110)
GLUCOSE BLD STRIP.AUTO-MCNC: 213 MG/DL (ref 70–110)
GLUCOSE SERPL-MCNC: 126 MG/DL (ref 74–99)
GRAM STN SPEC: ABNORMAL
LYMPHOCYTES NFR FLD: 2 %
MACROPHAGES NFR FLD: 3 %
MONOCYTES NFR FLD: 23 %
NEUTS BAND # FLD: 0 %
NEUTS SEG NFR FLD: 72 %
NUC CELL # FLD: 4588 /CU MM
PHOSPHATE SERPL-MCNC: 3.4 MG/DL (ref 2.5–4.9)
POTASSIUM SERPL-SCNC: 3.4 MMOL/L (ref 3.5–5.5)
RBC # FLD: ABNORMAL /CU MM
SERVICE CMNT-IMP: ABNORMAL
SERVICE CMNT-IMP: ABNORMAL
SODIUM SERPL-SCNC: 144 MMOL/L (ref 136–145)
SPECIMEN SOURCE FLD: ABNORMAL

## 2017-07-21 PROCEDURE — 74011250636 HC RX REV CODE- 250/636: Performed by: INTERNAL MEDICINE

## 2017-07-21 PROCEDURE — 77010033678 HC OXYGEN DAILY

## 2017-07-21 PROCEDURE — 87070 CULTURE OTHR SPECIMN AEROBIC: CPT | Performed by: HOSPITALIST

## 2017-07-21 PROCEDURE — 36415 COLL VENOUS BLD VENIPUNCTURE: CPT | Performed by: INTERNAL MEDICINE

## 2017-07-21 PROCEDURE — 74011250637 HC RX REV CODE- 250/637: Performed by: HOSPITALIST

## 2017-07-21 PROCEDURE — 80069 RENAL FUNCTION PANEL: CPT | Performed by: INTERNAL MEDICINE

## 2017-07-21 PROCEDURE — 74011250637 HC RX REV CODE- 250/637: Performed by: INTERNAL MEDICINE

## 2017-07-21 PROCEDURE — 89051 BODY FLUID CELL COUNT: CPT | Performed by: HOSPITALIST

## 2017-07-21 PROCEDURE — 82784 ASSAY IGA/IGD/IGG/IGM EACH: CPT | Performed by: INTERNAL MEDICINE

## 2017-07-21 PROCEDURE — 82962 GLUCOSE BLOOD TEST: CPT

## 2017-07-21 PROCEDURE — 74011000250 HC RX REV CODE- 250: Performed by: HOSPITALIST

## 2017-07-21 PROCEDURE — 0S9C3ZX DRAINAGE OF RIGHT KNEE JOINT, PERCUTANEOUS APPROACH, DIAGNOSTIC: ICD-10-PCS | Performed by: RADIOLOGY

## 2017-07-21 PROCEDURE — 77030020186 HC BOOT HL PROTCT SAGE -B

## 2017-07-21 PROCEDURE — 65660000000 HC RM CCU STEPDOWN

## 2017-07-21 PROCEDURE — C9113 INJ PANTOPRAZOLE SODIUM, VIA: HCPCS | Performed by: INTERNAL MEDICINE

## 2017-07-21 PROCEDURE — 74011636637 HC RX REV CODE- 636/637: Performed by: HOSPITALIST

## 2017-07-21 PROCEDURE — 74011000258 HC RX REV CODE- 258: Performed by: INTERNAL MEDICINE

## 2017-07-21 PROCEDURE — 76942 ECHO GUIDE FOR BIOPSY: CPT

## 2017-07-21 PROCEDURE — 74011000250 HC RX REV CODE- 250: Performed by: RADIOLOGY

## 2017-07-21 PROCEDURE — 74011000250 HC RX REV CODE- 250: Performed by: INTERNAL MEDICINE

## 2017-07-21 RX ORDER — PANTOPRAZOLE SODIUM 40 MG/1
40 TABLET, DELAYED RELEASE ORAL DAILY
Status: DISCONTINUED | OUTPATIENT
Start: 2017-07-22 | End: 2017-07-28 | Stop reason: HOSPADM

## 2017-07-21 RX ORDER — CEFAZOLIN SODIUM 2 G/50ML
2 SOLUTION INTRAVENOUS EVERY 8 HOURS
Status: DISCONTINUED | OUTPATIENT
Start: 2017-07-21 | End: 2017-07-27

## 2017-07-21 RX ORDER — POTASSIUM CHLORIDE AND SODIUM CHLORIDE 450; 150 MG/100ML; MG/100ML
INJECTION, SOLUTION INTRAVENOUS CONTINUOUS
Status: DISCONTINUED | OUTPATIENT
Start: 2017-07-21 | End: 2017-07-26

## 2017-07-21 RX ORDER — LIDOCAINE HYDROCHLORIDE 10 MG/ML
9 INJECTION INFILTRATION; PERINEURAL
Status: COMPLETED | OUTPATIENT
Start: 2017-07-21 | End: 2017-07-21

## 2017-07-21 RX ADMIN — DORZOLAMIDE HYDROCHLORIDE AND TIMOLOL MALEATE 1 DROP: 20; 5 SOLUTION/ DROPS OPHTHALMIC at 08:36

## 2017-07-21 RX ADMIN — DEXTROSE MONOHYDRATE 50 ML/HR: 5 INJECTION, SOLUTION INTRAVENOUS at 06:19

## 2017-07-21 RX ADMIN — SENNOSIDES 8.6 MG: 8.6 TABLET, FILM COATED ORAL at 08:33

## 2017-07-21 RX ADMIN — Medication 10 ML: at 15:16

## 2017-07-21 RX ADMIN — DORZOLAMIDE HYDROCHLORIDE AND TIMOLOL MALEATE 1 DROP: 20; 5 SOLUTION/ DROPS OPHTHALMIC at 18:18

## 2017-07-21 RX ADMIN — INSULIN LISPRO 3 UNITS: 100 INJECTION, SOLUTION INTRAVENOUS; SUBCUTANEOUS at 12:00

## 2017-07-21 RX ADMIN — BRIMONIDINE TARTRATE 1 DROP: 2 SOLUTION OPHTHALMIC at 08:28

## 2017-07-21 RX ADMIN — SODIUM BICARBONATE 1 ML: 0.2 INJECTION, SOLUTION INTRAVENOUS at 16:24

## 2017-07-21 RX ADMIN — POLYETHYLENE GLYCOL 3350 17 G: 17 POWDER, FOR SOLUTION ORAL at 08:33

## 2017-07-21 RX ADMIN — Medication 10 ML: at 23:04

## 2017-07-21 RX ADMIN — SENNOSIDES 8.6 MG: 8.6 TABLET, FILM COATED ORAL at 18:15

## 2017-07-21 RX ADMIN — CEFAZOLIN SODIUM 2 G: 2 SOLUTION INTRAVENOUS at 15:12

## 2017-07-21 RX ADMIN — LATANOPROST 1 DROP: 50 SOLUTION OPHTHALMIC at 23:04

## 2017-07-21 RX ADMIN — CEFAZOLIN SODIUM 2 G: 2 SOLUTION INTRAVENOUS at 22:57

## 2017-07-21 RX ADMIN — SODIUM CHLORIDE 40 MG: 9 INJECTION INTRAMUSCULAR; INTRAVENOUS; SUBCUTANEOUS at 08:30

## 2017-07-21 RX ADMIN — SODIUM CHLORIDE AND POTASSIUM CHLORIDE: 4.5; 1.49 INJECTION, SOLUTION INTRAVENOUS at 16:59

## 2017-07-21 RX ADMIN — INSULIN LISPRO 6 UNITS: 100 INJECTION, SOLUTION INTRAVENOUS; SUBCUTANEOUS at 18:15

## 2017-07-21 RX ADMIN — BRIMONIDINE TARTRATE 1 DROP: 2 SOLUTION OPHTHALMIC at 18:15

## 2017-07-21 RX ADMIN — LIDOCAINE HYDROCHLORIDE 9 ML: 10 INJECTION, SOLUTION INFILTRATION; PERINEURAL at 16:23

## 2017-07-21 RX ADMIN — CEFAZOLIN SODIUM 1 G: 1 INJECTION, POWDER, FOR SOLUTION INTRAMUSCULAR; INTRAVENOUS at 03:50

## 2017-07-21 RX ADMIN — Medication 10 ML: at 05:19

## 2017-07-21 NOTE — PROGRESS NOTES
Infectious Disease Follow-up     Admit Date: 7/15/2017    N.B. We will plan to be available over weekend and to f/u Monday, 7/24    ->please call Dr. Tamar Kumari (on-call) at [phone] if any problem or question for ID prior. Current abx Prior abx   Cefazolin 7/20-1 levaquin 7/18-2m ceftriaxone 7/15-3 vanco 7/17-2     Assessment ->Rec:     High grade MSSA BSI- POA 2 of 2 bctx's 7/15, also 7/17 and 7/18 2 of 2 sets;   -echo (TTE) neg x 2 for vegetation but with 5 of 6 positive concern for IE  -c/o R shoulder and R knee pain- may have chronic effusion R knee, no obvious STS R shoulder with post point tenderness, no reported back pain, no reproducible tenderness   -cxr ctap neg, feet ok  -NEW large R knee effusion ->monitor repeat bctx's from 7/20- so far ngtd  ->Cr better eGFR AA >35 so increase cefazolin to 2 q8   ->if 7/20 bctx's positive, suggest TONYA though may not alter therapy as will need full 4-6 week course of treatment and d/w Dr. Gloria Pina today doubt good surgical candidate with low EF multiple other comorbidities  ->hold on picc until clear bsi   R knee effusion- c/o pain though min warmth c/w L   -? Gout though with SABSI rec tap to exclude seeding ->would ask radiol to aspirate for GS, culture, cell count and crystal analysis   -d/w Dr. Gloria Pina today    R shoulder pain- plain film s/o rotator cuff tear ->monitor    E coli UTI -ua adm sm francis est 4-6 wbc grew >100k E coli R amp -- initially treated ceftriaxone x 3 d, on levaquin, repeat uctx ng -isolate S cefazolin but consider treated at this point   Sepsis with T 102.5 7/16, wbc 14.4 7/16- suspect d/t SA BSI ->monitor on abx    chf with  EF 0.15-0.20 this adm    guaic pos stool on admission on coumadin, improved colonic distention  ->per GI   HTN    NEPTALI on CKD3  Cr 2.2 today,  peak 4.7 7/18, was 2.6 2/17, 2.4 on adm -d/w Dr. Gloria Pina today   DM2 A1c 6.5    Deaf mute     H/o gout     Increased globulin TP 8.3 alb 1.9 ?  Chronic inflammation vs monoclonal  ->await spep     MICROBIOLOGY:   7/15  bctx x 2 mssa   uctx >100k E coli R amp  7/16 uctx ng  7/17 bctx x 2 mssa  7/18 bctx x 2 mssa  7/20 bctx x 2 ngtd    LINES AND CATHETERS:   piv     Active Hospital Problems    Diagnosis Date Noted    Systolic CHF, chronic ([address] 07/18/2017    Lower GI bleed 07/15/2017    Altered mental status 07/15/2017    CKD (chronic kidney disease) stage 3, GFR 30-59 ml/min 07/15/2017    Urinary tract infection without hematuria 07/15/2017         Subjective: Interval notes reviewed, d/w Dr. Sabino Garcia Cr better today. Yesterday c/o R knee and shoulder pains, shoulder xray s/o rotator cuff tear, knee with large effusion. Not very TTP today but d/w Dr. Sabino Garcia would ask IR to aspirate for GS cultures and crystal analysis, cell count. Pt drowsy today, no adr reported, no fever, additional bctx from 7/18 now positive for SA, informed him repeat ngtd.       Current Facility-Administered Medications   Medication Dose Route Frequency    0.45% sodium chloride with KCl 20 mEq/L infusion   IntraVENous CONTINUOUS    ceFAZolin (ANCEF) 1 g in 0.9% sodium chloride (MBP/ADV) 50 mL MBP  1 g IntraVENous Q12H    polyethylene glycol (MIRALAX) packet 17 g  17 g Oral DAILY    acetaminophen (TYLENOL) tablet 650 mg  650 mg Oral Q4H PRN    glucose chewable tablet 16 g  4 Tab Oral PRN    glucagon (GLUCAGEN) injection 1 mg  1 mg IntraMUSCular PRN    dextrose (D50W) injection syrg 12.5-25 g  25-50 mL IntraVENous PRN    insulin lispro (HUMALOG) injection   SubCUTAneous Q6H    pantoprazole (PROTONIX) 40 mg in sodium chloride 0.9 % 10 mL injection  40 mg IntraVENous DAILY    dilTIAZem (CARDIZEM) 100 mg in 0.9% sodium chloride (MBP/ADV) 100 mL infusion  0-15 mg/hr IntraVENous TITRATE    sodium chloride (NS) flush 5-10 mL  5-10 mL IntraVENous PRN    0.9% sodium chloride infusion 250 mL  250 mL IntraVENous PRN    latanoprost (XALATAN) 0.005 % ophthalmic solution 1 Drop  1 Drop Both Eyes QHS  nitroglycerin (NITRODUR) 0.4 mg/hr patch 1 Patch  1 Patch TransDERmal DAILY    brimonidine (ALPHAGAN) 0.2 % ophthalmic solution 1 Drop  1 Drop Both Eyes BID    dorzolamide-timolol (COSOPT) 22.3-6.8 mg/mL ophthalmic solution 1 Drop  1 Drop Both Eyes BID    senna (SENOKOT) tablet 8.6 mg  1 Tab Oral BID    sodium chloride (NS) flush 5-10 mL  5-10 mL IntraVENous Q8H    sodium chloride (NS) flush 5-10 mL  5-10 mL IntraVENous PRN    ondansetron (ZOFRAN) injection 4 mg  4 mg IntraVENous Q6H PRN    0.9% sodium chloride infusion  75 mL/hr IntraVENous PRN         Objective:     Visit Vitals    /67    Pulse 92    Temp 98 °F (36.7 °C)    Resp 20    Ht 6' (1.829 m)    Wt 89.2 kg (196 lb 10.4 oz)    SpO2 97%    BMI 26.67 kg/m2       Temp (24hrs), Av.3 °F (36.8 °C), Min:97.5 °F (36.4 °C), Max:99.1 °F (37.3 °C)    GEN: drowsy elderly BM lying in bed NAD, rousable, non-verbal   HEENT: carious teeth  NECK: supple no LAD trach midline  CHEST: no rales rhonchi or wheeze  CVS:diffuse PMI, I/VI murmur LLSB, RRR   ABD: soft non-tender (+) BS no organomegaly maher yellow urine  EXT: R knee effusion mild warmth  SKIN: no rash     Labs: Results:   Chemistry Recent Labs      17   0525  17   0505  17   0405   GLU  126*  116*  150*   NA  144  142  147*   K  3.4*  3.6  4.0   CL  109*  111*  112*   CO2  24  22  22   BUN  57*  73*  87*   CREA  2.16*  2.78*  3.68*   CA  8.9  8.9  9.5   AGAP  11  9  13   BUCR  26*  26*  24*   AP   --   137*   --    TP   --   8.3*   --    ALB  1.7*  1.9*  1.9*   GLOB   --   6.4*   --    AGRAT   --   0.3*   --       CBC w/Diff Recent Labs      17   0505  17   1352   WBC  9.7  16.6*   RBC  5.28  5.06   HGB  13.5  12.9*   HCT  43.9  42.0   PLT  251  283   GRANS  83*  87*   LYMPH  7*  8*   EOS  3  2        R knee IMPRESSION:     1. No acute osseous finding.     2. Large joint effusion. R shoulder IMPRESSION:     1. No acute osseous finding.     2.  Degenerative changes as above with pronounced inferior acromial spurring,  usually associated with rotator cuff tear    Microbiology Results  Recent Labs      07/20/17   1537  07/20/17   1532  07/18/17   2100   CULT  NO GROWTH AFTER 15 HOURS  NO GROWTH AFTER 15 HOURS  AEROBIC BOTTLE STAPHYLOCOCCUS AUREUSWinifred Chavez MD  July 21, 2017  [facility] AT THE [facility]  [phone]

## 2017-07-21 NOTE — PROGRESS NOTES
Gastrointestinal Progress Note    Patient Name: Murray Francisco    AZNOH'I Date: 7/21/2017    Admit Date: 7/15/2017    Subjective:     Murray Francisco is a 67 y.o. Male with heme positive stools but no active bleeding noted. He was found to have HGB of 6 but then appears to have over corrected and the initial lab may have been error. His HGB has remained stable since admission. He has no bleeding noted. He developed pseudoobstruction. CT noted no obstructing lesions. NGT placed with good decompression. Started on Miralax. Imaging improved. He has had NGT removed and is doing well with no issues overnight.            Current Facility-Administered Medications   Medication Dose Route Frequency    0.45% sodium chloride with KCl 20 mEq/L infusion   IntraVENous CONTINUOUS    ceFAZolin (ANCEF) 2g IVPB in 50 mL D5W  2 g IntraVENous Q8H    polyethylene glycol (MIRALAX) packet 17 g  17 g Oral DAILY    acetaminophen (TYLENOL) tablet 650 mg  650 mg Oral Q4H PRN    glucose chewable tablet 16 g  4 Tab Oral PRN    glucagon (GLUCAGEN) injection 1 mg  1 mg IntraMUSCular PRN    dextrose (D50W) injection syrg 12.5-25 g  25-50 mL IntraVENous PRN    insulin lispro (HUMALOG) injection   SubCUTAneous Q6H    pantoprazole (PROTONIX) 40 mg in sodium chloride 0.9 % 10 mL injection  40 mg IntraVENous DAILY    dilTIAZem (CARDIZEM) 100 mg in 0.9% sodium chloride (MBP/ADV) 100 mL infusion  0-15 mg/hr IntraVENous TITRATE    sodium chloride (NS) flush 5-10 mL  5-10 mL IntraVENous PRN    0.9% sodium chloride infusion 250 mL  250 mL IntraVENous PRN    latanoprost (XALATAN) 0.005 % ophthalmic solution 1 Drop  1 Drop Both Eyes QHS    nitroglycerin (NITRODUR) 0.4 mg/hr patch 1 Patch  1 Patch TransDERmal DAILY    brimonidine (ALPHAGAN) 0.2 % ophthalmic solution 1 Drop  1 Drop Both Eyes BID    dorzolamide-timolol (COSOPT) 22.3-6.8 mg/mL ophthalmic solution 1 Drop  1 Drop Both Eyes BID    senna (SENOKOT) tablet 8.6 mg  1 Tab Oral BID    sodium chloride (NS) flush 5-10 mL  5-10 mL IntraVENous Q8H    sodium chloride (NS) flush 5-10 mL  5-10 mL IntraVENous PRN    ondansetron (ZOFRAN) injection 4 mg  4 mg IntraVENous Q6H PRN    0.9% sodium chloride infusion  75 mL/hr IntraVENous PRN          Objective:     Physical Exam:    Visit Vitals    /70    Pulse (!) 102    Temp 97.5 °F (36.4 °C)    Resp 18    Ht 6' (1.829 m)    Wt 89.2 kg (196 lb 10.4 oz)    SpO2 99%    BMI 26.67 kg/m2     Abdomen: soft, non-tender. Bowel sounds normal. No masses,  no organomegaly    Data Review:    Labs: Results:       Chemistry Recent Labs      07/21/17   0525  07/20/17   0505  07/19/17   0405   GLU  126*  116*  150*   NA  144  142  147*   K  3.4*  3.6  4.0   CL  109*  111*  112*   CO2  24  22  22   BUN  57*  73*  87*   CREA  2.16*  2.78*  3.68*   CA  8.9  8.9  9.5   AGAP  11  9  13   BUCR  26*  26*  24*   AP   --   137*   --    TP   --   8.3*   --    ALB  1.7*  1.9*  1.9*   GLOB   --   6.4*   --    AGRAT   --   0.3*   --       CBC w/Diff Recent Labs      07/20/17   0505   WBC  9.7   RBC  5.28   HGB  13.5   HCT  43.9   PLT  251   GRANS  83*   LYMPH  7*   EOS  3      Coagulation No results for input(s): PTP, INR, APTT in the last 72 hours. No lab exists for component: INREXT, INREXT    Liver Enzymes Recent Labs      07/21/17   0525  07/20/17   0505   TP   --   8.3*   ALB  1.7*  1.9*   AP   --   137*   SGOT   --   28   ALT   --   13*          Assessment:   1. Colonic pseudoobstruction:  Dilated colon with no obstructing lesion noted on ct scan. Improved with decompression. Doing well since NG tube removed. Tolerating diet. Will advance diet. 2. Heme positive stool:  He had heme positive stool. No active bleeding and stable H/H. Recommend colonoscopy as outpatient. Recommendation:   1. Advance diet. 2. Recommend colonoscopy as an outpatient. 3. Will sign off for now, please call with any questions.          Gabriella Madrid, MD  July 21, 2017

## 2017-07-21 NOTE — ROUTINE PROCESS
0704 Report received from [address] [facility], [address] [place] [address]. Patient alert, denies any pain. Call bell placed within reach. 0830 AM meds given. Patient resting in the bed. Noted DTI to both outer feet. BLE elevated in prevalon boots. 1230 Patient resting in the bed.  1600 Patient off the floor for Ultrasound. 1655 Patient back in the room. IVF restarted. Noted dry dressing to right knee. 1815 Patient wants his HOB down. Meds given and then repositioned in the bed. SCD's applied to BLE and kept elevated in Prevalon boots. Denies any pain. 1930 Bedside and Verbal shift change report given to Jessica Melendez RN (oncoming nurse) by Sylvain Pitts RN (offgoing nurse). Report included the following information SBAR, Kardex, Intake/Output, MAR, Recent Results and Cardiac Rhythm NSR.

## 2017-07-21 NOTE — PROCEDURES
Vascular & Interventional Radiology Brief Procedure Note    Interventional Radiologist: Deb Higgins MD    Pre-operative Diagnosis:  Right knee effusion, MRSA bacteremia    Post-operative Diagnosis: Same as pre-op dx    Procedure(s) Performed:  Ultrasound guided right knee aspiration    Anesthesia:  Local     Findings:  Ultrasound guidance utilized. 23 cc of pink, thin non-odorous fluid were aspirated through 5 [place] Y[place]h sheath. Complications: None    Estimated Blood Loss:  minimal    Tubes and Drains: None    Specimens:  To lab for the requested testing    Condition: Unchanged    Disposition:  Inpatient    Deb Higgins MD, MD  [address] [address]  Vascular & Interventional Radiology  7/21/2017

## 2017-07-21 NOTE — PROGRESS NOTES
Chart reviewed. Plan remains return to . [facility] when medically stable. Will cont to follow for further needs. Jada Hare RN,ext. [phone].

## 2017-07-21 NOTE — PROGRESS NOTES
NUTRITION follow-up/Plan of care    RECOMMENDATIONS:     1. Full liquids; Advance diet when medically indicated/as tolerated  2. Ensure daily  3. Monitor weight, labs and PO intake  4. RD to follow    GOALS:     1. Ongoing: PO intake meets >75% of protein/calorie needs by 7/24  2. Met/Ongoing: Maintain weight (+/- 1-2 lb by 7/24)    ASSESSMENT:      Weight status is classified as overweight per BMI of 26.7. However, weight appropriate for age. Tolerating clear liquids. Labs noted. BG range from 113-185 over past 24 hours. Nutrition recommendations listed. Will monitor diet advancement. 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 Per GI, patient with colonic dilation, most suggestive of colonic pseudo-obstruction but improved. NG tube pulled by patient and not to be reinserted per MD. Hillery Beery clear liquids. Observed 90% intake of lunch tray. Noted order for full liquid diet this evening. Will monitor. Information Obtained From:   [x] Chart Review  [x] Patient  [] Family/Caregiver  [] Nurse/Physician   [] Patient Rounds/Interdisciplinary Meeting    Diet: Clear liquids  Patient Vitals for the past 100 hrs:   % Diet Eaten   07/20/17 1742 90 %     Medications: [x] Reviewed   Encounter Diagnoses     ICD-10-CM ICD-9-CM   1. Gastrointestinal hemorrhage, unspecified gastrointestinal hemorrhage type K92.2 578.9   2. Febrile illness, acute R50.9 780.60   3. Acute UTI N39.0 599.0   4.  Low hemoglobin D64.9 285.9     Past Medical History:   Diagnosis Date    [facility]     DM [facility]diabetes mellitus) [facility] [address]     Gout     HTN (hypertension)      Labs:    Lab Results   Component Value Date/Time    Sodium 144 07/21/2017 05:25 AM    Potassium 3.4 07/21/2017 05:25 AM    Chloride 109 07/21/2017 05:25 AM    CO2 24 07/21/2017 05:25 AM    Anion gap 11 07/21/2017 05:25 AM    Glucose 126 07/21/2017 05:25 AM    BUN 57 07/21/2017 05:25 AM    Creatinine 2.16 07/21/2017 05:25 AM    Calcium 8.9 07/21/2017 05:25 AM    Magnesium 2.5 07/16/2017 11:50 PM    Phosphorus 3.4 07/21/2017 05:25 AM    Albumin 1.7 07/21/2017 05:25 AM     Anthropometrics: BMI (calculated): 26.7   Last 3 Recorded Weights in this Encounter    07/20/17 0340 07/20/17 0658 07/21/17 0619   Weight: 90.3 kg (199 lb 1.2 oz) 90.1 kg (198 lb 9.6 oz) 89.2 kg (196 lb 10.4 oz)      Ht Readings from Last 1 Encounters:   07/16/17 6' (1.829 m)     []  Weight Loss  []  Weight Gain  [x]  Weight Stable (+/- 1-2 lb)   []  New wt n/a on record     Estimated Nutrition Needs:   2155 Kcals/day  Protein (g): 87 g    Nutrition Problems Identified:   [x] Suboptimal PO intake vs clear liquids  [] Food Allergies  [] Difficulty chewing/swallowing/poor dentition  [] Constipation/Diarrhea   [] Nausea/Vomiting   [] None  [] Other:     Plan:   [x] Therapeutic Diet  []  Obtained/adjusted food preferences/tolerances and/or snacks options   [x]  Supplements added   [] Occupational therapy following for feeding techniques  []  HS snack added   []  Modify diet texture   []  Modify diet for food allergies   []  Assist with menu selection   [x]  Monitor PO intake on meal rounds   [x]  Continue inpatient monitoring and intervention   []  Participated in discharge planning/Interdisciplinary rounds/Team meetings   []  Other:     Education Needs:   [x] Not appropriate for teaching at this time    [] Identified and addressed    Nutrition Monitoring and Evaluation:   [x] Continue inpatient monitoring and interventions    [] Other:     Nithya Records

## 2017-07-21 NOTE — PROGRESS NOTES
Progress Note      Patient: Maycol Velasquez               Sex: male          DOA: 7/15/2017       YOB: 1945      Age:  67 y.o.        LOS:  LOS: 6 days               Subjective:   Pt has had a 2 d echo and his ef is 15 % he does not seem to be in failure at the present time . As per gi note  The pt's  Ct scan shows that the  colon has undergone significant decompression . The pt's abdomen is presently soft and nontender to palpation and he has bowel sounds . The kub today shows mild colonic dilation . His troponin is 0.13. it was 0.24 in the past couple of days . The creatinine is improving to 3.68.pt pulled out his ng tube     Very hard of hearing, awake, alert    Objective:      Visit Vitals    /67    Pulse 92    Temp 98 °F (36.7 °C)    Resp 20    Ht 6' (1.829 m)    Wt 89.2 kg (196 lb 10.4 oz)    SpO2 97%    BMI 26.67 kg/m2       Physical Exam:  Pt is a deaf mute . reads lips however. Heart reg rate and rhythm   Lungs good breath sounds heard   Abdomen soft bowel sounds heard   Neuro moves all extremities .      Lab/Data Reviewed:  CMP:   Lab Results   Component Value Date/Time     07/21/2017 05:25 AM    K 3.4 (L) 07/21/2017 05:25 AM     (H) 07/21/2017 05:25 AM    CO2 24 07/21/2017 05:25 AM    AGAP 11 07/21/2017 05:25 AM     (H) 07/21/2017 05:25 AM    BUN 57 (H) 07/21/2017 05:25 AM    CREA 2.16 (H) 07/21/2017 05:25 AM    GFRAA 37 (L) 07/21/2017 05:25 AM    GFRNA 30 (L) 07/21/2017 05:25 AM    CA 8.9 07/21/2017 05:25 AM    PHOS 3.4 07/21/2017 05:25 AM    ALB 1.7 (L) 07/21/2017 05:25 AM     CBC:   No results found for: WBC, HGB, HGBEXT, HCT, HCTEXT, PLT, PLTEXT, HGBEXT, HCTEXT, PLTEXT        Assessment/Plan     Principal Problem:    Lower GI bleed (7/15/2017)    Active Problems:    Altered mental status (7/15/2017)      CKD (chronic kidney disease) stage 3, GFR 30-59 ml/min (7/15/2017)      Urinary tract infection without hematuria (4/05/1628)      Systolic CHF, chronic ([facility] [address] (7/18/2017)        Plan:    NEPTALI on chronic CKD-- Improving with time, continue slow IVF, no directics for now  Chronic Systolic HF  Colonic pseudoobstruction- s/p NGT , improving, fulls today  Staph aureus bacteremia-  Unclear source sensitive , cefazolin, Ordered IR US asp of R knee fluid.   Cx , gramstain cell count

## 2017-07-21 NOTE — PROGRESS NOTES
[facility]: This patient meets P & T approved criteria for conversion from IV to oral therapy for the following medication:     Pantoprazole 40 mg IV q24h for GIB was discontinued and Pantoprazole 40 mg tab PO daily was ordered. If the patient no longer meets all criteria for oral therapy, the pharmacist will switch back to IV therapy. Thanks.

## 2017-07-21 NOTE — PROGRESS NOTES
RENAL PROGRESS NOTE        Ahsan Gaitan         Assessment/Plan:   · NEPTALI ( ischemic atn in a setting of uti/sepsis/gi bleeding). Non oliguric, scr is improving. Continue ivf until oral intake is adequate. · CKD 3 due to presumably dm/htn. · Hypokalemia. Replace. Hyperkalemia has resolved. · Hypernatremia. Continue hypotonic ivf, encourage oral intake of free water. · MSSA high grade bacteriemia. Source? Abx per ID.   · E. Coli uti. · HTN. Stable off medications. · Systolic chf. No overt decompensation. · GI bleed/colonic pseudoobstruction. Improving. GI on the case. Subjective:  Patient complaints off: More alert, tolerates liquids. Patient Active Problem List   Diagnosis Code    Acute upper GI bleed K92.2    NEPTALI (acute kidney injury) ([facility] [address] N17.9    Febrile illness, acute R50.9    Lower GI bleed K92.2    Altered mental status R41.82    CKD (chronic kidney disease) stage 3, GFR 30-59 ml/min N18.3    Urinary tract infection without hematuria W19.2    Systolic CHF, chronic (HCC) I50.22       Current Facility-Administered Medications   Medication Dose Route Frequency Provider Last Rate Last Dose    ceFAZolin (ANCEF) 1 g in 0.9% sodium chloride (MBP/ADV) 50 mL MBP  1 g IntraVENous Q12H CLINTON Remy  mL/hr at 07/21/17 0350 1 g at 07/21/17 0350    polyethylene glycol (MIRALAX) packet 17 g  17 g Oral DAILY Akila David MD   17 g at 07/21/17 6818    dextrose 5% infusion  50 mL/hr IntraVENous CONTINUOUS Areli PAZ MD 50 mL/hr at 07/21/17 0619 50 mL/hr at 07/21/17 0619    acetaminophen (TYLENOL) tablet 650 mg  650 mg Oral Q4H PRN Dwain Weir, DO        glucose chewable tablet 16 g  4 Tab Oral PRN Dwain Weir DO        glucagon (GLUCAGEN) injection 1 mg  1 mg IntraMUSCular PRN Dwain Weir, DO        dextrose (D50W) injection syrg 12.5-25 g  25-50 mL IntraVENous PRN Madeline Paz DO        insulin lispro (HUMALOG) injection   SubCUTAneous Q6H Madeline Paz DO   Stopped at 07/19/17 1800    pantoprazole (PROTONIX) 40 mg in sodium chloride 0.9 % 10 mL injection  40 mg IntraVENous DAILY Marylene Blakes, MD   40 mg at 07/21/17 0830    dilTIAZem (CARDIZEM) 100 mg in 0.9% sodium chloride (MBP/ADV) 100 mL infusion  0-15 mg/hr IntraVENous TITRATE Segun Diop MD   Stopped at 07/18/17 2153    sodium chloride (NS) flush 5-10 mL  5-10 mL IntraVENous PRN Connie Martins MD        0.9% sodium chloride infusion 250 mL  250 mL IntraVENous PRN Connie Martins MD        latanoprost (XALATAN) 0.005 % ophthalmic solution 1 Drop  1 Drop Both Eyes QHS Segun Diop MD   1 Drop at 07/20/17 2200    nitroglycerin (NITRODUR) 0.4 mg/hr patch 1 Patch  1 Patch TransDERmal DAILY Segun Diop MD   1 Patch at 07/21/17 0833    brimonidine (ALPHAGAN) 0.2 % ophthalmic solution 1 Drop  1 Drop Both Eyes BID Segun Diop MD   1 Drop at 07/21/17 0828    dorzolamide-timolol (COSOPT) 22.3-6.8 mg/mL ophthalmic solution 1 Drop  1 Drop Both Eyes BID Segun Diop MD   1 Drop at 07/21/17 0836    senna (SENOKOT) tablet 8.6 mg  1 Tab Oral BID Segun Diop MD   8.6 mg at 07/21/17 1277    sodium chloride (NS) flush 5-10 mL  5-10 mL IntraVENous Q8H Segun Diop MD   10 mL at 07/21/17 0519    sodium chloride (NS) flush 5-10 mL  5-10 mL IntraVENous PRN Segun Diop MD        ondansetron [facility]) injection 4 mg  4 mg IntraVENous Q6H PRN Segun Diop MD        0.9% sodium chloride infusion  75 mL/hr IntraVENous PRN Segun Diop MD           Objective  Vitals:    07/20/17 2226 07/21/17 0213 07/21/17 0536 07/21/17 0619   BP: 122/60 118/66 119/65    Pulse: (!) 107 (!) 114 93    Resp: 20 20 20    Temp: 99.1 °F (37.3 °C) 98.6 °F (37 °C) 98.7 °F (37.1 °C)    SpO2: 97% 97% 100%    Weight:    89.2 kg (196 lb 10.4 oz)   Height:             Intake/Output Summary (Last 24 hours) at 07/21/17 0924  Last data filed at 07/21/17 0659   Gross per 24 hour   Intake             2780 ml   Output             1615 ml   Net             1165 ml           Admission weight: Weight: 89.4 kg (197 lb) (07/15/17 1530)  Last Weight Metrics:  Weight Loss Metrics 7/21/2017 2/17/2017 3/15/2016   Today's Wt 196 lb 10.4 oz 205 lb 1.6 oz 223 lb   BMI 26.67 kg/m2 29.43 kg/m2 30.24 kg/m2             Physical Assessment:     General: NAD. Dry oral mucosa. Neck: No jvd. LUNGS: Clear to Auscultation, No rales, rhonchi or wheezes. CVS EXM: S1, S2  RRR, no murmurs/gallops/rubs. Abdomen: soft, non tender, slightly distended, pos bs. Lower Extremities:  trace edema. Lab    CBC w/Diff Recent Labs      07/20/17   0505  07/18/17   1352   WBC  9.7  16.6*   RBC  5.28  5.06   HGB  13.5  12.9*   HCT  43.9  42.0   PLT  251  283   GRANS  83*  87*   LYMPH  7*  8*   EOS  3  2        Chemistry Recent Labs      07/21/17   0525  07/20/17   0505  07/19/17   0405   GLU  126*  116*  150*   NA  144  142  147*   K  3.4*  3.6  4.0   CL  109*  111*  112*   CO2  24  22  22   BUN  57*  73*  87*   CREA  2.16*  2.78*  3.68*   CA  8.9  8.9  9.5   AGAP  11  9  13   BUCR  26*  26*  24*   AP   --   137*   --    TP   --   8.3*   --    ALB  1.7*  1.9*  1.9*   GLOB   --   6.4*   --    AGRAT   --   0.3*   --    PHOS  3.4   --   4.8         Lab Results   Component Value Date/Time    Iron 15 07/17/2017 03:50 AM    TIBC 181 07/17/2017 03:50 AM    Iron % saturation 8 07/17/2017 03:50 AM    Ferritin 12 03/15/2016 09:50 AM      Lab Results   Component Value Date/Time    Calcium 8.9 07/21/2017 05:25 AM    Phosphorus 3.4 07/21/2017 05:25 AM        Tanisha Akhtar M.D.   [facility]  Phone

## 2017-07-22 LAB
ALBUMIN SERPL BCP-MCNC: 1.6 G/DL (ref 3.4–5)
ANION GAP BLD CALC-SCNC: 11 MMOL/L (ref 3–18)
BUN SERPL-MCNC: 46 MG/DL (ref 7–18)
BUN/CREAT SERPL: 25 (ref 12–20)
CALCIUM SERPL-MCNC: 9.5 MG/DL (ref 8.5–10.1)
CHLORIDE SERPL-SCNC: 108 MMOL/L (ref 100–108)
CO2 SERPL-SCNC: 24 MMOL/L (ref 21–32)
CREAT SERPL-MCNC: 1.86 MG/DL (ref 0.6–1.3)
GLUCOSE BLD STRIP.AUTO-MCNC: 114 MG/DL (ref 70–110)
GLUCOSE BLD STRIP.AUTO-MCNC: 130 MG/DL (ref 70–110)
GLUCOSE BLD STRIP.AUTO-MCNC: 148 MG/DL (ref 70–110)
GLUCOSE BLD STRIP.AUTO-MCNC: 175 MG/DL (ref 70–110)
GLUCOSE BLD STRIP.AUTO-MCNC: 200 MG/DL (ref 70–110)
GLUCOSE SERPL-MCNC: 104 MG/DL (ref 74–99)
PHOSPHATE SERPL-MCNC: 3.1 MG/DL (ref 2.5–4.9)
POTASSIUM SERPL-SCNC: 3.4 MMOL/L (ref 3.5–5.5)
SODIUM SERPL-SCNC: 143 MMOL/L (ref 136–145)

## 2017-07-22 PROCEDURE — 74011250637 HC RX REV CODE- 250/637: Performed by: HOSPITALIST

## 2017-07-22 PROCEDURE — 74011250636 HC RX REV CODE- 250/636: Performed by: INTERNAL MEDICINE

## 2017-07-22 PROCEDURE — 36415 COLL VENOUS BLD VENIPUNCTURE: CPT | Performed by: INTERNAL MEDICINE

## 2017-07-22 PROCEDURE — 82962 GLUCOSE BLOOD TEST: CPT

## 2017-07-22 PROCEDURE — 87040 BLOOD CULTURE FOR BACTERIA: CPT | Performed by: HOSPITALIST

## 2017-07-22 PROCEDURE — 74011636637 HC RX REV CODE- 636/637: Performed by: HOSPITALIST

## 2017-07-22 PROCEDURE — 80069 RENAL FUNCTION PANEL: CPT | Performed by: INTERNAL MEDICINE

## 2017-07-22 PROCEDURE — 77010033678 HC OXYGEN DAILY

## 2017-07-22 PROCEDURE — 74011250637 HC RX REV CODE- 250/637: Performed by: INTERNAL MEDICINE

## 2017-07-22 PROCEDURE — 65660000000 HC RM CCU STEPDOWN

## 2017-07-22 RX ADMIN — POLYETHYLENE GLYCOL 3350 17 G: 17 POWDER, FOR SOLUTION ORAL at 10:07

## 2017-07-22 RX ADMIN — CEFAZOLIN SODIUM 2 G: 2 SOLUTION INTRAVENOUS at 12:22

## 2017-07-22 RX ADMIN — DORZOLAMIDE HYDROCHLORIDE AND TIMOLOL MALEATE 1 DROP: 20; 5 SOLUTION/ DROPS OPHTHALMIC at 17:12

## 2017-07-22 RX ADMIN — LATANOPROST 1 DROP: 50 SOLUTION OPHTHALMIC at 22:00

## 2017-07-22 RX ADMIN — Medication 10 ML: at 14:59

## 2017-07-22 RX ADMIN — Medication 10 ML: at 06:17

## 2017-07-22 RX ADMIN — CEFAZOLIN SODIUM 2 G: 2 SOLUTION INTRAVENOUS at 04:44

## 2017-07-22 RX ADMIN — CEFAZOLIN SODIUM 2 G: 2 SOLUTION INTRAVENOUS at 22:34

## 2017-07-22 RX ADMIN — DORZOLAMIDE HYDROCHLORIDE AND TIMOLOL MALEATE 1 DROP: 20; 5 SOLUTION/ DROPS OPHTHALMIC at 10:13

## 2017-07-22 RX ADMIN — INSULIN LISPRO 3 UNITS: 100 INJECTION, SOLUTION INTRAVENOUS; SUBCUTANEOUS at 17:11

## 2017-07-22 RX ADMIN — SODIUM CHLORIDE AND POTASSIUM CHLORIDE: 4.5; 1.49 INJECTION, SOLUTION INTRAVENOUS at 08:13

## 2017-07-22 RX ADMIN — Medication 10 ML: at 22:34

## 2017-07-22 RX ADMIN — BRIMONIDINE TARTRATE 1 DROP: 2 SOLUTION OPHTHALMIC at 10:12

## 2017-07-22 RX ADMIN — SENNOSIDES 8.6 MG: 8.6 TABLET, FILM COATED ORAL at 10:06

## 2017-07-22 RX ADMIN — INSULIN LISPRO 6 UNITS: 100 INJECTION, SOLUTION INTRAVENOUS; SUBCUTANEOUS at 12:22

## 2017-07-22 RX ADMIN — BRIMONIDINE TARTRATE 1 DROP: 2 SOLUTION OPHTHALMIC at 17:12

## 2017-07-22 RX ADMIN — PANTOPRAZOLE SODIUM 40 MG: 40 TABLET, DELAYED RELEASE ORAL at 10:06

## 2017-07-22 RX ADMIN — SENNOSIDES 8.6 MG: 8.6 TABLET, FILM COATED ORAL at 17:12

## 2017-07-22 NOTE — ROUTINE PROCESS
1725 assumed care of pt after bedside verbal report was given by off going nurse, pt asleep in bed quietly, .45 normal saline infusing with 20 meq of kcl infusing at 75 ml/hr, no distress noted, will monitor     0911 pt resting in bed quietly, no changes noted    1556 Bedside and Verbal shift change report given to Rob Kevin (oncoming nurse) by BENJAMIN Polk (offgoing nurse). Report included the following information SBAR, Kardex and MAR.

## 2017-07-22 NOTE — PROGRESS NOTES
Progress Note      Patient: Patricia Cain               Sex: male          DOA: 7/15/2017       YOB: 1945      Age:  67 y.o.        LOS:  LOS: 7 days               Subjective:   Pt has had a 2 d echo and his ef is 15 % he does not seem to be in failure at the present time . As per gi note  The pt's  Ct scan shows that the  colon has undergone significant decompression . The pt's abdomen is presently soft and nontender to palpation and he has bowel sounds . The kub today shows mild colonic dilation . His troponin is 0.13. it was 0.24 in the past couple of days . The creatinine is improving     Very hard of hearing, awake, alert    Objective:      Visit Vitals    /70    Pulse 100    Temp 97.8 °F (36.6 °C)    Resp 24    Ht 6' (1.829 m)    Wt 91.2 kg (201 lb 1.6 oz)    SpO2 94%    BMI 27.27 kg/m2       Physical Exam:  Pt is a deaf mute . reads lips however. Heart reg rate and rhythm   Lungs good breath sounds heard   Abdomen soft bowel sounds heard   Neuro moves all extremities .      Lab/Data Reviewed:  CMP:   Lab Results   Component Value Date/Time     07/22/2017 03:55 AM    K 3.4 (L) 07/22/2017 03:55 AM     07/22/2017 03:55 AM    CO2 24 07/22/2017 03:55 AM    AGAP 11 07/22/2017 03:55 AM     (H) 07/22/2017 03:55 AM    BUN 46 (H) 07/22/2017 03:55 AM    CREA 1.86 (H) 07/22/2017 03:55 AM    GFRAA 44 (L) 07/22/2017 03:55 AM    GFRNA 36 (L) 07/22/2017 03:55 AM    CA 9.5 07/22/2017 03:55 AM    PHOS 3.1 07/22/2017 03:55 AM    ALB 1.6 (L) 07/22/2017 03:55 AM     CBC:   No results found for: WBC, HGB, HGBEXT, HCT, HCTEXT, PLT, PLTEXT, HGBEXT, HCTEXT, PLTEXT        Assessment/Plan     Principal Problem:    Lower GI bleed (7/15/2017)    Active Problems:    Altered mental status (7/15/2017)      CKD (chronic kidney disease) stage 3, GFR 30-59 ml/min (7/15/2017)      Urinary tract infection without hematuria (7/26/4923)      Systolic CHF, chronic (HCC) (7/18/2017)        Plan:    NEPTALI on chronic CKD-- Improving with time, continue slow IVF, no directics for now  Chronic Systolic HF  Colonic pseudoobstruction- s/p NGT , improving, fulls today  Staph aureus bacteremia-  Unclear source sensitive , cefazolin,  IR US asp of R knee fluid. Cx , gramstain cell count, does not appear to be a likely source at this time.   May need TONYA on monday

## 2017-07-22 NOTE — PROGRESS NOTES
Received bedside verbal shift  report. Pt lying in bed resting comfortably. 2.5lnc in place. Piv #  18 to right a/c with 1/2 ns with 20 meq kcl infusing at 75 ml/hr. nsr with 1st avb on telemetry box # 14   . Call bell within reach,  side rails up x 3, bed low and locked. No signs of distress noted, will continue to monitor. 4590  Reassessment completed, no changes noted. 8830  Critical lab result positive blood cultures gram negative cocci in clusters from aerobic bottle called by theresa pérez. 0732  Bedside and Verbal shift change report given to [id number] Marco A Salmon rn (oncoming nurse) by sinai valencia rn  (offgoing nurse). Report included the following information SBAR, Kardex, Intake/Output, MAR, Recent Results and Cardiac Rhythm nsr 1st avb.

## 2017-07-22 NOTE — ROUTINE PROCESS
2125: Assumed care. Awake. Resting. Denies any pain or discomfort at this time. Call light within reach. 2257: Due meds given. 2344: . No coverage given per protocol. Bedside and Verbal shift change report given to [address] [place] [address]. Madi Harrington RN (oncoming nurse) by Jacob Guidry RN (offgoing nurse). Report included the following information SBAR, Kardex, Intake/Output, MAR and Recent Results.

## 2017-07-22 NOTE — PROGRESS NOTES
RENAL PROGRESS NOTE        Meagan Hooker         Assessment/Plan:   · NEPTALI ( ischemic atn in a setting of uti/sepsis/gi bleeding). Non oliguric, scr is improving. DC IVF, oral intake is fair. · CKD 3 due to presumably dm/htn. · Hypokalemia. Replace. Hyperkalemia has resolved. · Hypernatremia. Improved  · MSSA high grade bacteriemia. Source? Abx per ID. May Need TONYA  · E. Coli uti. · HTN. Stable off medications. · Systolic chf. No overt decompensation. Subjective:  Patient complaints off:   Non verbal.   NAD      Patient Active Problem List   Diagnosis Code    Acute upper GI bleed K92.2    NEPTALI (acute kidney injury) ([facility] [address] N17.9    Febrile illness, acute R50.9    Lower GI bleed K92.2    Altered mental status R41.82    CKD (chronic kidney disease) stage 3, GFR 30-59 ml/min N18.3    Urinary tract infection without hematuria P15.8    Systolic CHF, chronic (HCC) I50.22       Current Facility-Administered Medications   Medication Dose Route Frequency Provider Last Rate Last Dose    0.45% sodium chloride with KCl 20 mEq/L infusion   IntraVENous CONTINUOUS Ladonna PAZ MD 75 mL/hr at 07/22/17 0813      ceFAZolin (ANCEF) 2g IVPB in 50 mL D5W  2 g IntraVENous Q8H CLINTON Oconnell  mL/hr at 07/22/17 0444 2 g at 07/22/17 0444    pantoprazole (PROTONIX) tablet 40 mg  40 mg Oral DAILY Jimmie Martinez MD   40 mg at 07/22/17 1006    polyethylene glycol (MIRALAX) packet 17 g  17 g Oral DAILY Daryn Leiva MD   17 g at 07/22/17 1007    acetaminophen (TYLENOL) tablet 650 mg  650 mg Oral Q4H PRN Youlanda Chambers, DO        glucose chewable tablet 16 g  4 Tab Oral PRN Youlanda Chambers, DO        glucagon (GLUCAGEN) injection 1 mg  1 mg IntraMUSCular PRN Youlanda Chambers, DO        dextrose (D50W) injection syrg 12.5-25 g  25-50 mL IntraVENous PRN Youlanda Chambers, DO        insulin lispro (HUMALOG) injection   SubCUTAneous Q6H Youlanda Chambers, DO Stopped at 07/21/17 2344    sodium chloride (NS) flush 5-10 mL  5-10 mL IntraVENous PRN Raquel Dick MD        0.9% sodium chloride infusion 250 mL  250 mL IntraVENous PRN Raquel Dick MD        latanoprost (XALATAN) 0.005 % ophthalmic solution 1 Drop  1 Drop Both Eyes QHS Alcira Manriquez MD   1 Drop at 07/21/17 2304    nitroglycerin (NITRODUR) 0.4 mg/hr patch 1 Patch  1 Patch TransDERmal DAILY Alcira Manriquez MD   1 Patch at 07/22/17 1009    brimonidine (ALPHAGAN) 0.2 % ophthalmic solution 1 Drop  1 Drop Both Eyes BID Alcira Manriquez MD   1 Drop at 07/22/17 1012    dorzolamide-timolol (COSOPT) 22.3-6.8 mg/mL ophthalmic solution 1 Drop  1 Drop Both Eyes BID Alcira Manriquez MD   1 Drop at 07/22/17 1013    senna (SENOKOT) tablet 8.6 mg  1 Tab Oral BID Alcira Manriquez MD   8.6 mg at 07/22/17 1006    sodium chloride (NS) flush 5-10 mL  5-10 mL IntraVENous Q8H Alcira Manriquez MD   10 mL at 07/22/17 0617    sodium chloride (NS) flush 5-10 mL  5-10 mL IntraVENous PRN Alcira Manriquez MD        ondansetron [facility]) injection 4 mg  4 mg IntraVENous Q6H PRN Alcira Manriquez MD        0.9% sodium chloride infusion  75 mL/hr IntraVENous PRN Alcira Manriquez MD           Objective  Vitals:    07/21/17 2203 07/22/17 0208 07/22/17 0656 07/22/17 0955   BP: 131/68 135/66 124/60 141/70   Pulse: 95 97 97 100   Resp: 18 18 22 24   Temp: 98.7 °F (37.1 °C) 98.9 °F (37.2 °C) 98.8 °F (37.1 °C) 97.8 °F (36.6 °C)   SpO2: 94% 95% 98% 94%   Weight:   91.2 kg (201 lb 1.6 oz)    Height:             Intake/Output Summary (Last 24 hours) at 07/22/17 1039  Last data filed at 07/22/17 0641   Gross per 24 hour   Intake           1487.5 ml   Output             1200 ml   Net            287.5 ml           Admission weight: Weight: 89.4 kg (197 lb) (07/15/17 1530)  Last Weight Metrics:  Weight Loss Metrics 7/22/2017 2/17/2017 3/15/2016   Today's Wt 201 lb 1.6 oz 205 lb 1.6 oz 223 lb   BMI 27.27 kg/m2 29.43 kg/m2 30.24 kg/m2             Physical Assessment:     General: awake  Neck: No jvd. LUNGS: Clear to Auscultation, No rales, rhonchi or wheezes. CVS EXM: S1, S2  RRR, no murmurs/gallops/rubs. Abdomen: soft, non tender. Lower Extremities:  no edema. Lab    CBC w/Diff Recent Labs      07/20/17   0505   WBC  9.7   RBC  5.28   HGB  13.5   HCT  43.9   PLT  251   GRANS  83*   LYMPH  7*   EOS  3        Chemistry Recent Labs      07/22/17   0355  07/21/17   0525   07/20/17   0505   GLU  104*  126*   --   116*   NA  143  144   --   142   K  3.4*  3.4*   --   3.6   CL  108  109*   --   111*   CO2  24  24   --   22   BUN  46*  57*   --   73*   CREA  1.86*  2.16*   --   2.78*   CA  9.5  8.9   --   8.9   AGAP  11  11   --   9   BUCR  25*  26*   --   26*   AP   --    --    --   137*   TP   --    --    --   8.3*   ALB  1.6*  1.7*   --   1.9*   GLOB   --    --    --   6.4*   AGRAT   --    --    --   0.3*   PHOS  3.1  3.4   < >   --     < > = values in this interval not displayed. Lab Results   Component Value Date/Time    Iron 15 07/17/2017 03:50 AM    TIBC 181 07/17/2017 03:50 AM    Iron % saturation 8 07/17/2017 03:50 AM    Ferritin 12 03/15/2016 09:50 AM    Lab Results   Component Value Date/Time    Calcium 9.5 07/22/2017 03:55 AM    Phosphorus 3.1 07/22/2017 03:55 AM        Nilam Bronson M.D.   [facility]  Phone

## 2017-07-23 LAB
ALBUMIN SERPL BCP-MCNC: 1.7 G/DL (ref 3.4–5)
ANION GAP BLD CALC-SCNC: 11 MMOL/L (ref 3–18)
BACTERIA SPEC CULT: ABNORMAL
BACTERIA SPEC CULT: ABNORMAL
BUN SERPL-MCNC: 43 MG/DL (ref 7–18)
BUN/CREAT SERPL: 26 (ref 12–20)
CALCIUM SERPL-MCNC: 9.6 MG/DL (ref 8.5–10.1)
CHLORIDE SERPL-SCNC: 108 MMOL/L (ref 100–108)
CO2 SERPL-SCNC: 24 MMOL/L (ref 21–32)
CREAT SERPL-MCNC: 1.68 MG/DL (ref 0.6–1.3)
GLUCOSE BLD STRIP.AUTO-MCNC: 133 MG/DL (ref 70–110)
GLUCOSE BLD STRIP.AUTO-MCNC: 134 MG/DL (ref 70–110)
GLUCOSE BLD STRIP.AUTO-MCNC: 145 MG/DL (ref 70–110)
GLUCOSE BLD STRIP.AUTO-MCNC: 181 MG/DL (ref 70–110)
GLUCOSE SERPL-MCNC: 127 MG/DL (ref 74–99)
GRAM STN SPEC: ABNORMAL
PHOSPHATE SERPL-MCNC: 2.9 MG/DL (ref 2.5–4.9)
POTASSIUM SERPL-SCNC: 3.3 MMOL/L (ref 3.5–5.5)
SERVICE CMNT-IMP: ABNORMAL
SERVICE CMNT-IMP: ABNORMAL
SODIUM SERPL-SCNC: 143 MMOL/L (ref 136–145)

## 2017-07-23 PROCEDURE — 74011250637 HC RX REV CODE- 250/637: Performed by: HOSPITALIST

## 2017-07-23 PROCEDURE — 77030020263 HC SOL INJ SOD CL0.9% LFCR 1000ML

## 2017-07-23 PROCEDURE — 80069 RENAL FUNCTION PANEL: CPT | Performed by: INTERNAL MEDICINE

## 2017-07-23 PROCEDURE — 65660000000 HC RM CCU STEPDOWN

## 2017-07-23 PROCEDURE — 74011250636 HC RX REV CODE- 250/636: Performed by: INTERNAL MEDICINE

## 2017-07-23 PROCEDURE — 36415 COLL VENOUS BLD VENIPUNCTURE: CPT | Performed by: INTERNAL MEDICINE

## 2017-07-23 PROCEDURE — 77010033678 HC OXYGEN DAILY

## 2017-07-23 PROCEDURE — 74011636637 HC RX REV CODE- 636/637: Performed by: HOSPITALIST

## 2017-07-23 PROCEDURE — 74011250637 HC RX REV CODE- 250/637: Performed by: INTERNAL MEDICINE

## 2017-07-23 PROCEDURE — 82962 GLUCOSE BLOOD TEST: CPT

## 2017-07-23 RX ADMIN — PANTOPRAZOLE SODIUM 40 MG: 40 TABLET, DELAYED RELEASE ORAL at 10:28

## 2017-07-23 RX ADMIN — BRIMONIDINE TARTRATE 1 DROP: 2 SOLUTION OPHTHALMIC at 10:41

## 2017-07-23 RX ADMIN — CEFAZOLIN SODIUM 2 G: 2 SOLUTION INTRAVENOUS at 13:55

## 2017-07-23 RX ADMIN — SENNOSIDES 8.6 MG: 8.6 TABLET, FILM COATED ORAL at 17:31

## 2017-07-23 RX ADMIN — DORZOLAMIDE HYDROCHLORIDE AND TIMOLOL MALEATE 1 DROP: 20; 5 SOLUTION/ DROPS OPHTHALMIC at 17:33

## 2017-07-23 RX ADMIN — Medication 10 ML: at 13:56

## 2017-07-23 RX ADMIN — DORZOLAMIDE HYDROCHLORIDE AND TIMOLOL MALEATE 1 DROP: 20; 5 SOLUTION/ DROPS OPHTHALMIC at 10:41

## 2017-07-23 RX ADMIN — LATANOPROST 1 DROP: 50 SOLUTION OPHTHALMIC at 23:14

## 2017-07-23 RX ADMIN — INSULIN LISPRO 3 UNITS: 100 INJECTION, SOLUTION INTRAVENOUS; SUBCUTANEOUS at 13:55

## 2017-07-23 RX ADMIN — BRIMONIDINE TARTRATE 1 DROP: 2 SOLUTION OPHTHALMIC at 17:32

## 2017-07-23 RX ADMIN — Medication 10 ML: at 06:44

## 2017-07-23 RX ADMIN — SENNOSIDES 8.6 MG: 8.6 TABLET, FILM COATED ORAL at 10:28

## 2017-07-23 RX ADMIN — Medication 10 ML: at 22:44

## 2017-07-23 RX ADMIN — SODIUM CHLORIDE AND POTASSIUM CHLORIDE: 4.5; 1.49 INJECTION, SOLUTION INTRAVENOUS at 06:44

## 2017-07-23 RX ADMIN — CEFAZOLIN SODIUM 2 G: 2 SOLUTION INTRAVENOUS at 22:44

## 2017-07-23 RX ADMIN — POLYETHYLENE GLYCOL 3350 17 G: 17 POWDER, FOR SOLUTION ORAL at 10:28

## 2017-07-23 RX ADMIN — CEFAZOLIN SODIUM 2 G: 2 SOLUTION INTRAVENOUS at 06:44

## 2017-07-23 RX ADMIN — SODIUM CHLORIDE AND POTASSIUM CHLORIDE: 4.5; 1.49 INJECTION, SOLUTION INTRAVENOUS at 17:24

## 2017-07-23 NOTE — ROUTINE PROCESS
1545 - Bedside, Verbal and Written shift change report given to Ryan Ricardo RN (oncoming nurse) by Yudy Patel RN (offgoing nurse). Report included the following information SBAR, Kardex, Intake/Output, MAR, Recent Results, Med Rec Status, Procedure Summary and Cardiac Rhythm NSR w/BBB, 1st Degree AVB.     1545 - Shift assessment completed. Pt alert and non-verbal, unable to assess orientation. 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 No respiratory distress noted. No c/o pain reported. Call bell within reach, bed in low position. Will continue to monitor.       Bedside, Verbal and Written shift change report given to Minerva Kenny RN (oncoming nurse) by Ryan Ricardo RN (offgoing nurse). Report included the following information SBAR, Kardex, Intake/Output, MAR, Recent Results, Med Rec Status, Procedure Summary and Cardiac Rhythm NSR w/BBB, 1st Degree AVB.

## 2017-07-23 NOTE — PROGRESS NOTES
1945: Bedside shift change report given to Ziggy Royal RN (oncoming nurse) by William Talbot RN (offgoing nurse). Report included the following information SBAR, Kardex, Procedure Summary, Intake/Output and MAR.     0030: Pt. Sleeping soundly, no complaints or concerns at this time. 0730: Bedside shift change report given to Santana Yao RN (oncoming nurse) by Ziggy Royal RN (offgoing nurse). Report included the following information SBAR, Kardex, Procedure Summary, Intake/Output and MAR.

## 2017-07-23 NOTE — PROGRESS NOTES
Progress Note      Patient: Mary Delgadillo               Sex: male          DOA: 7/15/2017       YOB: 1945      Age:  67 y.o.        LOS:  LOS: 8 days               Subjective:   Pt has had a 2 d echo and his ef is 15 % he does not seem to be in failure at the present time . As per gi note  The pt's  Ct scan shows that the  colon has undergone significant decompression . The pt's abdomen is presently soft and nontender to palpation and he has bowel sounds . The kub today shows mild colonic dilation . His troponin is 0.13. it was 0.24 in the past couple of days . The creatinine is improving . Patient with MSSA bacteremia unknown source. Negative TTE, negative knee tap, TONYA likely monday    Sleeping    Objective:      Visit Vitals    /72    Pulse 99    Temp 98.2 °F (36.8 °C)    Resp 20    Ht 6' (1.829 m)    Wt 91.2 kg (201 lb)    SpO2 99%    BMI 27.26 kg/m2       Physical Exam:  Pt is a deaf mute . reads lips however. Heart reg rate and rhythm   Lungs good breath sounds heard   Abdomen soft bowel sounds heard   Neuro moves all extremities .      Lab/Data Reviewed:  CMP:   Lab Results   Component Value Date/Time     07/23/2017 05:37 AM    K 3.3 (L) 07/23/2017 05:37 AM     07/23/2017 05:37 AM    CO2 24 07/23/2017 05:37 AM    AGAP 11 07/23/2017 05:37 AM     (H) 07/23/2017 05:37 AM    BUN 43 (H) 07/23/2017 05:37 AM    CREA 1.68 (H) 07/23/2017 05:37 AM    GFRAA 49 (L) 07/23/2017 05:37 AM    GFRNA 40 (L) 07/23/2017 05:37 AM    CA 9.6 07/23/2017 05:37 AM    PHOS 2.9 07/23/2017 05:37 AM    ALB 1.7 (L) 07/23/2017 05:37 AM     CBC:   No results found for: WBC, HGB, HGBEXT, HCT, HCTEXT, PLT, PLTEXT, HGBEXT, HCTEXT, PLTEXT        Assessment/Plan     Principal Problem:    Lower GI bleed (7/15/2017)    Active Problems:    Altered mental status (7/15/2017)      CKD (chronic kidney disease) stage 3, GFR 30-59 ml/min (7/15/2017)      Urinary tract infection without hematuria (8/15/6288)      Systolic CHF, chronic ([facility] [address] (7/18/2017)        Plan:    NEPTALI on chronic CKD-- Improving with time, continue slow IVF, no directics for now  Chronic Systolic HF  Colonic pseudoobstruction- s/p NGT , improving, fulls today  Staph aureus bacteremia-  Unclear source sensitive , cefazolin,  IR US asp of R knee fluid. Cx , gramstain cell count, does not appear to be a likely source at this time.    TONYA on monday

## 2017-07-24 LAB
ALBUMIN SERPL BCP-MCNC: 1.5 G/DL (ref 3.4–5)
ANION GAP BLD CALC-SCNC: 10 MMOL/L (ref 3–18)
BUN SERPL-MCNC: 35 MG/DL (ref 7–18)
BUN/CREAT SERPL: 23 (ref 12–20)
CALCIUM SERPL-MCNC: 9.1 MG/DL (ref 8.5–10.1)
CHLORIDE SERPL-SCNC: 107 MMOL/L (ref 100–108)
CO2 SERPL-SCNC: 25 MMOL/L (ref 21–32)
CREAT SERPL-MCNC: 1.55 MG/DL (ref 0.6–1.3)
GLUCOSE BLD STRIP.AUTO-MCNC: 103 MG/DL (ref 70–110)
GLUCOSE BLD STRIP.AUTO-MCNC: 143 MG/DL (ref 70–110)
GLUCOSE BLD STRIP.AUTO-MCNC: 171 MG/DL (ref 70–110)
GLUCOSE BLD STRIP.AUTO-MCNC: 172 MG/DL (ref 70–110)
GLUCOSE SERPL-MCNC: 109 MG/DL (ref 74–99)
PHOSPHATE SERPL-MCNC: 2.5 MG/DL (ref 2.5–4.9)
POTASSIUM SERPL-SCNC: 3 MMOL/L (ref 3.5–5.5)
SODIUM SERPL-SCNC: 142 MMOL/L (ref 136–145)

## 2017-07-24 PROCEDURE — 36415 COLL VENOUS BLD VENIPUNCTURE: CPT | Performed by: INTERNAL MEDICINE

## 2017-07-24 PROCEDURE — 74011250637 HC RX REV CODE- 250/637: Performed by: HOSPITALIST

## 2017-07-24 PROCEDURE — 74011250636 HC RX REV CODE- 250/636: Performed by: INTERNAL MEDICINE

## 2017-07-24 PROCEDURE — 82962 GLUCOSE BLOOD TEST: CPT

## 2017-07-24 PROCEDURE — 65660000000 HC RM CCU STEPDOWN

## 2017-07-24 PROCEDURE — 74011250637 HC RX REV CODE- 250/637: Performed by: INTERNAL MEDICINE

## 2017-07-24 PROCEDURE — 74011636637 HC RX REV CODE- 636/637: Performed by: HOSPITALIST

## 2017-07-24 PROCEDURE — 80069 RENAL FUNCTION PANEL: CPT | Performed by: INTERNAL MEDICINE

## 2017-07-24 PROCEDURE — 77010033678 HC OXYGEN DAILY

## 2017-07-24 RX ORDER — POTASSIUM CHLORIDE 1.5 G/1.77G
40 POWDER, FOR SOLUTION ORAL 2 TIMES DAILY WITH MEALS
Status: COMPLETED | OUTPATIENT
Start: 2017-07-24 | End: 2017-07-25

## 2017-07-24 RX ADMIN — SODIUM CHLORIDE AND POTASSIUM CHLORIDE: 4.5; 1.49 INJECTION, SOLUTION INTRAVENOUS at 09:47

## 2017-07-24 RX ADMIN — POTASSIUM CHLORIDE 40 MEQ: 1.5 POWDER, FOR SOLUTION ORAL at 10:03

## 2017-07-24 RX ADMIN — LATANOPROST 1 DROP: 50 SOLUTION OPHTHALMIC at 22:17

## 2017-07-24 RX ADMIN — DORZOLAMIDE HYDROCHLORIDE AND TIMOLOL MALEATE 1 DROP: 20; 5 SOLUTION/ DROPS OPHTHALMIC at 18:00

## 2017-07-24 RX ADMIN — BRIMONIDINE TARTRATE 1 DROP: 2 SOLUTION OPHTHALMIC at 09:59

## 2017-07-24 RX ADMIN — CEFAZOLIN SODIUM 2 G: 2 SOLUTION INTRAVENOUS at 16:04

## 2017-07-24 RX ADMIN — Medication 10 ML: at 05:51

## 2017-07-24 RX ADMIN — POTASSIUM CHLORIDE 40 MEQ: 1.5 POWDER, FOR SOLUTION ORAL at 18:44

## 2017-07-24 RX ADMIN — SENNOSIDES 8.6 MG: 8.6 TABLET, FILM COATED ORAL at 18:45

## 2017-07-24 RX ADMIN — CEFAZOLIN SODIUM 2 G: 2 SOLUTION INTRAVENOUS at 22:10

## 2017-07-24 RX ADMIN — BRIMONIDINE TARTRATE 1 DROP: 2 SOLUTION OPHTHALMIC at 18:00

## 2017-07-24 RX ADMIN — Medication 10 ML: at 16:04

## 2017-07-24 RX ADMIN — SENNOSIDES 8.6 MG: 8.6 TABLET, FILM COATED ORAL at 09:49

## 2017-07-24 RX ADMIN — DORZOLAMIDE HYDROCHLORIDE AND TIMOLOL MALEATE 1 DROP: 20; 5 SOLUTION/ DROPS OPHTHALMIC at 09:59

## 2017-07-24 RX ADMIN — CEFAZOLIN SODIUM 2 G: 2 SOLUTION INTRAVENOUS at 05:00

## 2017-07-24 RX ADMIN — Medication 10 ML: at 22:17

## 2017-07-24 RX ADMIN — INSULIN LISPRO 3 UNITS: 100 INJECTION, SOLUTION INTRAVENOUS; SUBCUTANEOUS at 16:03

## 2017-07-24 RX ADMIN — POLYETHYLENE GLYCOL 3350 17 G: 17 POWDER, FOR SOLUTION ORAL at 09:48

## 2017-07-24 RX ADMIN — PANTOPRAZOLE SODIUM 40 MG: 40 TABLET, DELAYED RELEASE ORAL at 09:49

## 2017-07-24 NOTE — PROGRESS NOTES
NUTRITION follow-up/Plan of care    RECOMMENDATIONS:     1. Dental Soft diet  2. Ensure BID  3. Monitor weight, labs and PO intake  4. RD to follow    GOALS:     1. Ongoing: PO intake meets >75% of protein/calorie needs by 7/29  2. Met/Ongoing: Maintain weight (+/- 1-2 lb by 7/29)    ASSESSMENT:      Weight status is classified as overweight per BMI of 27. However, weight appropriate for age. Tolerating dental soft diet. Labs noted. BG range from 103-181 over past 24 hours. Patient with hypoalbuminemia. Nutrition recommendations listed. Will monitor diet advancement. 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 Per GI, patient with colonic dilation, most suggestive of colonic pseudo-obstruction but improved. NG tube pulled by patient and not to be reinserted per MD. Patient with stage 2 pressure ulcer to sacrum per nursing documentation. Tolerating dental soft diet. Less than 50% intake of lunch meal at time of visit, However, 50-90% intake of meals per vitals. Will add Ensure supplements to help meet nutrition needs. Will monitor. Information Obtained From:   [x] Chart Review  [x] Patient  [] Family/Caregiver  [x] Nurse/Physician   [] Patient Rounds/Interdisciplinary Meeting    Diet: Dental Soft diet  Patient Vitals for the past 100 hrs:   % Diet Eaten   07/23/17 1200 50 %   07/23/17 0830 50 %   07/22/17 1748 75 %   07/20/17 1742 90 %     Medications: [x] Reviewed   Encounter Diagnoses     ICD-10-CM ICD-9-CM   1. Gastrointestinal hemorrhage, unspecified gastrointestinal hemorrhage type K92.2 578.9   2. Febrile illness, acute R50.9 780.60   3. Acute UTI N39.0 599.0   4.  Low hemoglobin D64.9 285.9     Past Medical History:   Diagnosis Date    CHF (congestive heart failure) (HCC)     [facility]     Gout     HTN (hypertension)      Labs:    Lab Results   Component Value Date/Time    Sodium 142 07/24/2017 05:25 AM Potassium 3.0 07/24/2017 05:25 AM    Chloride 107 07/24/2017 05:25 AM    CO2 25 07/24/2017 05:25 AM    Anion gap 10 07/24/2017 05:25 AM    Glucose 109 07/24/2017 05:25 AM    BUN 35 07/24/2017 05:25 AM    Creatinine 1.55 07/24/2017 05:25 AM    Calcium 9.1 07/24/2017 05:25 AM    Magnesium 2.5 07/16/2017 11:50 PM    Phosphorus 2.5 07/24/2017 05:25 AM    Albumin 1.5 07/24/2017 05:25 AM     Anthropometrics: BMI (calculated): 27   Last 3 Recorded Weights in this Encounter    07/22/17 0656 07/23/17 0623 07/24/17 0553   Weight: 91.2 kg (201 lb 1.6 oz) 91.2 kg (201 lb) 90.3 kg (199 lb 1.6 oz)      Ht Readings from Last 1 Encounters:   07/16/17 6' (1.829 m)     []  Weight Loss  []  Weight Gain  [x]  Weight Stable (+/- 1-2 lb)   []  New wt n/a on record     Estimated Nutrition Needs:   2155 Kcals/day  Protein (g): 87 g    Nutrition Problems Identified:   [] Suboptimal PO intake   [] Food Allergies  [x] Difficulty chewing/swallowing/poor dentition  [] Constipation/Diarrhea   [] Nausea/Vomiting   [] None  [] Other:     Plan:   [x] Therapeutic Diet  []  Obtained/adjusted food preferences/tolerances and/or snacks options   [x]  Supplements added   [] Occupational therapy following for feeding techniques  []  HS snack added   [x]  Modify diet texture   []  Modify diet for food allergies   []  Assist with menu selection   [x]  Monitor PO intake on meal rounds   [x]  Continue inpatient monitoring and intervention   []  Participated in discharge planning/Interdisciplinary rounds/Team meetings   []  Other:     Education Needs:   [x] Not appropriate for teaching at this time    [] Identified and addressed    Nutrition Monitoring and Evaluation:   [x] Continue inpatient monitoring and interventions    [] Other:     Ladarius Sosa

## 2017-07-24 NOTE — PROGRESS NOTES
Progress Note      Patient: Rober Meza               Sex: male          DOA: 7/15/2017       YOB: 1945      Age:  67 y.o.        LOS:  LOS: 9 days               Subjective:   Pt has had a 2 d echo and his ef is 15 % he does not seem to be in failure at the present time . As per gi note  The pt's  Ct scan shows that the  colon has undergone significant decompression . The pt's abdomen is presently soft and nontender to palpation and he has bowel sounds . The kub today shows mild colonic dilation . He had NGT and this is now resolved. His troponin is 0.13. it was 0.24 in the past couple of days they are flat and likely from NEPTALI. The creatinine is improving . Patient with MSSA bacteremia unknown source. Negative TTE, negative knee tap. Last set of bc negative. If ID still believes we need a TONYA please reorder tomorrow and contact cardiology    Awake, wants to lay down    Objective:      Visit Vitals    /64    Pulse 98    Temp 98.1 °F (36.7 °C)    Resp 18    Ht 6' (1.829 m)    Wt 90.3 kg (199 lb 1.6 oz)    SpO2 99%    BMI 27 kg/m2       Physical Exam:  Pt is a deaf mute . reads lips however. Heart reg rate and rhythm   Lungs good breath sounds heard   Abdomen soft bowel sounds heard   Neuro moves all extremities .      Lab/Data Reviewed:  CMP:   Lab Results   Component Value Date/Time     07/24/2017 05:25 AM    K 3.0 (L) 07/24/2017 05:25 AM     07/24/2017 05:25 AM    CO2 25 07/24/2017 05:25 AM    AGAP 10 07/24/2017 05:25 AM     (H) 07/24/2017 05:25 AM    BUN 35 (H) 07/24/2017 05:25 AM    CREA 1.55 (H) 07/24/2017 05:25 AM    GFRAA 54 (L) 07/24/2017 05:25 AM    GFRNA 44 (L) 07/24/2017 05:25 AM    CA 9.1 07/24/2017 05:25 AM    PHOS 2.5 07/24/2017 05:25 AM    ALB 1.5 (L) 07/24/2017 05:25 AM     CBC:   No results found for: WBC, HGB, HGBEXT, HCT, HCTEXT, PLT, PLTEXT, HGBEXT, HCTEXT, PLTEXT        Assessment/Plan     Principal Problem:    Lower GI bleed (7/15/2017)    Active Problems:    Altered mental status (7/15/2017)      CKD (chronic kidney disease) stage 3, GFR 30-59 ml/min (7/15/2017)      Urinary tract infection without hematuria (4/72/6425)      Systolic CHF, chronic ([facility] [address] (7/18/2017)        Plan:    NEPTALI on chronic CKD-- Improving with time, continue slow IVF, no directics for now  Chronic Systolic HF  Colonic pseudoobstruction- s/p NGT , improving, dental soft diet  Staph aureus bacteremia-  Unclear source sensitive , cefazolin,  IR US asp of R knee fluid. Cx , gramstain cell count, does not appear to be a likely source at this time. TONYA  If needed tomorrow- ID to f/u today if still deemed needed with now negative cx will need to reconsult cards tomorrow with new TONYA order.

## 2017-07-24 NOTE — PROGRESS NOTES
RENAL PROGRESS NOTE        Ginny Byrne         Assessment/Plan:   · NEPTALI ( ischemic atn in a setting of uti/sepsis/gi bleeding). Non oliguric, scr is improving. oral intake is fair. · CKD 3 due to presumably dm/htn. · Hypokalemia. Replace. Hyperkalemia has resolved. · Hypernatremia. Improved  · MSSA high grade bacteriemia. Source? Abx per ID. May Need TONYA  · E. Coli uti. · HTN. Stable off medications. · Systolic chf. No overt decompensation. Subjective:  Patient complaints off:   Non verbal.   NAD      Patient Active Problem List   Diagnosis Code    Acute upper GI bleed K92.2    NEPTALI (acute kidney injury) ([facility] [address] N17.9    Febrile illness, acute R50.9    Lower GI bleed K92.2    Altered mental status R41.82    CKD (chronic kidney disease) stage 3, GFR 30-59 ml/min N18.3    Urinary tract infection without hematuria L88.9    Systolic CHF, chronic (HCC) I50.22       Current Facility-Administered Medications   Medication Dose Route Frequency Provider Last Rate Last Dose    potassium chloride (KLOR-CON) packet 40 mEq  40 mEq Oral BID WITH MEALS Ziggy Villasenor MD        0.45% sodium chloride with KCl 20 mEq/L infusion   IntraVENous CONTINUOUS Inderjit PAZ MD 75 mL/hr at 07/23/17 1724      ceFAZolin (ANCEF) 2g IVPB in 50 mL D5W  2 g IntraVENous Q8H CLINTON Salgado  mL/hr at 07/24/17 0500 2 g at 07/24/17 0500    pantoprazole (PROTONIX) tablet 40 mg  40 mg Oral DAILY Ziggy Villasenor MD   40 mg at 07/23/17 1028    polyethylene glycol (MIRALAX) packet 17 g  17 g Oral DAILY Derek Lopez MD   17 g at 07/23/17 1028    acetaminophen (TYLENOL) tablet 650 mg  650 mg Oral Q4H PRN Phyllis Clay, DO        glucose chewable tablet 16 g  4 Tab Oral PRN Phyllis Clay, DO        glucagon (GLUCAGEN) injection 1 mg  1 mg IntraMUSCular PRN Phyllis Clay, DO        dextrose (D50W) injection syrg 12.5-25 g  25-50 mL IntraVENous PRN Phyllis Clay, DO  insulin lispro (HUMALOG) injection   SubCUTAneous Q6H Sandy Dorsey DO   Stopped at 07/23/17 1800    sodium chloride (NS) flush 5-10 mL  5-10 mL IntraVENous PRN Reina Pitts MD        0.9% sodium chloride infusion 250 mL  250 mL IntraVENous PRN Reina Pitts MD        latanoprost (XALATAN) 0.005 % ophthalmic solution 1 Drop  1 Drop Both Eyes QHS Leana Garner MD   1 Drop at 07/23/17 2314    nitroglycerin (NITRODUR) 0.4 mg/hr patch 1 Patch  1 Patch TransDERmal DAILY Leaan Garner MD   1 Patch at 07/23/17 1028    brimonidine (ALPHAGAN) 0.2 % ophthalmic solution 1 Drop  1 Drop Both Eyes BID Leaan Garner MD   1 Drop at 07/23/17 1732    dorzolamide-timolol (COSOPT) 22.3-6.8 mg/mL ophthalmic solution 1 Drop  1 Drop Both Eyes BID Leana Garner MD   1 Drop at 07/23/17 1733    senna (SENOKOT) tablet 8.6 mg  1 Tab Oral BID Leana Garner MD   8.6 mg at 07/23/17 1731    sodium chloride (NS) flush 5-10 mL  5-10 mL IntraVENous Q8H Leana Garner MD   10 mL at 07/24/17 0551    sodium chloride (NS) flush 5-10 mL  5-10 mL IntraVENous PRN Leana Garner MD        ondansetron [facility]) injection 4 mg  4 mg IntraVENous Q6H PRN Leana Garner MD        0.9% sodium chloride infusion  75 mL/hr IntraVENous PRN Leana Garner MD           Objective  Vitals:    07/23/17 2207 07/24/17 0243 07/24/17 0553 07/24/17 0620   BP: 121/74 125/75  136/56   Pulse: (!) 102 (!) 102  99   Resp: 20 20  20   Temp: 98.8 °F (37.1 °C) 98.8 °F (37.1 °C)  98.8 °F (37.1 °C)   SpO2: 93% 97%  95%   Weight:   90.3 kg (199 lb 1.6 oz)    Height:             Intake/Output Summary (Last 24 hours) at 07/24/17 0944  Last data filed at 07/24/17 0243   Gross per 24 hour   Intake              150 ml   Output             1430 ml   Net            -1280 ml           Admission weight: Weight: 89.4 kg (197 lb) (07/15/17 1530)  Last Weight Metrics:  Weight Loss Metrics 7/24/2017 2/17/2017 3/15/2016   Today's Wt 199 lb 1.6 oz 205 lb 1.6 oz 223 lb   BMI 27 kg/m2 29.43 kg/m2 30.24 kg/m2             Physical Assessment:     General: awake  Neck: No jvd. LUNGS: Clear to Auscultation, No rales, rhonchi or wheezes. CVS EXM: S1, S2  RRR, no murmurs/gallops/rubs. Abdomen: soft, non tender. Lower Extremities:  no edema. Lab    CBC w/Diff No results for input(s): WBC, RBC, HGB, HCT, PLT, GRANS, LYMPH, EOS, HGBEXT, HCTEXT, PLTEXT, HGBEXT, HCTEXT, PLTEXT in the last 72 hours.      Chemistry Recent Labs      07/24/17   0525  07/23/17   0537  07/22/17   0355   GLU  109*  127*  104*   NA  142  143  143   K  3.0*  3.3*  3.4*   CL  107  108  108   CO2  25  24  24   BUN  35*  43*  46*   CREA  1.55*  1.68*  1.86*   CA  9.1  9.6  9.5   AGAP  10  11  11   BUCR  23*  26*  25*   ALB  1.5*  1.7*  1.6*   PHOS  2.5  2.9  3.1         Lab Results   Component Value Date/Time    Iron 15 07/17/2017 03:50 AM    TIBC 181 07/17/2017 03:50 AM    Iron % saturation 8 07/17/2017 03:50 AM    Ferritin 12 03/15/2016 09:50 AM      Lab Results   Component Value Date/Time    Calcium 9.1 07/24/2017 05:25 AM    Phosphorus 2.5 07/24/2017 05:25 AM

## 2017-07-24 NOTE — PROGRESS NOTES
Infectious Disease Follow-up     Admit Date: 7/15/2017    Current abx Prior abx   Cefazolin 7/20-4 levaquin 7/18-2m ceftriaxone 7/15-3 vanco 7/17-2     Assessment ->Rec:     High grade MSSA BSI  - POA 2 of 2 bctx's 7/15, also 7/17 and 7/18 2 of 2 sets;   - 7/20 blcx still + x 2  -echo (TTE) neg x 2 for vegetation but with 5 of 6 positive concern for IE  -c/o R shoulder and R knee pain- may have chronic effusion R knee, no obvious STS R shoulder with post point tenderness, no reported back pain, no reproducible tenderness   -cxr ctap neg, feet ok -> monitor repeat bctx's from 7/22- so far ngtd  -> suggest TONYA though may not alter therapy as will need full 6 week course of treatment   -> doubt good surgical candidate with low EF multiple other comorbidities  ->hold on picc until clear bsi   R knee effusion  - c/o pain though min warmth c/w L  - s/p UR guided arthrocentesis 7/20: 23 cc pink thin nonodorous fluid. gs few wbc, NOS. cx NGTD -> monitor   R shoulder pain- plain film s/o rotator cuff tear ->monitor    E coli UTI -ua adm sm francis est 4-6 wbc grew >100k E coli R amp -- initially treated ceftriaxone x 3 d, on levaquin, repeat uctx ng -isolate S cefazolin but consider treated at this point   Sepsis with T 102.5 7/16, wbc 14.4 7/16- suspect d/t SA BSI ->monitor on abx    chf with  EF 0.15-0.20 this adm    guaic pos stool on admission on coumadin, improved colonic distention  ->per GI   HTN    NEPTALI on CKD3  Cr 2.2 today,  peak 4.7 7/18, was 2.6 2/17, 2.4 on adm -d/w Dr. Aristeo Hunt today   DM2 A1c 6.5    Deaf mute     H/o gout     Increased globulin TP 8.3 alb 1.9 ?  Chronic inflammation vs monoclonal  ->await spep     MICROBIOLOGY:   7/15  bctx x 2 mssa   uctx >100k E coli R amp  7/16 uctx ng  7/17 bctx x 2 mssa  7/18 bctx x 2 mssa  7/20 bctx x 2 MSSA  7/22 blcx NGTD x 2    LINES AND CATHETERS:   piv     Active Hospital Problems    Diagnosis Date Noted    Systolic CHF, chronic ([facility] [address] 07/18/2017    Lower GI bleed 07/15/2017    Altered mental status 07/15/2017    CKD (chronic kidney disease) stage 3, GFR 30-59 ml/min 07/15/2017    Urinary tract infection without hematuria 07/15/2017         Subjective: Interval notes reviewed, Sleeping, easily awakened but appears weak. Denies fever or chills, shortness of breath or pain.        Current Facility-Administered Medications   Medication Dose Route Frequency    potassium chloride (KLOR-CON) packet 40 mEq  40 mEq Oral BID WITH MEALS    0.45% sodium chloride with KCl 20 mEq/L infusion   IntraVENous CONTINUOUS    ceFAZolin (ANCEF) 2g IVPB in 50 mL D5W  2 g IntraVENous Q8H    pantoprazole (PROTONIX) tablet 40 mg  40 mg Oral DAILY    polyethylene glycol (MIRALAX) packet 17 g  17 g Oral DAILY    acetaminophen (TYLENOL) tablet 650 mg  650 mg Oral Q4H PRN    glucose chewable tablet 16 g  4 Tab Oral PRN    glucagon (GLUCAGEN) injection 1 mg  1 mg IntraMUSCular PRN    dextrose (D50W) injection syrg 12.5-25 g  25-50 mL IntraVENous PRN    insulin lispro (HUMALOG) injection   SubCUTAneous Q6H    sodium chloride (NS) flush 5-10 mL  5-10 mL IntraVENous PRN    0.9% sodium chloride infusion 250 mL  250 mL IntraVENous PRN    latanoprost (XALATAN) 0.005 % ophthalmic solution 1 Drop  1 Drop Both Eyes QHS    nitroglycerin (NITRODUR) 0.4 mg/hr patch 1 Patch  1 Patch TransDERmal DAILY    brimonidine (ALPHAGAN) 0.2 % ophthalmic solution 1 Drop  1 Drop Both Eyes BID    dorzolamide-timolol (COSOPT) 22.3-6.8 mg/mL ophthalmic solution 1 Drop  1 Drop Both Eyes BID    senna (SENOKOT) tablet 8.6 mg  1 Tab Oral BID    sodium chloride (NS) flush 5-10 mL  5-10 mL IntraVENous Q8H    sodium chloride (NS) flush 5-10 mL  5-10 mL IntraVENous PRN    ondansetron (ZOFRAN) injection 4 mg  4 mg IntraVENous Q6H PRN    0.9% sodium chloride infusion  75 mL/hr IntraVENous PRN         Objective:     Visit Vitals    /64    Pulse 98    Temp 98.1 °F (36.7 °C)    Resp 18    Ht 6' (1.829 m)    Wt 90.3 kg (199 lb 1.6 oz)    SpO2 99%    BMI 27 kg/m2       Temp (24hrs), Av.6 °F (37 °C), Min:98.1 °F (36.7 °C), Max:98.8 °F (37.1 °C)    GEN: drowsy elderly BM lying in bed NAD, rousable, non-verbal   HEENT: carious teeth  NECK: supple no LAD trach midline  CHEST: no rales rhonchi or wheeze  CVS:diffuse PMI, 11/VI murmur LLSB, RRR   ABD: soft non-tender (+) BS no organomegaly maher yellow urine  EXT: R knee effusion mild warmth  SKIN: no rash     Labs: Results:   Chemistry Recent Labs      17   0525  17   0537  17   0355   GLU  109*  127*  104*   NA  142  143  143   K  3.0*  3.3*  3.4*   CL  107  108  108   CO2  25  24  24   BUN  35*  43*  46*   CREA  1.55*  1.68*  1.86*   CA  9.1  9.6  9.5   AGAP  10  11  11   BUCR  23*  26*  25*   ALB  1.5*  1.7*  1.6*      CBC w/Diff No results for input(s): WBC, RBC, HGB, HCT, PLT, GRANS, LYMPH, EOS, HGBEXT, HCTEXT, PLTEXT, HGBEXT, HCTEXT, PLTEXT in the last 72 hours. R knee IMPRESSION:     1. No acute osseous finding.     2. Large joint effusion. R shoulder IMPRESSION:     1. No acute osseous finding.     2.  Degenerative changes as above with pronounced inferior acromial spurring,  usually associated with rotator cuff tear    Microbiology Results  Recent Labs      17   1013  17   1003  17   1640   CULT  NO GROWTH 2 DAYS  NO GROWTH 2 DAYS  NO GROWTH 3 DAYS       Russel Ruiz MD  2017  [facility] AT THE [facility]  [phone]

## 2017-07-24 NOTE — PROGRESS NOTES
Patient is unable to communicate at this time.  offered prayer and left Spiritual Care brochure. Chaplains will continue to follow and will provide pastoral care on an as needed/requested basis.     [address] [facility]   Staff [address] [facility]   [phone]

## 2017-07-24 NOTE — ROUTINE PROCESS
Bedside shift change report given to Akash Devi RN (oncoming nurse) by Paola Ricci RN (offgoing nurse). Report included the following information SBAR, Kardex, Intake/Output, MAR and Recent Results.

## 2017-07-24 NOTE — ROUTINE PROCESS
Bedside and Verbal shift change report given to 2[address] [facility] (oncoming nurse) by Ramesh Fortune (offgoing nurse). Report included the following information SBAR, Kardex, Intake/Output, MAR, Recent Results and Cardiac Rhythm Sinus Rhythm with 1st degree AV block and BBB. 1000 Pt resting in bed with no visible complaints of pain and no change to condition. Pt currently sleeping. Mepilex placed on pt's left lower leg underneath SCD. Pt has what appears to be an abrasion in that space. 1230 Report given to Maribell ALEXANDER. Report consisted of situation, background, assessment and recommendation.

## 2017-07-24 NOTE — ROUTINE PROCESS
Bedside and Verbal shift change report given to [address] [facility] S (oncoming nurse) by Onur Hernandez (offgoing nurse). Report included the following information SBAR, Kardex, Intake/Output, MAR, Recent Results and Cardiac Rhythm SR with 1st degree AV block and BBB.     0900 Pt sleeping in bed with no visible complaints of pain. Pt is stable. Will continue to monitor. 36 Visitor at bedside states he is a member of [employer]. Visitor desired to feed pt. Pt is stable. 1230 Pt sleeping in bed with no visible complaints of pain. Pt is stable. Will continue to monitor. 1500 Placed a 22g PIV to left outer forearm. IV flushed and is positive for blood return. Will continue to monitor. 1800 Pt sleeping in bed with no visible complaints of pain. Pt is stable. Will continue to monitor. Bedside and Verbal shift change report given to Onur Hernandez (oncoming nurse) by Breezy Dennis (offgoing nurse). Report included the following information SBAR, Kardex, Intake/Output, MAR, Recent Results and Cardiac Rhythm SR with a 1st degree AV block and BBB.

## 2017-07-24 NOTE — PROGRESS NOTES
Chart reviewed. Plan remains return to . Mallory Mccoy 29 when medically stable. Will cont to follow for further needs. Jada Hare RN,ext. [phone].

## 2017-07-25 LAB
ALBUMIN SERPL BCP-MCNC: 1.5 G/DL (ref 3.4–5)
ALBUMIN SERPL ELPH-MCNC: 1.8 G/DL (ref 2.9–4.4)
ALBUMIN/GLOB SERPL: 0.4 {RATIO} (ref 0.7–1.7)
ALPHA1 GLOB SERPL ELPH-MCNC: 0.4 G/DL (ref 0–0.4)
ALPHA2 GLOB SERPL ELPH-MCNC: 0.9 G/DL (ref 0.4–1)
ANION GAP BLD CALC-SCNC: 10 MMOL/L (ref 3–18)
B-GLOBULIN SERPL ELPH-MCNC: 1 G/DL (ref 0.7–1.3)
BUN SERPL-MCNC: 33 MG/DL (ref 7–18)
BUN/CREAT SERPL: 24 (ref 12–20)
CALCIUM SERPL-MCNC: 9.5 MG/DL (ref 8.5–10.1)
CHLORIDE SERPL-SCNC: 108 MMOL/L (ref 100–108)
CO2 SERPL-SCNC: 25 MMOL/L (ref 21–32)
CREAT SERPL-MCNC: 1.35 MG/DL (ref 0.6–1.3)
GAMMA GLOB SERPL ELPH-MCNC: 3.1 G/DL (ref 0.4–1.8)
GLOBULIN SER-MCNC: 5.4 G/DL (ref 2.2–3.9)
GLUCOSE BLD STRIP.AUTO-MCNC: 101 MG/DL (ref 70–110)
GLUCOSE BLD STRIP.AUTO-MCNC: 130 MG/DL (ref 70–110)
GLUCOSE BLD STRIP.AUTO-MCNC: 169 MG/DL (ref 70–110)
GLUCOSE SERPL-MCNC: 112 MG/DL (ref 74–99)
IGA SERPL-MCNC: 527 MG/DL (ref 61–437)
IGG SERPL-MCNC: 3371 MG/DL (ref 700–1600)
IGM SERPL-MCNC: 42 MG/DL (ref 15–143)
INTERPRETATION SERPL IEP-IMP: ABNORMAL
M PROTEIN SERPL ELPH-MCNC: ABNORMAL G/DL
PHOSPHATE SERPL-MCNC: 2.6 MG/DL (ref 2.5–4.9)
POTASSIUM SERPL-SCNC: 3.4 MMOL/L (ref 3.5–5.5)
PROT SERPL-MCNC: 7.2 G/DL (ref 6–8.5)
SODIUM SERPL-SCNC: 143 MMOL/L (ref 136–145)

## 2017-07-25 PROCEDURE — 74011250637 HC RX REV CODE- 250/637: Performed by: INTERNAL MEDICINE

## 2017-07-25 PROCEDURE — 65660000000 HC RM CCU STEPDOWN

## 2017-07-25 PROCEDURE — 74011250637 HC RX REV CODE- 250/637: Performed by: HOSPITALIST

## 2017-07-25 PROCEDURE — 74011250636 HC RX REV CODE- 250/636: Performed by: INTERNAL MEDICINE

## 2017-07-25 PROCEDURE — 36569 INSJ PICC 5 YR+ W/O IMAGING: CPT | Performed by: INTERNAL MEDICINE

## 2017-07-25 PROCEDURE — 82962 GLUCOSE BLOOD TEST: CPT

## 2017-07-25 PROCEDURE — 74011636637 HC RX REV CODE- 636/637: Performed by: HOSPITALIST

## 2017-07-25 PROCEDURE — 80069 RENAL FUNCTION PANEL: CPT | Performed by: INTERNAL MEDICINE

## 2017-07-25 PROCEDURE — 36415 COLL VENOUS BLD VENIPUNCTURE: CPT | Performed by: INTERNAL MEDICINE

## 2017-07-25 PROCEDURE — 76937 US GUIDE VASCULAR ACCESS: CPT | Performed by: INTERNAL MEDICINE

## 2017-07-25 RX ADMIN — INSULIN LISPRO 3 UNITS: 100 INJECTION, SOLUTION INTRAVENOUS; SUBCUTANEOUS at 12:42

## 2017-07-25 RX ADMIN — PANTOPRAZOLE SODIUM 40 MG: 40 TABLET, DELAYED RELEASE ORAL at 09:23

## 2017-07-25 RX ADMIN — POLYETHYLENE GLYCOL 3350 17 G: 17 POWDER, FOR SOLUTION ORAL at 09:23

## 2017-07-25 RX ADMIN — SODIUM CHLORIDE AND POTASSIUM CHLORIDE: 4.5; 1.49 INJECTION, SOLUTION INTRAVENOUS at 18:49

## 2017-07-25 RX ADMIN — CEFAZOLIN SODIUM 2 G: 2 SOLUTION INTRAVENOUS at 12:42

## 2017-07-25 RX ADMIN — CEFAZOLIN SODIUM 2 G: 2 SOLUTION INTRAVENOUS at 04:55

## 2017-07-25 RX ADMIN — SENNOSIDES 8.6 MG: 8.6 TABLET, FILM COATED ORAL at 18:40

## 2017-07-25 RX ADMIN — BRIMONIDINE TARTRATE 1 DROP: 2 SOLUTION OPHTHALMIC at 09:26

## 2017-07-25 RX ADMIN — Medication 10 ML: at 08:01

## 2017-07-25 RX ADMIN — Medication 10 ML: at 15:37

## 2017-07-25 RX ADMIN — SENNOSIDES 8.6 MG: 8.6 TABLET, FILM COATED ORAL at 09:27

## 2017-07-25 RX ADMIN — LATANOPROST 1 DROP: 50 SOLUTION OPHTHALMIC at 21:26

## 2017-07-25 RX ADMIN — DORZOLAMIDE HYDROCHLORIDE AND TIMOLOL MALEATE 1 DROP: 20; 5 SOLUTION/ DROPS OPHTHALMIC at 18:43

## 2017-07-25 RX ADMIN — DORZOLAMIDE HYDROCHLORIDE AND TIMOLOL MALEATE 1 DROP: 20; 5 SOLUTION/ DROPS OPHTHALMIC at 09:25

## 2017-07-25 RX ADMIN — INSULIN LISPRO 2 UNITS: 100 INJECTION, SOLUTION INTRAVENOUS; SUBCUTANEOUS at 00:53

## 2017-07-25 RX ADMIN — POTASSIUM CHLORIDE 40 MEQ: 1.5 POWDER, FOR SOLUTION ORAL at 09:23

## 2017-07-25 RX ADMIN — BRIMONIDINE TARTRATE 1 DROP: 2 SOLUTION OPHTHALMIC at 18:42

## 2017-07-25 RX ADMIN — CEFAZOLIN SODIUM 2 G: 2 SOLUTION INTRAVENOUS at 21:18

## 2017-07-25 RX ADMIN — Medication 10 ML: at 21:19

## 2017-07-25 RX ADMIN — SODIUM CHLORIDE AND POTASSIUM CHLORIDE: 4.5; 1.49 INJECTION, SOLUTION INTRAVENOUS at 05:57

## 2017-07-25 NOTE — PROGRESS NOTES
Bedside report received on pt while he is asleep, no signs of distress noted, bed in low position with call light within pt reach. Pt is mute but mouth word. [id number]: Incontinent care provided, pt tolerated well. Bedside shift change report given to Brie Goss RN (oncoming nurse) by Janie Maradiaga RN (offgoing nurse). Report included the following information SBAR, Kardex, Intake/Output, MAR and Recent Results.

## 2017-07-25 NOTE — PROGRESS NOTES
Infectious Disease Follow-up     Admit Date: 7/15/2017    Current abx Prior abx   Cefazolin 7/20-5 levaquin 7/18-2m ceftriaxone 7/15-3 vanco 7/17-2     Assessment ->Rec:     High grade MSSA BSI  - POA 2 of 2 bctx's 7/15, also 7/17 and 7/18 2 of 2 sets;   - 7/20 blcx still + x 2  -echo (TTE) neg x 2 for vegetation but with 5 of 6 positive concern for IE  - rpt blcx 7/22 NGTD x 2  - c/o R shoulder and R knee pain- may have chronic effusion R knee, no obvious STS R shoulder with post point tenderness, no reported back pain, no reproducible tenderness   -cxr ctap neg, feet ok -> monitor repeat bctx's from 7/22- so far ngtd  -> Continue Cefazolin x 6 weeks  -> doubt good surgical candidate with low EF multiple other comorbidities  -> OK to place PICC   R knee effusion  - c/o pain though min warmth c/w L  - s/p US guided arthrocentesis 7/20: 23 cc pink thin nonodorous fluid. gs few wbc, NOS. cx NGTD -> monitor   R shoulder pain- plain film s/o rotator cuff tear -> monitor    E coli UTI -ua adm sm francis est 4-6 wbc grew >100k E coli R amp -- initially treated ceftriaxone x 3 d, on levaquin, repeat uctx ng -> monitor    Sepsis with T 102.5 7/16, wbc 14.4 7/16- suspect d/t SA BSI ->monitor on abx    chf with  EF 0.15-0.20 this adm    guaic pos stool on admission on coumadin, improved colonic distention  ->per GI   HTN    NEPTALI on CKD3  Cr 2.2 today,  peak 4.7 7/18, was 2.6 2/17, 2.4 on adm -d/w Dr. Alec Ruiz today   DM2 A1c 6.5    Deaf mute     H/o gout     Increased globulin TP 8.3 alb 1.9 ?  Chronic inflammation vs monoclonal  ->await spep     MICROBIOLOGY:   7/15  bctx x 2 mssa   uctx >100k E coli R amp  7/16 uctx ng  7/17 bctx x 2 mssa  7/18 bctx x 2 mssa  7/20 bctx x 2 MSSA  7/22 blcx NGTD x 2    LINES AND CATHETERS:   piv     Active Hospital Problems    Diagnosis Date Noted    Systolic CHF, chronic ([facility] [address] 07/18/2017    Lower GI bleed 07/15/2017    Altered mental status 07/15/2017    CKD (chronic kidney disease) stage 3, GFR 30-59 ml/min 07/15/2017    Urinary tract infection without hematuria 07/15/2017         Subjective: Interval notes reviewed, Remains lethargic but arousable. Responds to some questions with nods.   Non-verbal.  Afebrile    Current Facility-Administered Medications   Medication Dose Route Frequency    0.45% sodium chloride with KCl 20 mEq/L infusion   IntraVENous CONTINUOUS    ceFAZolin (ANCEF) 2g IVPB in 50 mL D5W  2 g IntraVENous Q8H    pantoprazole (PROTONIX) tablet 40 mg  40 mg Oral DAILY    polyethylene glycol (MIRALAX) packet 17 g  17 g Oral DAILY    acetaminophen (TYLENOL) tablet 650 mg  650 mg Oral Q4H PRN    glucose chewable tablet 16 g  4 Tab Oral PRN    glucagon (GLUCAGEN) injection 1 mg  1 mg IntraMUSCular PRN    dextrose (D50W) injection syrg 12.5-25 g  25-50 mL IntraVENous PRN    insulin lispro (HUMALOG) injection   SubCUTAneous Q6H    sodium chloride (NS) flush 5-10 mL  5-10 mL IntraVENous PRN    0.9% sodium chloride infusion 250 mL  250 mL IntraVENous PRN    latanoprost (XALATAN) 0.005 % ophthalmic solution 1 Drop  1 Drop Both Eyes QHS    nitroglycerin (NITRODUR) 0.4 mg/hr patch 1 Patch  1 Patch TransDERmal DAILY    brimonidine (ALPHAGAN) 0.2 % ophthalmic solution 1 Drop  1 Drop Both Eyes BID    dorzolamide-timolol (COSOPT) 22.3-6.8 mg/mL ophthalmic solution 1 Drop  1 Drop Both Eyes BID    senna (SENOKOT) tablet 8.6 mg  1 Tab Oral BID    sodium chloride (NS) flush 5-10 mL  5-10 mL IntraVENous Q8H    sodium chloride (NS) flush 5-10 mL  5-10 mL IntraVENous PRN    ondansetron (ZOFRAN) injection 4 mg  4 mg IntraVENous Q6H PRN    0.9% sodium chloride infusion  75 mL/hr IntraVENous PRN         Objective:     Visit Vitals    /71 (BP 1 Location: Right arm, BP Patient Position: At rest)    Pulse 99    Temp 98.1 °F (36.7 °C)    Resp 18    Ht 6' (1.829 m)    Wt 93.7 kg (206 lb 8 oz)    SpO2 98%    BMI 28.01 kg/m2       Temp (24hrs), Av °F (36.7 °C), Min:97.6 °F (36.4 °C), Max:98.3 °F (36.8 °C)    GEN: drowsy elderly BM lying in bed NAD, rousable, non-verbal   HEENT: carious teeth  NECK: supple no LAD trach midline  CHEST: no rales rhonchi or wheeze  CVS: regular, I/VI murmur LLSB, RRR   ABD: soft non-tender (+) BS no organomegaly maher yellow urine  EXT: R knee effusion no warmth  SKIN: no rash     Labs: Results:   Chemistry Recent Labs      07/25/17   0440  07/24/17   0525  07/23/17   0537   GLU  112*  109*  127*   NA  143  142  143   K  3.4*  3.0*  3.3*   CL  108  107  108   CO2  25  25  24   BUN  33*  35*  43*   CREA  1.35*  1.55*  1.68*   CA  9.5  9.1  9.6   AGAP  10  10  11   BUCR  24*  23*  26*   ALB  1.5*  1.5*  1.7*      CBC w/Diff No results for input(s): WBC, RBC, HGB, HCT, PLT, GRANS, LYMPH, EOS, HGBEXT, HCTEXT, PLTEXT, HGBEXT, HCTEXT, PLTEXT in the last 72 hours. R knee IMPRESSION:     1. No acute osseous finding.     2. Large joint effusion. R shoulder IMPRESSION:     1. No acute osseous finding.     2. Degenerative changes as above with pronounced inferior acromial spurring,  usually associated with rotator cuff tear    Microbiology Results  No results for input(s): SDES, CULT in the last 72 hours.     Senora Goldmann, MD  July 25, 2017  [facility] AT THE [facility]  [phone]

## 2017-07-25 NOTE — PROGRESS NOTES
RENAL PROGRESS NOTE        [place]         Assessment/Plan:   · NEPTALI ( ischemic atn in a setting of uti/sepsis/gi bleeding). Non oliguric, scr is improving. oral intake is fair, may d/c ivf  · CKD 3 due to presumably dm/htn. · Hypokalemia. Replace. Hyperkalemia has resolved. · Hypernatremia. Improved  · MSSA high grade bacteriemia. Source? Abx per ID. May Need TONYA  · E. Coli uti. · HTN. Stable off medications. · Systolic chf. No overt decompensation. Please call with questions    Servando Chappell MD FASN  Cell [phone]                     Subjective:    Non verbal.   NAD      Patient Active Problem List   Diagnosis Code    Acute upper GI bleed K92.2    NEPTALI (acute kidney injury) ([facility] [address] N17.9    Febrile illness, acute R50.9    Lower GI bleed K92.2    Altered mental status R41.82    CKD (chronic kidney disease) stage 3, GFR 30-59 ml/min N18.3    Urinary tract infection without hematuria P16.9    Systolic CHF, chronic (HCC) I50.22       Current Facility-Administered Medications   Medication Dose Route Frequency Provider Last Rate Last Dose    0.45% sodium chloride with KCl 20 mEq/L infusion   IntraVENous CONTINUOUS Meryl PAZ MD 75 mL/hr at 07/25/17 0557      ceFAZolin (ANCEF) 2g IVPB in 50 mL D5W  2 g IntraVENous Q8H CLINTON Walden  mL/hr at 07/25/17 1242 2 g at 07/25/17 1242    pantoprazole (PROTONIX) tablet 40 mg  40 mg Oral DAILY Morgan Evans MD   40 mg at 07/25/17 5963    polyethylene glycol (MIRALAX) packet 17 g  17 g Oral DAILY Silvestre Gonzalez MD   17 g at 07/25/17 6296    acetaminophen (TYLENOL) tablet 650 mg  650 mg Oral Q4H PRN Sandy Coupe, DO        glucose chewable tablet 16 g  4 Tab Oral PRN Sandy Coupe, DO        glucagon (GLUCAGEN) injection 1 mg  1 mg IntraMUSCular PRN Sandy Coupe, DO        dextrose (D50W) injection syrg 12.5-25 g  25-50 mL IntraVENous PRN Sandy Coupe, DO        insulin lispro (HUMALOG) injection   SubCUTAneous Q6H [place], DO   3 Units at 07/25/17 1242    sodium chloride (NS) flush 5-10 mL  5-10 mL IntraVENous PRN Mary Alvarado MD        0.9% sodium chloride infusion 250 mL  250 mL IntraVENous PRN Mary Alvarado MD        latanoprost (XALATAN) 0.005 % ophthalmic solution 1 Drop  1 Drop Both Eyes QHS Abraham Renee MD   1 Drop at 07/24/17 2217    nitroglycerin (NITRODUR) 0.4 mg/hr patch 1 Patch  1 Patch TransDERmal DAILY Abraham Renee MD   1 Patch at 07/25/17 0928    brimonidine (ALPHAGAN) 0.2 % ophthalmic solution 1 Drop  1 Drop Both Eyes BID Abraham Renee MD   1 Drop at 07/25/17 0926    dorzolamide-timolol (COSOPT) 22.3-6.8 mg/mL ophthalmic solution 1 Drop  1 Drop Both Eyes BID Abraham Renee MD   1 Drop at 07/25/17 0925    senna (SENOKOT) tablet 8.6 mg  1 Tab Oral BID Abraham Renee MD   8.6 mg at 07/25/17 2425    sodium chloride (NS) flush 5-10 mL  5-10 mL IntraVENous Q8H Abraham Renee MD   10 mL at 07/25/17 1537    sodium chloride (NS) flush 5-10 mL  5-10 mL IntraVENous PRN Abraham Renee MD        ondansetron [facility]) injection 4 mg  4 mg IntraVENous Q6H PRN Abraham Renee MD        0.9% sodium chloride infusion  75 mL/hr IntraVENous PRN Abraham Renee MD           Objective  Vitals:    07/25/17 0619 07/25/17 1025 07/25/17 1430 07/25/17 1733   BP: 132/74 117/71 116/69 116/64   Pulse: 89 99 98 88   Resp: 18 18 18 18   Temp: 97.6 °F (36.4 °C) 98.1 °F (36.7 °C) 98.1 °F (36.7 °C) 98.1 °F (36.7 °C)   SpO2: 97% 98% 98% 98%   Weight:       Height:             Intake/Output Summary (Last 24 hours) at 07/25/17 1735  Last data filed at 07/25/17 1009   Gross per 24 hour   Intake              950 ml   Output              525 ml   Net              425 ml           Admission weight: Weight: 89.4 kg (197 lb) (07/15/17 1530)  Last Weight Metrics:  Weight Loss Metrics 7/25/2017 2/17/2017 3/15/2016   Today's Wt 206 lb 8 oz 205 lb 1.6 oz 223 lb   BMI 28.01 kg/m2 29.43 kg/m2 30.24 kg/m2             Physical Assessment:     General: awake  Neck: No jvd. LUNGS: Clear to Auscultation, No rales, rhonchi or wheezes. CVS EXM: S1, S2  RRR, no murmurs/gallops/rubs. Abdomen: soft, non tender. Lower Extremities:  no edema. Lab    CBC w/Diff No results for input(s): WBC, RBC, HGB, HCT, PLT, GRANS, LYMPH, EOS, HGBEXT, HCTEXT, PLTEXT, HGBEXT, HCTEXT, PLTEXT in the last 72 hours.      Chemistry Recent Labs      07/25/17   0440  07/24/17   0525  07/23/17   0537   GLU  112*  109*  127*   NA  143  142  143   K  3.4*  3.0*  3.3*   CL  108  107  108   CO2  25  25  24   BUN  33*  35*  43*   CREA  1.35*  1.55*  1.68*   CA  9.5  9.1  9.6   AGAP  10  10  11   BUCR  24*  23*  26*   ALB  1.5*  1.5*  1.7*   PHOS  2.6  2.5  2.9         Lab Results   Component Value Date/Time    Iron 15 07/17/2017 03:50 AM    TIBC 181 07/17/2017 03:50 AM    Iron % saturation 8 07/17/2017 03:50 AM    Ferritin 12 03/15/2016 09:50 AM      Lab Results   Component Value Date/Time    Calcium 9.5 07/25/2017 04:40 AM    Phosphorus 2.6 07/25/2017 04:40 AM

## 2017-07-25 NOTE — PROGRESS NOTES
Progress Note      Patient: Bebe Acevedo               Sex: male          DOA: 7/15/2017       YOB: 1945      Age:  67 y.o.        LOS:  LOS: 10 days               Subjective:   Pt has had a 2 d echo and his ef is 15 % he does not seem to be in failure at the present time . As per gi note  The pt's  Ct scan shows that the  colon has undergone significant decompression . The pt's abdomen is presently soft and nontender to palpation and he has bowel sounds . The kub today shows mild colonic dilation . He had NGT and this is now resolved. His troponin is 0.13. it was 0.24 in the past couple of days they are flat and likely from NEPTALI. The creatinine is improving . Patient with MSSA bacteremia unknown source. Negative TTE, negative knee tap. Last set of bc negative. Awake, alert lying in bed    Objective:      Visit Vitals    /71 (BP 1 Location: Right arm, BP Patient Position: At rest)    Pulse 99    Temp 98.1 °F (36.7 °C)    Resp 18    Ht 6' (1.829 m)    Wt 93.7 kg (206 lb 8 oz)    SpO2 98%    BMI 28.01 kg/m2       Physical Exam:  Pt is a deaf mute . reads lips however. Heart reg rate and rhythm   Lungs good breath sounds heard   Abdomen soft bowel sounds heard   Neuro moves all extremities .      Lab/Data Reviewed:  CMP:   Lab Results   Component Value Date/Time     07/25/2017 04:40 AM    K 3.4 (L) 07/25/2017 04:40 AM     07/25/2017 04:40 AM    CO2 25 07/25/2017 04:40 AM    AGAP 10 07/25/2017 04:40 AM     (H) 07/25/2017 04:40 AM    BUN 33 (H) 07/25/2017 04:40 AM    CREA 1.35 (H) 07/25/2017 04:40 AM    GFRAA >60 07/25/2017 04:40 AM    GFRNA 52 (L) 07/25/2017 04:40 AM    CA 9.5 07/25/2017 04:40 AM    PHOS 2.6 07/25/2017 04:40 AM    ALB 1.5 (L) 07/25/2017 04:40 AM     CBC:   No results found for: WBC, HGB, HGBEXT, HCT, HCTEXT, PLT, PLTEXT, HGBEXT, HCTEXT, PLTEXT        Assessment/Plan     Principal Problem:    Lower GI bleed (7/15/2017)    Active Problems: Altered mental status (7/15/2017)      CKD (chronic kidney disease) stage 3, GFR 30-59 ml/min (7/15/2017)      Urinary tract infection without hematuria (0/78/8482)      Systolic CHF, chronic (HCC) (7/18/2017)        Plan:    NEPTALI on chronic CKD-- resolved on IVF and off Diuretics   Colonic pseudoobstruction- s/p NGT , improving, dental soft diet  Staph aureus bacteremia-  Unclear source sensitive , cefazolin,  IR US asp of R knee fluid. Cx , gramstain cell count, does not appear to be a likely source at this time.    ID following given risk and comorbids better to simply treat with PICC and IV abx

## 2017-07-25 NOTE — ROUTINE PROCESS
0745 Report received from [facility]. Patient alert. Condom cath was out. Incontinent care done. 1000 SPA team at bedside. 1242 Insulin given for elevated blood sugar. 1630 Patient resting comfortably in the bed. 1940 Bedside and Verbal shift change report given to Kerry Macario RN (oncoming nurse) by Hilario Cody RN (offgoing nurse). Report included the following information SBAR, Kardex, Intake/Output, MAR, Recent Results and Cardiac Rhythm NSR first degree AV Block.

## 2017-07-26 LAB
ALBUMIN SERPL BCP-MCNC: 1.5 G/DL (ref 3.4–5)
ANION GAP BLD CALC-SCNC: 8 MMOL/L (ref 3–18)
BACTERIA SPEC CULT: NORMAL
BUN SERPL-MCNC: 28 MG/DL (ref 7–18)
BUN/CREAT SERPL: 22 (ref 12–20)
CALCIUM SERPL-MCNC: 8.9 MG/DL (ref 8.5–10.1)
CHLORIDE SERPL-SCNC: 108 MMOL/L (ref 100–108)
CO2 SERPL-SCNC: 25 MMOL/L (ref 21–32)
CREAT SERPL-MCNC: 1.28 MG/DL (ref 0.6–1.3)
GLUCOSE BLD STRIP.AUTO-MCNC: 103 MG/DL (ref 70–110)
GLUCOSE BLD STRIP.AUTO-MCNC: 110 MG/DL (ref 70–110)
GLUCOSE BLD STRIP.AUTO-MCNC: 112 MG/DL (ref 70–110)
GLUCOSE BLD STRIP.AUTO-MCNC: 213 MG/DL (ref 70–110)
GLUCOSE SERPL-MCNC: 103 MG/DL (ref 74–99)
GRAM STN SPEC: NORMAL
GRAM STN SPEC: NORMAL
PHOSPHATE SERPL-MCNC: 2.7 MG/DL (ref 2.5–4.9)
POTASSIUM SERPL-SCNC: 3.4 MMOL/L (ref 3.5–5.5)
SERVICE CMNT-IMP: NORMAL
SODIUM SERPL-SCNC: 141 MMOL/L (ref 136–145)

## 2017-07-26 PROCEDURE — 74011250637 HC RX REV CODE- 250/637: Performed by: HOSPITALIST

## 2017-07-26 PROCEDURE — 74011250637 HC RX REV CODE- 250/637: Performed by: INTERNAL MEDICINE

## 2017-07-26 PROCEDURE — 80069 RENAL FUNCTION PANEL: CPT | Performed by: INTERNAL MEDICINE

## 2017-07-26 PROCEDURE — 36415 COLL VENOUS BLD VENIPUNCTURE: CPT | Performed by: INTERNAL MEDICINE

## 2017-07-26 PROCEDURE — 02HV33Z INSERTION OF INFUSION DEVICE INTO SUPERIOR VENA CAVA, PERCUTANEOUS APPROACH: ICD-10-PCS | Performed by: HOSPITALIST

## 2017-07-26 PROCEDURE — 77030018719 HC DRSG PTCH ANTIMIC J&J -A

## 2017-07-26 PROCEDURE — 77010033678 HC OXYGEN DAILY

## 2017-07-26 PROCEDURE — 77030020256 HC SOL INJ NACL 0.9%  500ML

## 2017-07-26 PROCEDURE — 82962 GLUCOSE BLOOD TEST: CPT

## 2017-07-26 PROCEDURE — 74011636637 HC RX REV CODE- 636/637: Performed by: HOSPITALIST

## 2017-07-26 PROCEDURE — 74011250636 HC RX REV CODE- 250/636: Performed by: INTERNAL MEDICINE

## 2017-07-26 PROCEDURE — C1751 CATH, INF, PER/CENT/MIDLINE: HCPCS

## 2017-07-26 PROCEDURE — 65660000000 HC RM CCU STEPDOWN

## 2017-07-26 RX ORDER — SODIUM CHLORIDE 0.9 % (FLUSH) 0.9 %
10-40 SYRINGE (ML) INJECTION EVERY 8 HOURS
Status: DISCONTINUED | OUTPATIENT
Start: 2017-07-26 | End: 2017-07-28 | Stop reason: HOSPADM

## 2017-07-26 RX ORDER — BACITRACIN 500 UNIT/G
1 PACKET (EA) TOPICAL AS NEEDED
Status: DISCONTINUED | OUTPATIENT
Start: 2017-07-26 | End: 2017-07-28 | Stop reason: HOSPADM

## 2017-07-26 RX ORDER — SODIUM CHLORIDE 0.9 % (FLUSH) 0.9 %
10 SYRINGE (ML) INJECTION AS NEEDED
Status: DISCONTINUED | OUTPATIENT
Start: 2017-07-26 | End: 2017-07-28 | Stop reason: HOSPADM

## 2017-07-26 RX ORDER — SODIUM CHLORIDE 0.9 % (FLUSH) 0.9 %
10-30 SYRINGE (ML) INJECTION AS NEEDED
Status: DISCONTINUED | OUTPATIENT
Start: 2017-07-26 | End: 2017-07-28 | Stop reason: HOSPADM

## 2017-07-26 RX ORDER — POTASSIUM CHLORIDE 20 MEQ/1
20 TABLET, EXTENDED RELEASE ORAL
Status: COMPLETED | OUTPATIENT
Start: 2017-07-26 | End: 2017-07-26

## 2017-07-26 RX ORDER — SODIUM CHLORIDE 0.9 % (FLUSH) 0.9 %
10 SYRINGE (ML) INJECTION EVERY 24 HOURS
Status: DISCONTINUED | OUTPATIENT
Start: 2017-07-26 | End: 2017-07-28 | Stop reason: HOSPADM

## 2017-07-26 RX ADMIN — CEFAZOLIN SODIUM 2 G: 2 SOLUTION INTRAVENOUS at 16:42

## 2017-07-26 RX ADMIN — POTASSIUM CHLORIDE 20 MEQ: 20 TABLET, EXTENDED RELEASE ORAL at 16:50

## 2017-07-26 RX ADMIN — Medication 10 ML: at 14:00

## 2017-07-26 RX ADMIN — LATANOPROST 1 DROP: 50 SOLUTION OPHTHALMIC at 23:32

## 2017-07-26 RX ADMIN — INSULIN LISPRO 6 UNITS: 100 INJECTION, SOLUTION INTRAVENOUS; SUBCUTANEOUS at 17:55

## 2017-07-26 RX ADMIN — BRIMONIDINE TARTRATE 1 DROP: 2 SOLUTION OPHTHALMIC at 09:00

## 2017-07-26 RX ADMIN — DORZOLAMIDE HYDROCHLORIDE AND TIMOLOL MALEATE 1 DROP: 20; 5 SOLUTION/ DROPS OPHTHALMIC at 18:00

## 2017-07-26 RX ADMIN — Medication 10 ML: at 23:33

## 2017-07-26 RX ADMIN — DORZOLAMIDE HYDROCHLORIDE AND TIMOLOL MALEATE 1 DROP: 20; 5 SOLUTION/ DROPS OPHTHALMIC at 09:00

## 2017-07-26 RX ADMIN — CEFAZOLIN SODIUM 2 G: 2 SOLUTION INTRAVENOUS at 05:18

## 2017-07-26 RX ADMIN — SENNOSIDES 8.6 MG: 8.6 TABLET, FILM COATED ORAL at 11:37

## 2017-07-26 RX ADMIN — BRIMONIDINE TARTRATE 1 DROP: 2 SOLUTION OPHTHALMIC at 18:00

## 2017-07-26 RX ADMIN — Medication 10 ML: at 12:00

## 2017-07-26 RX ADMIN — PANTOPRAZOLE SODIUM 40 MG: 40 TABLET, DELAYED RELEASE ORAL at 11:37

## 2017-07-26 RX ADMIN — Medication 10 ML: at 07:38

## 2017-07-26 RX ADMIN — CEFAZOLIN SODIUM 2 G: 2 SOLUTION INTRAVENOUS at 23:32

## 2017-07-26 NOTE — ROUTINE PROCESS
0740  Bedside and Verbal shift change report given to Aidan Escobar (oncoming nurse) by Danuta Zhang (offgoing nurse). Report included the following information SBAR, Kardex, Intake/Output and MAR.     0930  Shift assessment complete and due meds given. Pt drowsy but responsive and nodding yes when asking about comfort. No discomfort noted    1500  Reassessment complete. No change in pt condition    1920  Bedside and Verbal shift change report given to Bridget Mccabe (oncoming nurse) by Aidan Escobar (offgoing nurse). Report included the following information SBAR, Kardex, Intake/Output and MAR.

## 2017-07-26 NOTE — PROCEDURES
PICC line inserted RUE. Tip placement verified SVC/CAJ using 3CG technology and is ready to use.  Reported to [facility]

## 2017-07-26 NOTE — PROGRESS NOTES
Infectious Disease Follow-up     Admit Date: 7/15/2017    Current abx Prior abx   Cefazolin 7/20-6 levaquin 7/18-2m ceftriaxone 7/15-3 vanco 7/17-2     Assessment ->Rec:     High grade MSSA BSI  - POA 2 of 2 bctx's 7/15, also 7/17 and 7/18 2 of 2 sets;   - 7/20 blcx still + x 2  -echo (TTE) neg x 2 for vegetation but with 5 of 6 positive concern for IE  - rpt blcx 7/22 NGTD x 2  - c/o R shoulder and R knee pain- may have chronic effusion R knee, no obvious STS R shoulder with post point tenderness, no reported back pain, no reproducible tenderness   -cxr ctap neg, feet ok -> monitor repeat bctx's from 7/22- so far ngtd  -> Continue Cefazolin x 6 weeks  -> doubt good surgical candidate with low EF multiple other comorbidities     R knee effusion  - c/o pain though min warmth c/w L  - s/p US guided arthrocentesis 7/20: 23 cc pink thin nonodorous fluid. gs few wbc, NOS. cx NGTD -> monitor   R shoulder pain- plain film s/o rotator cuff tear -> monitor    E coli UTI -ua adm sm francis est 4-6 wbc grew >100k E coli R amp -- initially treated ceftriaxone x 3 d, on levaquin, repeat uctx ng -> monitor    Sepsis with T 102.5 7/16, wbc 14.4 7/16- suspect d/t SA BSI ->monitor on abx    chf with  EF 0.15-0.20 this adm    guaic pos stool on admission on coumadin, improved colonic distention  ->per GI   HTN    NEPTALI on CKD3  Cr 2.2 today,  peak 4.7 7/18, was 2.6 2/17, 2.4 on adm -d/w Dr. Sanches Reason today   DM2 A1c 6.5    Deaf mute     H/o gout     Increased globulin TP 8.3 alb 1.9 ?  Chronic inflammation vs monoclonal  ->await spep     MICROBIOLOGY:   7/15  bctx x 2 mssa   uctx >100k E coli R amp  7/16 uctx ng  7/17 bctx x 2 mssa  7/18 bctx x 2 mssa  7/20 bctx x 2 MSSA  7/22 blcx NGTD x 2    LINES AND CATHETERS:   PICC RUE 7/26    Active Hospital Problems    Diagnosis Date Noted    Systolic CHF, chronic ([facility] [address] 07/18/2017    Lower GI bleed 07/15/2017    Altered mental status 07/15/2017    CKD (chronic kidney disease) stage 3, GFR 30-59 ml/min 07/15/2017    Urinary tract infection without hematuria 07/15/2017         Subjective: Interval notes reviewed, Awake, nods to some questions.  Still non-verbal  PICC just placed in right arm    Current Facility-Administered Medications   Medication Dose Route Frequency    sodium chloride (NS) flush 10-30 mL  10-30 mL InterCATHeter PRN    sodium chloride (NS) flush 10 mL  10 mL InterCATHeter Q24H    sodium chloride (NS) flush 10 mL  10 mL InterCATHeter PRN    sodium chloride (NS) flush 10-40 mL  10-40 mL InterCATHeter Q8H    bacitracin 500 unit/gram packet 1 Packet  1 Packet Topical PRN    0.45% sodium chloride with KCl 20 mEq/L infusion   IntraVENous CONTINUOUS    ceFAZolin (ANCEF) 2g IVPB in 50 mL D5W  2 g IntraVENous Q8H    pantoprazole (PROTONIX) tablet 40 mg  40 mg Oral DAILY    polyethylene glycol (MIRALAX) packet 17 g  17 g Oral DAILY    acetaminophen (TYLENOL) tablet 650 mg  650 mg Oral Q4H PRN    glucose chewable tablet 16 g  4 Tab Oral PRN    glucagon (GLUCAGEN) injection 1 mg  1 mg IntraMUSCular PRN    dextrose (D50W) injection syrg 12.5-25 g  25-50 mL IntraVENous PRN    insulin lispro (HUMALOG) injection   SubCUTAneous Q6H    sodium chloride (NS) flush 5-10 mL  5-10 mL IntraVENous PRN    0.9% sodium chloride infusion 250 mL  250 mL IntraVENous PRN    latanoprost (XALATAN) 0.005 % ophthalmic solution 1 Drop  1 Drop Both Eyes QHS    nitroglycerin (NITRODUR) 0.4 mg/hr patch 1 Patch  1 Patch TransDERmal DAILY    brimonidine (ALPHAGAN) 0.2 % ophthalmic solution 1 Drop  1 Drop Both Eyes BID    dorzolamide-timolol (COSOPT) 22.3-6.8 mg/mL ophthalmic solution 1 Drop  1 Drop Both Eyes BID    senna (SENOKOT) tablet 8.6 mg  1 Tab Oral BID    sodium chloride (NS) flush 5-10 mL  5-10 mL IntraVENous Q8H    sodium chloride (NS) flush 5-10 mL  5-10 mL IntraVENous PRN    ondansetron (ZOFRAN) injection 4 mg  4 mg IntraVENous Q6H PRN    0.9% sodium chloride infusion  75 mL/hr IntraVENous PRN         Objective:     Visit Vitals    /72 (BP 1 Location: Right arm, BP Patient Position: At rest)    Pulse 95    Temp 98 °F (36.7 °C)    Resp 18    Ht 6' (1.829 m)    Wt 92.4 kg (203 lb 11.3 oz)    SpO2 95%    BMI 27.63 kg/m2       Temp (24hrs), Av.8 °F (36.6 °C), Min:97.4 °F (36.3 °C), Max:98.1 °F (36.7 °C)    GEN: drowsy elderly BM lying in bed NAD, more alert, non-verbal   HEENT: carious teeth  NECK: supple no LAD trach midline  CHEST: no rales rhonchi or wheeze  CVS: regular,II/VI murmur LLSB/base, RRR   ABD: soft non-tender (+) BS no organomegaly maher yellow urine  EXT: R knee effusion no warmth  SKIN: no rash     Labs: Results:   Chemistry Recent Labs      17   0520  17   0440  17   0525   GLU  103*  112*  109*   NA  141  143  142   K  3.4*  3.4*  3.0*   CL  108  108  107   CO2  25  25  25   BUN  28*  33*  35*   CREA  1.28  1.35*  1.55*   CA  8.9  9.5  9.1   AGAP  8  10  10   BUCR  22*  24*  23*   ALB  1.5*  1.5*  1.5*      CBC w/Diff No results for input(s): WBC, RBC, HGB, HCT, PLT, GRANS, LYMPH, EOS, HGBEXT, HCTEXT, PLTEXT, HGBEXT, HCTEXT, PLTEXT in the last 72 hours. R knee IMPRESSION:     1. No acute osseous finding.     2. Large joint effusion. R shoulder IMPRESSION:     1. No acute osseous finding.     2. Degenerative changes as above with pronounced inferior acromial spurring,  usually associated with rotator cuff tear    Microbiology Results  No results for input(s): SDES, CULT in the last 72 hours.     Migue Conroy MD  2017  [facility] AT THE [facility]  [phone]

## 2017-07-26 NOTE — ROUTINE PROCESS
Bedside and Verbal shift change report given to Geronimo Islas RN (oncoming nurse) by Lena Montana RN (offgoing nurse). Report included the following information SBAR, Kardex and MAR. Patient had no acute events overnight. Currently resting in NAD. No express needs reported at this time.

## 2017-07-26 NOTE — PROGRESS NOTES
Renal fxn at baseline  k a little low  Give a dose of kcl  Will see tomorrow  Please call with any ?

## 2017-07-26 NOTE — PROGRESS NOTES
Progress Note      Patient: Miriam Blunt               Sex: male          DOA: 7/15/2017       YOB: 1945      Age:  67 y.o.        LOS:  LOS: 11 days               Subjective:   Pt is on cefazolin as per id pt is deaf and is mute . He does lip read to some extent . His h/h is stable at 13.5/43. 9.his albumin is low at 1.5 .will need to feed the pt .  Will discuss with nutrition        Objective:      Visit Vitals    /75 (BP 1 Location: Left arm, BP Patient Position: At rest;Head of bed elevated (Comment degrees))    Pulse 100    Temp 97.5 °F (36.4 °C)    Resp 16    Ht 6' (1.829 m)    Wt 92.4 kg (203 lb 11.3 oz)    SpO2 98%    BMI 27.63 kg/m2       Physical Exam:  Pt is awake and is responsive   Heart reg rate and rhythm   Lungs fair breath sounds heard   Abdomen soft  No pain on palpation   Neuro no change         Lab/Data Reviewed:  CMP:   Lab Results   Component Value Date/Time     07/26/2017 05:20 AM    K 3.4 (L) 07/26/2017 05:20 AM     07/26/2017 05:20 AM    CO2 25 07/26/2017 05:20 AM    AGAP 8 07/26/2017 05:20 AM     (H) 07/26/2017 05:20 AM    BUN 28 (H) 07/26/2017 05:20 AM    CREA 1.28 07/26/2017 05:20 AM    GFRAA >60 07/26/2017 05:20 AM    GFRNA 55 (L) 07/26/2017 05:20 AM    CA 8.9 07/26/2017 05:20 AM    PHOS 2.7 07/26/2017 05:20 AM    ALB 1.5 (L) 07/26/2017 05:20 AM     CBC: No results found for: WBC, HGB, HGBEXT, HCT, HCTEXT, PLT, PLTEXT, HGBEXT, HCTEXT, PLTEXT        Assessment/Plan     Principal Problem:    Lower GI bleed (7/15/2017)    Active Problems:    Altered mental status (7/15/2017)      CKD (chronic kidney disease) stage 3, GFR 30-59 ml/min (7/15/2017)      Urinary tract infection without hematuria (8/52/8086)      Systolic CHF, chronic ([facility] [address] (7/18/2017)        Plan:pt will be sen back to the snf but will need to discuss nutrition as his albumin is 1.5

## 2017-07-27 LAB
ALBUMIN SERPL BCP-MCNC: 1.5 G/DL (ref 3.4–5)
ALBUMIN/GLOB SERPL: 0.3 {RATIO} (ref 0.8–1.7)
ALP SERPL-CCNC: 166 U/L (ref 45–117)
ALT SERPL-CCNC: <6 U/L (ref 16–61)
ANION GAP BLD CALC-SCNC: 8 MMOL/L (ref 3–18)
AST SERPL W P-5'-P-CCNC: 32 U/L (ref 15–37)
BASOPHILS # BLD AUTO: 0 K/UL (ref 0–0.06)
BASOPHILS # BLD: 0 % (ref 0–2)
BILIRUB SERPL-MCNC: 0.8 MG/DL (ref 0.2–1)
BUN SERPL-MCNC: 26 MG/DL (ref 7–18)
BUN/CREAT SERPL: 20 (ref 12–20)
CALCIUM SERPL-MCNC: 8.9 MG/DL (ref 8.5–10.1)
CHLORIDE SERPL-SCNC: 107 MMOL/L (ref 100–108)
CO2 SERPL-SCNC: 28 MMOL/L (ref 21–32)
CREAT SERPL-MCNC: 1.3 MG/DL (ref 0.6–1.3)
DIFFERENTIAL METHOD BLD: ABNORMAL
EOSINOPHIL # BLD: 0.1 K/UL (ref 0–0.4)
EOSINOPHIL NFR BLD: 1 % (ref 0–5)
ERYTHROCYTE [DISTWIDTH] IN BLOOD BY AUTOMATED COUNT: 17.2 % (ref 11.6–14.5)
GLOBULIN SER CALC-MCNC: 5.9 G/DL (ref 2–4)
GLUCOSE BLD STRIP.AUTO-MCNC: 122 MG/DL (ref 70–110)
GLUCOSE BLD STRIP.AUTO-MCNC: 165 MG/DL (ref 70–110)
GLUCOSE BLD STRIP.AUTO-MCNC: 177 MG/DL (ref 70–110)
GLUCOSE BLD STRIP.AUTO-MCNC: 92 MG/DL (ref 70–110)
GLUCOSE SERPL-MCNC: 115 MG/DL (ref 74–99)
HCT VFR BLD AUTO: 35.5 % (ref 36–48)
HGB BLD-MCNC: 10.8 G/DL (ref 13–16)
LYMPHOCYTES # BLD AUTO: 9 % (ref 21–52)
LYMPHOCYTES # BLD: 0.8 K/UL (ref 0.9–3.6)
MCH RBC QN AUTO: 25.1 PG (ref 24–34)
MCHC RBC AUTO-ENTMCNC: 30.4 G/DL (ref 31–37)
MCV RBC AUTO: 82.4 FL (ref 74–97)
MONOCYTES # BLD: 0.9 K/UL (ref 0.05–1.2)
MONOCYTES NFR BLD AUTO: 10 % (ref 3–10)
NEUTS SEG # BLD: 7.2 K/UL (ref 1.8–8)
NEUTS SEG NFR BLD AUTO: 80 % (ref 40–73)
PLATELET # BLD AUTO: 261 K/UL (ref 135–420)
PMV BLD AUTO: 9.8 FL (ref 9.2–11.8)
POTASSIUM SERPL-SCNC: 3.8 MMOL/L (ref 3.5–5.5)
PROT SERPL-MCNC: 7.4 G/DL (ref 6.4–8.2)
RBC # BLD AUTO: 4.31 M/UL (ref 4.7–5.5)
SODIUM SERPL-SCNC: 143 MMOL/L (ref 136–145)
WBC # BLD AUTO: 8.9 K/UL (ref 4.6–13.2)

## 2017-07-27 PROCEDURE — 74011250637 HC RX REV CODE- 250/637: Performed by: INTERNAL MEDICINE

## 2017-07-27 PROCEDURE — 74011250637 HC RX REV CODE- 250/637: Performed by: HOSPITALIST

## 2017-07-27 PROCEDURE — 82962 GLUCOSE BLOOD TEST: CPT

## 2017-07-27 PROCEDURE — 65660000000 HC RM CCU STEPDOWN

## 2017-07-27 PROCEDURE — 85025 COMPLETE CBC W/AUTO DIFF WBC: CPT | Performed by: HOSPITALIST

## 2017-07-27 PROCEDURE — 80053 COMPREHEN METABOLIC PANEL: CPT | Performed by: HOSPITALIST

## 2017-07-27 PROCEDURE — 74011250636 HC RX REV CODE- 250/636: Performed by: HOSPITALIST

## 2017-07-27 PROCEDURE — 77010033678 HC OXYGEN DAILY

## 2017-07-27 PROCEDURE — 74011250636 HC RX REV CODE- 250/636: Performed by: INTERNAL MEDICINE

## 2017-07-27 PROCEDURE — 74011636637 HC RX REV CODE- 636/637: Performed by: HOSPITALIST

## 2017-07-27 RX ORDER — CEFAZOLIN SODIUM 2 G/50ML
2 SOLUTION INTRAVENOUS EVERY 8 HOURS
Status: DISCONTINUED | OUTPATIENT
Start: 2017-07-27 | End: 2017-07-28 | Stop reason: HOSPADM

## 2017-07-27 RX ADMIN — Medication 20 ML: at 14:00

## 2017-07-27 RX ADMIN — Medication 10 ML: at 05:32

## 2017-07-27 RX ADMIN — SENNOSIDES 8.6 MG: 8.6 TABLET, FILM COATED ORAL at 11:19

## 2017-07-27 RX ADMIN — BRIMONIDINE TARTRATE 1 DROP: 2 SOLUTION OPHTHALMIC at 11:21

## 2017-07-27 RX ADMIN — Medication 10 ML: at 11:42

## 2017-07-27 RX ADMIN — CEFAZOLIN SODIUM 2 G: 2 SOLUTION INTRAVENOUS at 11:20

## 2017-07-27 RX ADMIN — BRIMONIDINE TARTRATE 1 DROP: 2 SOLUTION OPHTHALMIC at 18:00

## 2017-07-27 RX ADMIN — INSULIN LISPRO 3 UNITS: 100 INJECTION, SOLUTION INTRAVENOUS; SUBCUTANEOUS at 18:56

## 2017-07-27 RX ADMIN — Medication 10 ML: at 14:00

## 2017-07-27 RX ADMIN — DORZOLAMIDE HYDROCHLORIDE AND TIMOLOL MALEATE 1 DROP: 20; 5 SOLUTION/ DROPS OPHTHALMIC at 18:00

## 2017-07-27 RX ADMIN — POLYETHYLENE GLYCOL 3350 17 G: 17 POWDER, FOR SOLUTION ORAL at 11:20

## 2017-07-27 RX ADMIN — PANTOPRAZOLE SODIUM 40 MG: 40 TABLET, DELAYED RELEASE ORAL at 11:19

## 2017-07-27 RX ADMIN — CEFAZOLIN SODIUM 2 G: 2 SOLUTION INTRAVENOUS at 20:54

## 2017-07-27 RX ADMIN — DORZOLAMIDE HYDROCHLORIDE AND TIMOLOL MALEATE 1 DROP: 20; 5 SOLUTION/ DROPS OPHTHALMIC at 11:21

## 2017-07-27 RX ADMIN — INSULIN LISPRO 3 UNITS: 100 INJECTION, SOLUTION INTRAVENOUS; SUBCUTANEOUS at 12:00

## 2017-07-27 NOTE — ROUTINE PROCESS
0730  Bedside and Verbal shift change report given to Arnaldo Haile (oncoming nurse) by Suki Elkins (offgoing nurse). Report included the following information SBAR, Kardex, Intake/Output and MAR.     0900  Shift assessment complete and due meds given. No complaints at this time    1500  Reassessment complete. No change in pt status    1900  Bedside and Verbal shift change report given to Suki Elkins (oncoming nurse) by Arnaldo Haile (offgoing nurse). Report included the following information SBAR, Kardex, Intake/Output and MAR.

## 2017-07-27 NOTE — PROGRESS NOTES
Chart reviewed. Plan remains return to . [facility] when medically stable. Will cont to follow for further needs. Jada Hare RN,ext. [phone].

## 2017-07-27 NOTE — PROGRESS NOTES
1921--Received Bedside report from [facility]. Pt in bed and alert and nonverbal. Instructed pt to call for assistance prior to getting out of bed. Call bell within reach with bed in low position. Wheel of bed locked    Shift assessment completed    2333--glucose 110. Insulin held. Eye drop administered in both eyes. Pt incontinent to urine. Condom cath applied    0125--Pt sleeping without distress. Call bell within reach    0330--No change from previous assessment. Call bell within. 0532--Blood drawn from PICC line. Adjusted and weighed pt in bed. Call bell within reach      0730--Bedside and Verbal shift change report given to Shameka ALEXANDER (oncoming nurse) by Abigail Huggins RN (offgoing nurse). Report included the following information SBAR, Kardex, Intake/Output, MAR and Recent Results.

## 2017-07-27 NOTE — PROGRESS NOTES
[address] [place], [place] [address]  Hospitalist Division        Inpatient Daily Progress Note    Daily progress Note    Patient: Cruz Weems MRN: [id number]  CSN: [id number]    YOB: 1945  Age: 67 y.o. Sex: male    DOA: 7/15/2017 LOS:  LOS: 12 days                    Chief Complaint:  GI Bleed      Subjective:      No complaints     Objective:      Visit Vitals    /73 (BP 1 Location: Left arm)    Pulse 100    Temp 97.6 °F (36.4 °C)    Resp 20    Ht 6' (1.829 m)    Wt 91.3 kg (201 lb 3.2 oz)    SpO2 96%    BMI 27.29 kg/m2       Physical Exam  General appearance: alert, cooperative, no distress, appears stated age  Head: Normocephalic, without obvious abnormality, atraumatic  Lungs: clear to auscultation bilaterally, no wheezes   Heart: regular rate and rhythm, S1, S2 normal, no murmur, click, rub or gallop  Abdomen: soft, non tender, non distended . Normoactive bowel sounds  Extremities: extremities normal, atraumatic, no cyanosis.  Trace BLE edema   Skin: Skin color, texture, turgor normal. No rashes or lesions          Intake and Output:  Current Shift:  07/27 0701 - 07/27 1900  In: 100 [P.O.:100]  Out: -   Last three shifts:  07/25 1901 - 07/27 0700  In: 720 [P.O.:720]  Out: 725 [Urine:725]    Recent Results (from the past 24 hour(s))   GLUCOSE, POC    Collection Time: 07/26/17 11:32 PM   Result Value Ref Range    Glucose (POC) 110 70 - 110 mg/dL   GLUCOSE, POC    Collection Time: 07/27/17  5:32 AM   Result Value Ref Range    Glucose (POC) 122 (H) 70 - 110 mg/dL   CBC WITH AUTOMATED DIFF    Collection Time: 07/27/17  5:45 AM   Result Value Ref Range    WBC 8.9 4.6 - 13.2 K/uL    RBC 4.31 (L) 4.70 - 5.50 M/uL    HGB 10.8 (L) 13.0 - 16.0 g/dL    HCT 35.5 (L) 36.0 - 48.0 %    MCV 82.4 74.0 - 97.0 FL    MCH 25.1 24.0 - 34.0 PG    MCHC 30.4 (L) 31.0 - 37.0 g/dL    RDW 17.2 (H) 11.6 - 14.5 %    PLATELET 730 146 - 405 K/uL    MPV 9.8 9.2 - 11.8 FL    NEUTROPHILS 80 (H) 40 - 73 %    LYMPHOCYTES 9 (L) 21 - 52 %    MONOCYTES 10 3 - 10 %    EOSINOPHILS 1 0 - 5 %    BASOPHILS 0 0 - 2 %    ABS. NEUTROPHILS 7.2 1.8 - 8.0 K/UL    ABS. LYMPHOCYTES 0.8 (L) 0.9 - 3.6 K/UL    ABS. MONOCYTES 0.9 0.05 - 1.2 K/UL    ABS. EOSINOPHILS 0.1 0.0 - 0.4 K/UL    ABS. BASOPHILS 0.0 0.0 - 0.06 K/UL    DF AUTOMATED     METABOLIC PANEL, COMPREHENSIVE    Collection Time: 07/27/17  5:45 AM   Result Value Ref Range    Sodium 143 136 - 145 mmol/L    Potassium 3.8 3.5 - 5.5 mmol/L    Chloride 107 100 - 108 mmol/L    CO2 28 21 - 32 mmol/L    Anion gap 8 3.0 - 18 mmol/L    Glucose 115 (H) 74 - 99 mg/dL    BUN 26 (H) 7.0 - 18 MG/DL    Creatinine 1.30 0.6 - 1.3 MG/DL    BUN/Creatinine ratio 20 12 - 20      GFR est AA >60 >60 ml/min/1.73m2    GFR est non-AA 54 (L) >60 ml/min/1.73m2    Calcium 8.9 8.5 - 10.1 MG/DL    Bilirubin, total 0.8 0.2 - 1.0 MG/DL    ALT (SGPT) <6 (L) 16 - 61 U/L    AST (SGOT) 32 15 - 37 U/L    Alk. phosphatase 166 (H) 45 - 117 U/L    Protein, total 7.4 6.4 - 8.2 g/dL    Albumin 1.5 (L) 3.4 - 5.0 g/dL    Globulin 5.9 (H) 2.0 - 4.0 g/dL    A-G Ratio 0.3 (L) 0.8 - 1.7     GLUCOSE, POC    Collection Time: 07/27/17 12:01 PM   Result Value Ref Range    Glucose (POC) 165 (H) 70 - 110 mg/dL           Lab Results   Component Value Date/Time    Glucose 115 07/27/2017 05:45 AM    Glucose 103 07/26/2017 05:20 AM    Glucose 112 07/25/2017 04:40 AM    Glucose 109 07/24/2017 05:25 AM    Glucose 127 07/23/2017 05:37 AM        Assessment/Plan:     Patient Active Problem List   Diagnosis Code    Acute upper GI bleed K92.2    NEPTALI (acute kidney injury) ([facility] [address] N17.9    Febrile illness, acute R50.9    Lower GI bleed K92.2    Altered mental status R41.82    CKD (chronic kidney disease) stage 3, GFR 30-59 ml/min N18.3    Urinary tract infection without hematuria D23.1    Systolic CHF, chronic (HCC) I50.22       A/P:  Lower GI bleed: resolved  CKD: appreciate Nephrology assistance  ID following- continue Cefazolin. Total 6 week course   DM: SSI.  Monitor glucose control   Chronic systolic CHF   GERD: Protonix  DVT prophylaxis: SCDS      ALVARADO Alcaraz 83  Pager:  [phone]  Office:  [phone]

## 2017-07-27 NOTE — PROGRESS NOTES
Medications administered on behalf of primary nurse Dana Espinoza.  PO medications tolerated well whole mixed in oatmeal.

## 2017-07-27 NOTE — PROGRESS NOTES
Infectious Disease Follow-up     Admit Date: 7/15/2017    Current abx Prior abx   Cefazolin 7/20-7 levaquin 7/18-2m ceftriaxone 7/15-3 vanco 7/17-2     Assessment ->Rec:     High grade MSSA BSI  - POA 2 of 2 bctx's 7/15, also 7/17 and 7/18 2 of 2 sets;   - 7/20 blcx still + x 2  -echo (TTE) neg x 2 for vegetation but with 5 of 6 positive concern for IE  - rpt blcx 7/22 NGTD x 2  - c/o R shoulder and R knee pain- may have chronic effusion R knee, no obvious STS R shoulder with post point tenderness, no reported back pain, no reproducible tenderness   -cxr ctap neg, feet ok -> monitor repeat bctx's from 7/22- so far ngtd  -> Continue Cefazolin x 6 weeks  -> doubt good surgical candidate with low EF multiple other comorbidities     R knee effusion  - c/o pain though min warmth c/w L  - s/p US guided arthrocentesis 7/20: 23 cc pink thin nonodorous fluid. gs few wbc, NOS. cx NGTD -> monitor   R shoulder pain- plain film s/o rotator cuff tear -> monitor    E coli UTI -ua adm sm francis est 4-6 wbc grew >100k E coli R amp -- initially treated ceftriaxone x 3 d, on levaquin, repeat uctx ng -> monitor    Sepsis with T 102.5 7/16, wbc 14.4 7/16- suspect d/t SA BSI ->monitor on abx    chf with  EF 0.15-0.20 this adm    guaic pos stool on admission on coumadin, improved colonic distention  ->per GI   HTN    NEPTALI on CKD3  Cr 1.3 now,  peak 4.7 7/18, was 2.6 2/17, 2.4 on adm -> improving    DM2 A1c 6.5    Deaf mute     H/o gout     Increased globulin TP 8.3 alb 1.9 ? Chronic inflammation vs monoclonal  ->await spep     MICROBIOLOGY:   7/15  bctx x 2 mssa   uctx >100k E coli R amp  7/16 uctx ng  7/17 bctx x 2 mssa  7/18 bctx x 2 mssa  7/20 bctx x 2 MSSA  7/21     Body fluid [ knee arthrocentesis ] cx - ntd   7/22 blcx NGTD x 2    LINES AND CATHETERS:   PICC RUE 7/26    Subjective: Interval notes reviewed, sleeping but easily wakes up on tactile stimulus, nodding appropriately to questions, remains afebrile. Current Facility-Administered Medications   Medication Dose Route Frequency    sodium chloride (NS) flush 10-30 mL  10-30 mL InterCATHeter PRN    sodium chloride (NS) flush 10 mL  10 mL InterCATHeter Q24H    sodium chloride (NS) flush 10 mL  10 mL InterCATHeter PRN    sodium chloride (NS) flush 10-40 mL  10-40 mL InterCATHeter Q8H    bacitracin 500 unit/gram packet 1 Packet  1 Packet Topical PRN    ceFAZolin (ANCEF) 2g IVPB in 50 mL D5W  2 g IntraVENous Q8H    pantoprazole (PROTONIX) tablet 40 mg  40 mg Oral DAILY    polyethylene glycol (MIRALAX) packet 17 g  17 g Oral DAILY    acetaminophen (TYLENOL) tablet 650 mg  650 mg Oral Q4H PRN    glucose chewable tablet 16 g  4 Tab Oral PRN    glucagon (GLUCAGEN) injection 1 mg  1 mg IntraMUSCular PRN    dextrose (D50W) injection syrg 12.5-25 g  25-50 mL IntraVENous PRN    insulin lispro (HUMALOG) injection   SubCUTAneous Q6H    sodium chloride (NS) flush 5-10 mL  5-10 mL IntraVENous PRN    0.9% sodium chloride infusion 250 mL  250 mL IntraVENous PRN    latanoprost (XALATAN) 0.005 % ophthalmic solution 1 Drop  1 Drop Both Eyes QHS    nitroglycerin (NITRODUR) 0.4 mg/hr patch 1 Patch  1 Patch TransDERmal DAILY    brimonidine (ALPHAGAN) 0.2 % ophthalmic solution 1 Drop  1 Drop Both Eyes BID    dorzolamide-timolol (COSOPT) 22.3-6.8 mg/mL ophthalmic solution 1 Drop  1 Drop Both Eyes BID    senna (SENOKOT) tablet 8.6 mg  1 Tab Oral BID    sodium chloride (NS) flush 5-10 mL  5-10 mL IntraVENous Q8H    sodium chloride (NS) flush 5-10 mL  5-10 mL IntraVENous PRN    ondansetron (ZOFRAN) injection 4 mg  4 mg IntraVENous Q6H PRN    0.9% sodium chloride infusion  75 mL/hr IntraVENous PRN         Objective:     Visit Vitals    /67    Pulse 99    Temp 98.4 °F (36.9 °C)    Resp 22    Ht 6' (1.829 m)    Wt 91.3 kg (201 lb 3.2 oz)    SpO2 95%    BMI 27.29 kg/m2       Temp (24hrs), Av.4 °F (36.9 °C), Min:97.5 °F (36.4 °C), Max:99.1 °F (37.3 °C)    GEN: drowsy elderly BM lying in bed NAD, more alert, non-verbal   HEENT: carious teeth  NECK: supple no LAD trach midline  CHEST: no rales rhonchi or wheeze  CVS: regular,II/VI murmur LLSB/base, RRR   ABD: soft non-tender (+) BS no organomegaly maher yellow urine  EXT: R knee effusion no warmth  SKIN: no rash     Labs: Results:   Chemistry Recent Labs      07/27/17   0545  07/26/17   0520  07/25/17   0440   GLU  115*  103*  112*   NA  143  141  143   K  3.8  3.4*  3.4*   CL  107  108  108   CO2  28  25  25   BUN  26*  28*  33*   CREA  1.30  1.28  1.35*   CA  8.9  8.9  9.5   AGAP  8  8  10   BUCR  20  22*  24*   AP  166*   --    --    TP  7.4   --    --    ALB  1.5*  1.5*  1.5*   GLOB  5.9*   --    --    AGRAT  0.3*   --    --       CBC w/Diff Recent Labs      07/27/17   0545   WBC  8.9   RBC  4.31*   HGB  10.8*   HCT  35.5*   PLT  261   GRANS  80*   LYMPH  9*   EOS  1        R knee IMPRESSION:  1. No acute osseous finding. 2. Large joint effusion. R shoulder IMPRESSION:  1. No acute osseous finding.   2. Degenerative changes as above with pronounced inferior acromial spurring,  usually associated with rotator cuff tear      Hiram Lamb MD  July 27, 2017  [facility] AT THE [facility]  [phone]

## 2017-07-27 NOTE — PROGRESS NOTES
RENAL PROGRESS NOTE        Glenys Haskins         Assessment/Plan:   · NEPTALI ( ischemic atn in a setting of uti/sepsis/gi bleeding). Resolved, creat at baseline ~1.1-1.3  · CKD 3 due to presumably dm/htn. · Hypokalemia. Replace. Hyperkalemia has resolved. · Hypernatremia-resolved  · MSSA high grade bacteriemia. Source? Abx per ID.  · E. Coli uti. · HTN. Stable off medications. · Systolic chf. No overt decompensation. Patient scheduled to transfer to [facility] later today. Please call with questions                         Subjective:  deaf and mute per chart        Patient Active Problem List   Diagnosis Code    Acute upper GI bleed K92.2    NEPTALI (acute kidney injury) ([facility] [address] N17.9    Febrile illness, acute R50.9    Lower GI bleed K92.2    Altered mental status R41.82    CKD (chronic kidney disease) stage 3, GFR 30-59 ml/min N18.3    Urinary tract infection without hematuria A04.8    Systolic CHF, chronic (HCC) I50.22       Current Facility-Administered Medications   Medication Dose Route Frequency Provider Last Rate Last Dose    sodium chloride (NS) flush 10-30 mL  10-30 mL InterCATHeter PRN Conchis Brown MD        sodium chloride (NS) flush 10 mL  10 mL InterCATHeter Q24H Jennifer Perdue MD   10 mL at 07/27/17 1142    sodium chloride (NS) flush 10 mL  10 mL InterCATHeter PRN Jennifer Perdue MD        sodium chloride (NS) flush 10-40 mL  10-40 mL InterCATHeter Madelaine Hinojosa MD   10 mL at 07/27/17 0532    bacitracin 500 unit/gram packet 1 Packet  1 Packet Topical PRN Conchis Brown MD        ceFAZolin (ANCEF) 2g IVPB in 50 mL D5W  2 g IntraVENous Q8H CLINTON Blackmon  mL/hr at 07/27/17 1120 2 g at 07/27/17 1120    pantoprazole (PROTONIX) tablet 40 mg  40 mg Oral DAILY Dylon Castillo MD   40 mg at 07/27/17 1119    polyethylene glycol (MIRALAX) packet 17 g  17 g Oral DAILY Aileen Chau MD   17 g at 07/27/17 1120    acetaminophen (TYLENOL) tablet 650 mg 650 mg Oral Q4H PRN Cynda Areas, DO        glucose chewable tablet 16 g  4 Tab Oral PRN Cynda Areas, DO        glucagon [facility]) injection 1 mg  1 mg IntraMUSCular PRN Cynda Areas, DO        dextrose (D50W) injection syrg 12.5-25 g  25-50 mL IntraVENous PRN Cynda Areas, DO        insulin lispro (HUMALOG) injection   SubCUTAneous Q6H Cynda Areas, DO   3 Units at 07/27/17 1200    sodium chloride (NS) flush 5-10 mL  5-10 mL IntraVENous PRN Sonam Gtz MD        0.9% sodium chloride infusion 250 mL  250 mL IntraVENous PRN Sonam Gtz MD        latanoprost (XALATAN) 0.005 % ophthalmic solution 1 Drop  1 Drop Both Eyes QHS Maddy Carrington MD   1 Drop at 07/26/17 2332    nitroglycerin (NITRODUR) 0.4 mg/hr patch 1 Patch  1 Patch TransDERmal DAILY Maddy Carrington MD   1 Patch at 07/27/17 1120    brimonidine (ALPHAGAN) 0.2 % ophthalmic solution 1 Drop  1 Drop Both Eyes BID Maddy Carrington MD   1 Drop at 07/27/17 1121    dorzolamide-timolol (COSOPT) 22.3-6.8 mg/mL ophthalmic solution 1 Drop  1 Drop Both Eyes BID Maddy Carrington MD   1 Drop at 07/27/17 1121    senna (SENOKOT) tablet 8.6 mg  1 Tab Oral BID Maddy Carrington MD   8.6 mg at 07/27/17 1119    sodium chloride (NS) flush 5-10 mL  5-10 mL IntraVENous Q8H Maddy Carrington MD   10 mL at 07/27/17 0532    sodium chloride (NS) flush 5-10 mL  5-10 mL IntraVENous PRN Maddy Carrington MD        ondansetron [facility]) injection 4 mg  4 mg IntraVENous Q6H PRN Maddy Carrington MD        0.9% sodium chloride infusion  75 mL/hr IntraVENous PRN Maddy Carrington MD           Objective  Vitals:    07/27/17 0542 07/27/17 0543 07/27/17 0950 07/27/17 1348   BP:  139/72 126/67 142/65   Pulse:  (!) 104 99 100   Resp:  22 22 20   Temp:  99.1 °F (37.3 °C) 98.4 °F (36.9 °C) 97.3 °F (36.3 °C)   SpO2:  96% 95% 93%   Weight: 91.3 kg (201 lb 3.2 oz)      Height:             Intake/Output Summary (Last 24 hours) at 07/27/17 [address] [place] [address] filed at 07/27/17 0951   Gross per 24 hour   Intake              220 ml   Output              525 ml   Net             -305 ml           Admission weight: Weight: 89.4 kg (197 lb) (07/15/17 1530)  Last Weight Metrics:  Weight Loss Metrics 7/27/2017 2/17/2017 3/15/2016   Today's Wt 201 lb 3.2 oz 205 lb 1.6 oz 223 lb   BMI 27.29 kg/m2 29.43 kg/m2 30.24 kg/m2             Physical Assessment:     General: awake  Neck: No jvd. LUNGS: Clear to Auscultation, No rales, rhonchi or wheezes. CVS EXM: S1, S2  RRR, no murmurs/gallops/rubs. Abdomen: soft, non tender. Lower Extremities:  no edema.        Lab    CBC w/Diff Recent Labs      07/27/17   0545   WBC  8.9   RBC  4.31*   HGB  10.8*   HCT  35.5*   PLT  261   GRANS  80*   LYMPH  9*   EOS  1        Chemistry Recent Labs      07/27/17   0545  07/26/17   0520  07/25/17   0440   GLU  115*  103*  112*   NA  143  141  143   K  3.8  3.4*  3.4*   CL  107  108  108   CO2  28  25  25   BUN  26*  28*  33*   CREA  1.30  1.28  1.35*   CA  8.9  8.9  9.5   AGAP  8  8  10   BUCR  20  22*  24*   AP  166*   --    --    TP  7.4   --    --    ALB  1.5*  1.5*  1.5*   GLOB  5.9*   --    --    AGRAT  0.3*   --    --    PHOS   --   2.7  2.6         Lab Results   Component Value Date/Time    Iron 15 07/17/2017 03:50 AM    TIBC 181 07/17/2017 03:50 AM    Iron % saturation 8 07/17/2017 03:50 AM    Ferritin 12 03/15/2016 09:50 AM      Lab Results   Component Value Date/Time    Calcium 8.9 07/27/2017 05:45 AM    Phosphorus 2.7 07/26/2017 05:20 AM

## 2017-07-28 VITALS
WEIGHT: 201 LBS | TEMPERATURE: 98.5 F | HEART RATE: 100 BPM | OXYGEN SATURATION: 98 % | DIASTOLIC BLOOD PRESSURE: 74 MMHG | SYSTOLIC BLOOD PRESSURE: 143 MMHG | HEIGHT: 72 IN | RESPIRATION RATE: 20 BRPM | BODY MASS INDEX: 27.22 KG/M2

## 2017-07-28 LAB
ALBUMIN SERPL BCP-MCNC: 1.5 G/DL (ref 3.4–5)
ANION GAP BLD CALC-SCNC: 9 MMOL/L (ref 3–18)
BACTERIA SPEC CULT: NORMAL
BACTERIA SPEC CULT: NORMAL
BUN SERPL-MCNC: 27 MG/DL (ref 7–18)
BUN/CREAT SERPL: 23 (ref 12–20)
CALCIUM SERPL-MCNC: 9.4 MG/DL (ref 8.5–10.1)
CHLORIDE SERPL-SCNC: 109 MMOL/L (ref 100–108)
CO2 SERPL-SCNC: 26 MMOL/L (ref 21–32)
CREAT SERPL-MCNC: 1.18 MG/DL (ref 0.6–1.3)
ERYTHROCYTE [DISTWIDTH] IN BLOOD BY AUTOMATED COUNT: 17.3 % (ref 11.6–14.5)
GLUCOSE BLD STRIP.AUTO-MCNC: 105 MG/DL (ref 70–110)
GLUCOSE BLD STRIP.AUTO-MCNC: 105 MG/DL (ref 70–110)
GLUCOSE BLD STRIP.AUTO-MCNC: 130 MG/DL (ref 70–110)
GLUCOSE BLD STRIP.AUTO-MCNC: 190 MG/DL (ref 70–110)
GLUCOSE SERPL-MCNC: 114 MG/DL (ref 74–99)
HCT VFR BLD AUTO: 35.5 % (ref 36–48)
HGB BLD-MCNC: 11 G/DL (ref 13–16)
MCH RBC QN AUTO: 25.6 PG (ref 24–34)
MCHC RBC AUTO-ENTMCNC: 31 G/DL (ref 31–37)
MCV RBC AUTO: 82.6 FL (ref 74–97)
PHOSPHATE SERPL-MCNC: 2.8 MG/DL (ref 2.5–4.9)
PLATELET # BLD AUTO: 298 K/UL (ref 135–420)
PMV BLD AUTO: 9.8 FL (ref 9.2–11.8)
POTASSIUM SERPL-SCNC: 3.5 MMOL/L (ref 3.5–5.5)
RBC # BLD AUTO: 4.3 M/UL (ref 4.7–5.5)
SERVICE CMNT-IMP: NORMAL
SERVICE CMNT-IMP: NORMAL
SODIUM SERPL-SCNC: 144 MMOL/L (ref 136–145)
WBC # BLD AUTO: 10 K/UL (ref 4.6–13.2)

## 2017-07-28 PROCEDURE — 74011250637 HC RX REV CODE- 250/637: Performed by: HOSPITALIST

## 2017-07-28 PROCEDURE — 74011636637 HC RX REV CODE- 636/637: Performed by: HOSPITALIST

## 2017-07-28 PROCEDURE — 74011250636 HC RX REV CODE- 250/636: Performed by: HOSPITALIST

## 2017-07-28 PROCEDURE — 36415 COLL VENOUS BLD VENIPUNCTURE: CPT | Performed by: INTERNAL MEDICINE

## 2017-07-28 PROCEDURE — 36592 COLLECT BLOOD FROM PICC: CPT

## 2017-07-28 PROCEDURE — 74011250637 HC RX REV CODE- 250/637: Performed by: INTERNAL MEDICINE

## 2017-07-28 PROCEDURE — 82962 GLUCOSE BLOOD TEST: CPT

## 2017-07-28 PROCEDURE — 77010033678 HC OXYGEN DAILY

## 2017-07-28 PROCEDURE — 85027 COMPLETE CBC AUTOMATED: CPT | Performed by: HOSPITALIST

## 2017-07-28 PROCEDURE — 80069 RENAL FUNCTION PANEL: CPT | Performed by: INTERNAL MEDICINE

## 2017-07-28 RX ORDER — INSULIN LISPRO 100 [IU]/ML
INJECTION, SOLUTION INTRAVENOUS; SUBCUTANEOUS
Qty: 1 VIAL | Refills: 0 | Status: SHIPPED
Start: 2017-07-28

## 2017-07-28 RX ORDER — POLYETHYLENE GLYCOL 3350 17 G/17G
17 POWDER, FOR SOLUTION ORAL DAILY
Qty: 1 EACH | Refills: 0 | Status: SHIPPED
Start: 2017-07-28

## 2017-07-28 RX ORDER — PANTOPRAZOLE SODIUM 40 MG/1
40 TABLET, DELAYED RELEASE ORAL DAILY
Qty: 30 TAB | Refills: 0 | Status: SHIPPED
Start: 2017-07-28

## 2017-07-28 RX ADMIN — CEFAZOLIN SODIUM 2 G: 2 SOLUTION INTRAVENOUS at 12:03

## 2017-07-28 RX ADMIN — BRIMONIDINE TARTRATE 1 DROP: 2 SOLUTION OPHTHALMIC at 10:26

## 2017-07-28 RX ADMIN — LATANOPROST 1 DROP: 50 SOLUTION OPHTHALMIC at 00:03

## 2017-07-28 RX ADMIN — Medication 10 ML: at 00:04

## 2017-07-28 RX ADMIN — Medication 10 ML: at 15:42

## 2017-07-28 RX ADMIN — INSULIN LISPRO 3 UNITS: 100 INJECTION, SOLUTION INTRAVENOUS; SUBCUTANEOUS at 12:00

## 2017-07-28 RX ADMIN — Medication 10 ML: at 12:09

## 2017-07-28 RX ADMIN — Medication 10 ML: at 05:54

## 2017-07-28 RX ADMIN — PANTOPRAZOLE SODIUM 40 MG: 40 TABLET, DELAYED RELEASE ORAL at 10:02

## 2017-07-28 RX ADMIN — DORZOLAMIDE HYDROCHLORIDE AND TIMOLOL MALEATE 1 DROP: 20; 5 SOLUTION/ DROPS OPHTHALMIC at 10:26

## 2017-07-28 RX ADMIN — POLYETHYLENE GLYCOL 3350 17 G: 17 POWDER, FOR SOLUTION ORAL at 10:02

## 2017-07-28 RX ADMIN — CEFAZOLIN SODIUM 2 G: 2 SOLUTION INTRAVENOUS at 03:46

## 2017-07-28 RX ADMIN — SENNOSIDES 8.6 MG: 8.6 TABLET, FILM COATED ORAL at 10:02

## 2017-07-28 NOTE — PROGRESS NOTES
Infectious Disease Follow-up     Admit Date: 7/15/2017    Current abx Prior abx   Cefazolin 7/20-8  MSSA rx 13 levaquin 7/18-2m ceftriaxone 7/15-3 vanco 7/17-2     Assessment ->Rec:     High grade MSSA BSI  - POA 2 of 2 bctx's 7/15, also 7/17 and 7/18 2 of 2 sets;   - 7/20 blcx still + x 2  -echo (TTE) neg x 2 for vegetation but with 5 of 6 positive concern for IE  - rpt blcx 7/22 NGTD x 2  - c/o R shoulder and R knee pain- may have chronic effusion R knee, no obvious STS R shoulder with post point tenderness, no reported back pain, no reproducible tenderness   -> doubt good surgical candidate with low EF multiple other comorbidities  -cxr ctap neg, feet ok -> monitor repeat bctx's from 7/22- so far ngtd  -> Continue Cefazolin to complete 6 weeks MSSA rx. End date: 8/26/2017  -> OPAT orders entered  -> CBC, BMP, ESR, CRP q Monday  Follow up w/ [facility] after 1 week. Call [phone] for appt w/ Aimee Martini, NP   R knee effusion  - c/o pain though min warmth c/w L  - s/p US guided arthrocentesis 7/20: 23 cc pink thin nonodorous fluid. gs few wbc, NOS. cx NGTD -> monitor   R shoulder pain- plain film s/o rotator cuff tear -> monitor    E coli UTI -ua adm sm francis est 4-6 wbc grew >100k E coli R amp -- initially treated ceftriaxone x 3 d, on levaquin, repeat uctx ng -> monitor    Sepsis with T 102.5 7/16, wbc 14.4 7/16- suspect d/t SA BSI ->monitor on abx    chf with  EF 0.15-0.20 this adm    guaic pos stool on admission on coumadin, improved colonic distention  ->per GI   HTN    NEPTALI on CKD3  Cr 1.3 now,  peak 4.7 7/18, was 2.6 2/17, 2.4 on adm -> improving    DM2 A1c 6.5    Deaf mute     H/o gout     Increased globulin TP 8.3 alb 1.9 ?  Chronic inflammation vs monoclonal  ->await spep     MICROBIOLOGY:   7/15  bctx x 2 mssa   uctx >100k E coli R amp  7/16 uctx ng  7/17 bctx x 2 mssa  7/18 bctx x 2 mssa  7/20 bctx x 2 MSSA  7/21     Body fluid [ knee arthrocentesis ] cx - ntd   7/22 blcx NGTD x 2    LINES AND CATHETERS:   PICC RUE 7/26    Subjective: Interval notes reviewed, Awake and nods to questions. Afebrile.  Being discharged    Current Facility-Administered Medications   Medication Dose Route Frequency    ceFAZolin (ANCEF) 2g IVPB in 50 mL D5W  2 g IntraVENous Q8H    sodium chloride (NS) flush 10-30 mL  10-30 mL InterCATHeter PRN    sodium chloride (NS) flush 10 mL  10 mL InterCATHeter Q24H    sodium chloride (NS) flush 10 mL  10 mL InterCATHeter PRN    sodium chloride (NS) flush 10-40 mL  10-40 mL InterCATHeter Q8H    bacitracin 500 unit/gram packet 1 Packet  1 Packet Topical PRN    pantoprazole (PROTONIX) tablet 40 mg  40 mg Oral DAILY    polyethylene glycol (MIRALAX) packet 17 g  17 g Oral DAILY    acetaminophen (TYLENOL) tablet 650 mg  650 mg Oral Q4H PRN    glucose chewable tablet 16 g  4 Tab Oral PRN    glucagon (GLUCAGEN) injection 1 mg  1 mg IntraMUSCular PRN    dextrose (D50W) injection syrg 12.5-25 g  25-50 mL IntraVENous PRN    insulin lispro (HUMALOG) injection   SubCUTAneous Q6H    sodium chloride (NS) flush 5-10 mL  5-10 mL IntraVENous PRN    0.9% sodium chloride infusion 250 mL  250 mL IntraVENous PRN    latanoprost (XALATAN) 0.005 % ophthalmic solution 1 Drop  1 Drop Both Eyes QHS    nitroglycerin (NITRODUR) 0.4 mg/hr patch 1 Patch  1 Patch TransDERmal DAILY    brimonidine (ALPHAGAN) 0.2 % ophthalmic solution 1 Drop  1 Drop Both Eyes BID    dorzolamide-timolol (COSOPT) 22.3-6.8 mg/mL ophthalmic solution 1 Drop  1 Drop Both Eyes BID    senna (SENOKOT) tablet 8.6 mg  1 Tab Oral BID    sodium chloride (NS) flush 5-10 mL  5-10 mL IntraVENous Q8H    sodium chloride (NS) flush 5-10 mL  5-10 mL IntraVENous PRN    ondansetron (ZOFRAN) injection 4 mg  4 mg IntraVENous Q6H PRN    0.9% sodium chloride infusion  75 mL/hr IntraVENous PRN         Objective:     Visit Vitals    /68 (BP 1 Location: Left arm)    Pulse 71    Temp 98.4 °F (36.9 °C)    Resp 20    Ht 6' (1.829 m)  Wt 91.2 kg (201 lb)    SpO2 92%    BMI 27.26 kg/m2       Temp (24hrs), Av.5 °F (36.4 °C), Min:97 °F (36.1 °C), Max:98.4 °F (36.9 °C)    GEN: drowsy elderly BM lying in bed NAD, more alert, non-verbal   HEENT: carious teeth  NECK: supple no LAD trach midline  CHEST: no rales rhonchi or wheeze  CVS: regular,II/VI murmur LLSB/base, RRR   ABD: soft non-tender (+) BS no organomegaly maher yellow urine  EXT: R knee effusion no warmth  SKIN: no rash     Labs: Results:   Chemistry Recent Labs      17   0540  17   0545  17   0520   GLU  114*  115*  103*   NA  144  143  141   K  3.5  3.8  3.4*   CL  109*  107  108   CO2  26  28  25   BUN  27*  26*  28*   CREA  1.18  1.30  1.28   CA  9.4  8.9  8.9   AGAP  9  8  8   BUCR  23*  20  22*   AP   --   166*   --    TP   --   7.4   --    ALB  1.5*  1.5*  1.5*   GLOB   --   5.9*   --    AGRAT   --   0.3*   --       CBC w/Diff Recent Labs      17   1155  17   0545   WBC  10.0  8.9   RBC  4.30*  4.31*   HGB  11.0*  10.8*   HCT  35.5*  35.5*   PLT  298  261   GRANS   --   80*   LYMPH   --   9*   EOS   --   1        R knee IMPRESSION:  1. No acute osseous finding. 2. Large joint effusion. R shoulder IMPRESSION:  1. No acute osseous finding.   2. Degenerative changes as above with pronounced inferior acromial spurring,  usually associated with rotator cuff tear      Brady Matta MD  2017  [facility] AT THE [facility]  [phone]

## 2017-07-28 NOTE — PROGRESS NOTES
1925--received Bedside report from [facility]. Pt in bed resting. Pt is nonverbal and on room air, instructed pt to call for assistance prior to getting out of bed. 2057--Shift assessment completed. Bell in place and draining. Antibiotic hung and infusing    0000--blood glucose 92. Insulin held, Eye drop administered in both eyes. Pt resting quietly in bed, call bell within reach    0017--Assisted feeding with applesauce. Pt tolerated well. Call bell within reach    0348--IV med hung and infusing. Pt sleeping without distress. Call bell within reach    0543--Blood drawn from PICC line fro lab. Glucose 105. Insulin held. 0731--was notified by tele monitor of pts 12 runs of vtach. Will notify am nurse      Bedside and Verbal shift change report given to Gerardo Alberto (oncoming nurse) by Tommy Mishra RN (offgoing nurse). Report included the following information SBAR, Kardex, Intake/Output, MAR and Recent Results.

## 2017-07-28 NOTE — PROGRESS NOTES
NUTRITION follow-up/Plan of care    RECOMMENDATIONS:     1. Dental Soft diet  2. Ensure Enlive TID  3. Monitor weight, labs and PO intake  4. RD to follow    GOALS:     1. Ongoing: PO intake meets >75% of protein/calorie needs by 8/2  2. Met/Ongoing: Maintain weight (+/- 1-2 lb by 8/2)    ASSESSMENT:      Weight status is classified as overweight per BMI of 27.3. However, weight appropriate for age. PO intake is fair. Will increase supplements to Ensure Enlive TID for additional calories/protein. Labs noted. BG range from  over past 24 hours. Patient with hypoalbuminemia. Nutrition recommendations listed. Will monitor diet advancement. 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 Per GI, patient with colonic dilation, most suggestive of colonic pseudo-obstruction but improved. NG tube pulled by patient and not to be reinserted per MD. Patient with stage 2 pressure ulcer to sacrum per nursing documentation. Tolerating dental soft diet. Observed 50% intake of breakfast meal and 100% intake of Ensure supplement during visit. Encouraged adequate intake to meet nutrition needs. Will monitor. Information Obtained From:   [x] Chart Review  [x] Patient  [] Family/Caregiver  [] Nurse/Physician   [] Patient Rounds/Interdisciplinary Meeting    Diet: Dental Soft diet  Patient Vitals for the past 100 hrs:   % Diet Eaten   07/28/17 1042 50 %   07/27/17 0951 5 %   07/26/17 1755 0 %   07/26/17 1244 25 %   07/24/17 1720 50 %   07/24/17 1245 50 %   07/24/17 0947 25 %     Medications: [x] Reviewed   Encounter Diagnoses     ICD-10-CM ICD-9-CM   1. Gastrointestinal hemorrhage, unspecified gastrointestinal hemorrhage type K92.2 578.9   2. Febrile illness, acute R50.9 780.60   3. Acute UTI N39.0 599.0   4.  Low hemoglobin D64.9 285.9     Past Medical History:   Diagnosis Date    CHF (congestive heart failure) ([facility]     DM (diabetes mellitus) ([facility]  Gout     HTN (hypertension)      Labs:    Lab Results   Component Value Date/Time    Sodium 144 07/28/2017 05:40 AM    Potassium 3.5 07/28/2017 05:40 AM    Chloride 109 07/28/2017 05:40 AM    CO2 26 07/28/2017 05:40 AM    Anion gap 9 07/28/2017 05:40 AM    Glucose 114 07/28/2017 05:40 AM    BUN 27 07/28/2017 05:40 AM    Creatinine 1.18 07/28/2017 05:40 AM    Calcium 9.4 07/28/2017 05:40 AM    Magnesium 2.5 07/16/2017 11:50 PM    Phosphorus 2.8 07/28/2017 05:40 AM    Albumin 1.5 07/28/2017 05:40 AM     Anthropometrics: BMI (calculated): 27.3   Last 3 Recorded Weights in this Encounter    07/26/17 0518 07/27/17 0542 07/28/17 0529   Weight: 92.4 kg (203 lb 11.3 oz) 91.3 kg (201 lb 3.2 oz) 91.2 kg (201 lb)      Ht Readings from Last 1 Encounters:   07/16/17 6' (1.829 m)     []  Weight Loss  []  Weight Gain  [x]  Weight Stable (+/- 1-2 lb)   []  New wt n/a on record     Estimated Nutrition Needs:   2155 Kcals/day  Protein (g): 87 g    Nutrition Problems Identified:   [] Suboptimal PO intake   [] Food Allergies  [x] Difficulty chewing/swallowing/poor dentition  [] Constipation/Diarrhea   [] Nausea/Vomiting   [] None  [] Other:     Plan:   [] Therapeutic Diet  []  Obtained/adjusted food preferences/tolerances and/or snacks options   [x]  Supplements added   [] Occupational therapy following for feeding techniques  []  HS snack added   [x]  Modify diet texture   []  Modify diet for food allergies   []  Assist with menu selection   [x]  Monitor PO intake on meal rounds   [x]  Continue inpatient monitoring and intervention   []  Participated in discharge planning/Interdisciplinary rounds/Team meetings   []  Other:     Education Needs:   [x] Not appropriate for teaching at this time    [] Identified and addressed    Nutrition Monitoring and Evaluation:   [x] Continue inpatient monitoring and interventions    [] Other:     Mary Rice

## 2017-07-28 NOTE — ROUTINE PROCESS
0800 - assumed care of patient - sleeping - call bell in reach    1200 - labs drawn via PICC. 1420 - patient has orders to return to [facility] this afternoon. 1600 - report called to [facility] with patient update.    time is 1730.    [phone] - patient transported via [address] transport to [facility]

## 2017-07-28 NOTE — PROGRESS NOTES
[facility] [address] transport @ 5:30. Called pt's [facility], [address] made him aware of above, agreeable to transfer. Pt's nurse made aware of above. Available as needed. Jada Hare,RN,ext. [phone].      Care Management Interventions  Mode of Transport at Discharge: BLS  Transition of Care Consult (CM Consult): SNF  Partner SNF: Yes  MyChart Signup: No  Discharge Durable Medical Equipment: No  Physical Therapy Consult: No  Occupational Therapy Consult: No  Speech Therapy Consult: No  Current Support Network: Nursing Facility  Confirm Follow Up Transport: Other (see comment)  Plan discussed with Pt/Family/Caregiver: Yes  Discharge Location  Discharge Placement: Skilled nursing facility

## 2017-07-28 NOTE — PROGRESS NOTES
Patient is unable to communicate at this time.  offered prayer and left Spiritual Care brochure. Chaplains will continue to follow and will provide pastoral care on an as needed/requested basis.     [facility]   Staff [facility]   [phone]

## 2017-07-28 NOTE — DISCHARGE SUMMARY
[facility]    Discharge Summary    Patient: Camilo Gee MRN: [id number]  CSN: [id number]    YOB: 1945  Age: 67 y.o. Sex: male    DOA: 7/15/2017 LOS:  LOS: 13 days   Discharge Date: 7/28/2017     Admission Diagnoses: Lower GI bleed  heme + stools    Discharge Diagnoses:    Problem List as of 7/28/2017  Never Reviewed          Codes Class Noted - Resolved    Systolic CHF, chronic ([facility] ICD-10-CM: I50.22  ICD-9-CM: 428.22, 428.0  7/18/2017 - Present        * (Principal)Lower GI bleed ICD-10-CM: K92.2  ICD-9-CM: 578.9  7/15/2017 - Present        Altered mental status ICD-10-CM: R41.82  ICD-9-CM: 780.97  7/15/2017 - Present        CKD (chronic kidney disease) stage 3, GFR 30-59 ml/min ICD-10-CM: N18.3  ICD-9-CM: 585.3  7/15/2017 - Present        Urinary tract infection without hematuria ICD-10-CM: N39.0  ICD-9-CM: 599.0  7/15/2017 - Present        NEPTALI (acute kidney injury) ([facility] ICD-10-CM: N17.9  ICD-9-CM: 584.9  2/15/2017 - Present        Febrile illness, acute ICD-10-CM: R50.9  ICD-9-CM: 780.60  2/15/2017 - Present        Acute upper GI bleed ICD-10-CM: K92.2  ICD-9-CM: 578.9  3/15/2016 - Present              Discharge Condition: Stable    Discharge To: SNF    Consults: Hospitalist and Nephrology, GI, ID     Hospital Course:   Camilo Gee is a 67 y.o. male who is deaf and mute and a resident of [address] [place] [address] in [place]. He was brought to the ER due to AMS. Per ER , the staff at the nursing home mentioned that the patientt was noted to have AMS. Being deaf and mute he can read lips and write but he was unable to do that today. ER evaluation showed the patient to have an anemia with hgb in the 6 range. Rectal exam was heme positive. Patient was admitted for ongoing medical management. Patient was transfused with 2 units PRBC. GI was consulted. Patient then developed pseudoobstruction. NGT was placed and Hgb remained stable. Patient was also started on Miralax. Bowel function returned.  Diet was slowly advanced and tolerated. Nephrology was consulted 2/2 NEPTALI on CKD. NEPTALI was found to be in the setting of UTI/sepsis/GI Bleeding. Patient's renal status continued to improve. ID was consulted 2/2 E. Coli UTI and staph aureus sepsis without clear source. ID recommended 6 weeks of IV Cefazolin. PICC was placed. End date of antibiotics 8/31/2017. Patient is stable for discharge back to SNF. Physical Exam  General appearance: alert, cooperative, no distress, appears stated age  Lungs: clear to auscultation throughout, no wheezes   Heart: regular rate and rhythm, S1, S2 normal, no murmur, click, rub or gallop  Abdomen: soft, non tender, non distended. Normoactive bowel sounds  Extremities: extremities normal, atraumatic, no cyanosis. Trace BLE edema.  Prevalon boots BLE   Skin: Skin color, texture, turgor normal. No rashes or lesion    Significant Diagnostic Studies:   Recent Results (from the past 24 hour(s))   GLUCOSE, POC    Collection Time: 07/27/17  5:14 PM   Result Value Ref Range    Glucose (POC) 177 (H) 70 - 110 mg/dL   GLUCOSE, POC    Collection Time: 07/27/17 11:42 PM   Result Value Ref Range    Glucose (POC) 92 70 - 110 mg/dL   GLUCOSE, POC    Collection Time: 07/28/17  5:09 AM   Result Value Ref Range    Glucose (POC) 105 70 - 110 mg/dL   RENAL FUNCTION PANEL    Collection Time: 07/28/17  5:40 AM   Result Value Ref Range    Sodium 144 136 - 145 mmol/L    Potassium 3.5 3.5 - 5.5 mmol/L    Chloride 109 (H) 100 - 108 mmol/L    CO2 26 21 - 32 mmol/L    Anion gap 9 3.0 - 18 mmol/L    Glucose 114 (H) 74 - 99 mg/dL    BUN 27 (H) 7.0 - 18 MG/DL    Creatinine 1.18 0.6 - 1.3 MG/DL    BUN/Creatinine ratio 23 (H) 12 - 20      GFR est AA >60 >60 ml/min/1.73m2    GFR est non-AA >60 >60 ml/min/1.73m2    Calcium 9.4 8.5 - 10.1 MG/DL    Phosphorus 2.8 2.5 - 4.9 MG/DL    Albumin 1.5 (L) 3.4 - 5.0 g/dL   GLUCOSE, POC    Collection Time: 07/28/17  5:52 AM   Result Value Ref Range    Glucose (POC) 105 70 - 110 mg/dL   GLUCOSE, POC    Collection Time: 07/28/17 11:23 AM   Result Value Ref Range    Glucose (POC) 190 (H) 70 - 110 mg/dL   CBC W/O DIFF    Collection Time: 07/28/17 11:55 AM   Result Value Ref Range    WBC 10.0 4.6 - 13.2 K/uL    RBC 4.30 (L) 4.70 - 5.50 M/uL    HGB 11.0 (L) 13.0 - 16.0 g/dL    HCT 35.5 (L) 36.0 - 48.0 %    MCV 82.6 74.0 - 97.0 FL    MCH 25.6 24.0 - 34.0 PG    MCHC 31.0 31.0 - 37.0 g/dL    RDW 17.3 (H) 11.6 - 14.5 %    PLATELET 527 852 - 350 K/uL    MPV 9.8 9.2 - 11.8 FL         Discharge Medications:     Current Discharge Medication List      START taking these medications    Details   pantoprazole (PROTONIX) 40 mg tablet Take 1 Tab by mouth daily. Qty: 30 Tab, Refills: 0      polyethylene glycol (MIRALAX) 17 gram packet Take 1 Packet by mouth daily. Qty: 1 Each, Refills: 0      insulin lispro (HUMALOG) 100 unit/mL injection Less than 150 =   0 units  150 -199 =   3 units  200 -249 =   6 units  250 -299 =   9 units  300 -349 =   12 units  350 and above =   15 units  Initiate Hypoglycemic protocol if blood glucose is <70 mg/dL  Qty: 1 Vial, Refills: 0      Cefazolin 2g IVPB every 8 hours. End date 8/31/2017. CONTINUE these medications which have NOT CHANGED    Details   mineral oil-hydrophil petrolat (AQUAPHOR) ointment Apply  to affected area as needed for Dry Skin. budesonide (PULMICORT) 0.5 mg/2 mL nbsp 500 mcg by Nebulization route two (2) times a day. bumetanide (BUMEX) 1 mg tablet Take 2 mg by mouth two (2) times a day. allopurinol (ZYLOPRIM) 100 mg tablet Take 200 mg by mouth every other day. atorvastatin (LIPITOR) 10 mg tablet Take 10 mg by mouth daily. latanoprost (XALATAN) 0.005 % ophthalmic solution Administer 1 Drop to both eyes nightly. nitroglycerin (NITRODUR) 0.4 mg/hr 1 Patch by TransDERmal route daily. cholecalciferol (VITAMIN D3) 1,000 unit cap Take 1,000 Units by mouth daily.       brimonidine (ALPHAGAN) 0.2 % ophthalmic solution Administer 1 Drop to both eyes two (2) times a day. dorzolamide-timolol (COSOPT) 22.3-6.8 mg/mL ophthalmic solution Administer 1 Drop to both eyes two (2) times a day. insulin glargine (LANTUS SOLOSTAR) 100 unit/mL (3 mL) pen 20 Units by SubCUTAneous route two (2) times a day. senna (SENOKOT) 8.6 mg tablet Take 1 Tab by mouth two (2) times a day.          STOP taking these medications       metOLazone (ZAROXOLYN) 2.5 mg tablet Comments:   Reason for Stopping:         spironolactone (ALDACTONE) 100 mg tablet Comments:   Reason for Stopping:         insulin aspart (NOVOLOG) 100 unit/mL injection Comments:   Reason for Stopping:         amLODIPine (NORVASC) 5 mg tablet Comments:   Reason for Stopping:         potassium chloride (K-DUR, KLOR-CON) 20 mEq tablet Comments:   Reason for Stopping:               Activity: PT/OT Eval and Treat    Diet: Dental soft     Wound Care: None needed    Follow-up: Facility provider on admission     Discharge time: 50 minutes   Birgit Clay NP  7/28/2017, 2:26 PM

## 2017-07-28 NOTE — CDMP QUERY
Please clarify if this patient is being treated/managed for:    =>Mild Protein Calorie Malnutrition  =>Other Explanation of clinical findings  =>Unable to Determine (no explanation of clinical findings)    The medical record reflects the following:    Risk:  Patient with colonic dilation, most suggestive of colonic pseudo-obstruction. Hx of chronic illnesses, CRF, DM, CHF. .. Clinical Indicators:   Nutrition note- 7/24- Tolerating dental soft diet. Less than 50% intake of lunch meal at time of visit, However, 50-90% intake of meals per vitals. Difficulty chewing/swallowing/poor dentition. 7/26- IM- albumin is low at 1.5 .will need to feed the pt . Will discuss with nutrition      Treatment: 1. Dental Soft diet  2. Ensure BID  3. Monitor weight, labs and PO intake  4. RD to follow    Please clarify and document your clinical opinion in the progress notes and discharge.      Thank you,  Sagrario Decker RN [phone]

## 2017-08-02 ENCOUNTER — HOSPITAL ENCOUNTER (EMERGENCY)
Age: 72
Discharge: NURSING, REHAB OR LONG-TERM CARE | End: 2017-08-02
Attending: EMERGENCY MEDICINE
Payer: MEDICARE

## 2017-08-02 VITALS
OXYGEN SATURATION: 94 % | SYSTOLIC BLOOD PRESSURE: 124 MMHG | HEART RATE: 108 BPM | TEMPERATURE: 97.9 F | DIASTOLIC BLOOD PRESSURE: 69 MMHG | RESPIRATION RATE: 24 BRPM

## 2017-08-02 DIAGNOSIS — E86.0 DEHYDRATION: ICD-10-CM

## 2017-08-02 DIAGNOSIS — E87.6 HYPOKALEMIA: Primary | ICD-10-CM

## 2017-08-02 DIAGNOSIS — R79.89 ELEVATED SERUM CREATININE: ICD-10-CM

## 2017-08-02 LAB
ALBUMIN SERPL BCP-MCNC: 1.4 G/DL (ref 3.4–5)
ALBUMIN/GLOB SERPL: 0.2 {RATIO} (ref 0.8–1.7)
ALP SERPL-CCNC: 242 U/L (ref 45–117)
ALT SERPL-CCNC: <6 U/L (ref 16–61)
ANION GAP BLD CALC-SCNC: 9 MMOL/L (ref 3–18)
AST SERPL W P-5'-P-CCNC: 61 U/L (ref 15–37)
BASOPHILS # BLD AUTO: 0 K/UL (ref 0–0.06)
BASOPHILS # BLD: 0 % (ref 0–2)
BILIRUB SERPL-MCNC: 0.7 MG/DL (ref 0.2–1)
BUN SERPL-MCNC: 55 MG/DL (ref 7–18)
BUN/CREAT SERPL: 28 (ref 12–20)
CALCIUM SERPL-MCNC: 9.3 MG/DL (ref 8.5–10.1)
CHLORIDE SERPL-SCNC: 106 MMOL/L (ref 100–108)
CO2 SERPL-SCNC: 30 MMOL/L (ref 21–32)
CREAT SERPL-MCNC: 1.96 MG/DL (ref 0.6–1.3)
DIFFERENTIAL METHOD BLD: ABNORMAL
EOSINOPHIL # BLD: 0.2 K/UL (ref 0–0.4)
EOSINOPHIL NFR BLD: 2 % (ref 0–5)
ERYTHROCYTE [DISTWIDTH] IN BLOOD BY AUTOMATED COUNT: 17.5 % (ref 11.6–14.5)
GLOBULIN SER CALC-MCNC: 6.4 G/DL (ref 2–4)
GLUCOSE SERPL-MCNC: 202 MG/DL (ref 74–99)
HCT VFR BLD AUTO: 36 % (ref 36–48)
HGB BLD-MCNC: 11.1 G/DL (ref 13–16)
LYMPHOCYTES # BLD AUTO: 7 % (ref 21–52)
LYMPHOCYTES # BLD: 0.7 K/UL (ref 0.9–3.6)
MCH RBC QN AUTO: 25.5 PG (ref 24–34)
MCHC RBC AUTO-ENTMCNC: 30.8 G/DL (ref 31–37)
MCV RBC AUTO: 82.6 FL (ref 74–97)
MONOCYTES # BLD: 0.9 K/UL (ref 0.05–1.2)
MONOCYTES NFR BLD AUTO: 9 % (ref 3–10)
NEUTS SEG # BLD: 7.9 K/UL (ref 1.8–8)
NEUTS SEG NFR BLD AUTO: 82 % (ref 40–73)
PLATELET # BLD AUTO: 317 K/UL (ref 135–420)
PMV BLD AUTO: 9.6 FL (ref 9.2–11.8)
POTASSIUM SERPL-SCNC: 3.3 MMOL/L (ref 3.5–5.5)
PROT SERPL-MCNC: 7.8 G/DL (ref 6.4–8.2)
RBC # BLD AUTO: 4.36 M/UL (ref 4.7–5.5)
SODIUM SERPL-SCNC: 145 MMOL/L (ref 136–145)
WBC # BLD AUTO: 9.7 K/UL (ref 4.6–13.2)

## 2017-08-02 PROCEDURE — 96361 HYDRATE IV INFUSION ADD-ON: CPT

## 2017-08-02 PROCEDURE — 74011250636 HC RX REV CODE- 250/636: Performed by: EMERGENCY MEDICINE

## 2017-08-02 PROCEDURE — 96360 HYDRATION IV INFUSION INIT: CPT

## 2017-08-02 PROCEDURE — 80053 COMPREHEN METABOLIC PANEL: CPT | Performed by: NURSE PRACTITIONER

## 2017-08-02 PROCEDURE — 74011250637 HC RX REV CODE- 250/637: Performed by: EMERGENCY MEDICINE

## 2017-08-02 PROCEDURE — 85025 COMPLETE CBC W/AUTO DIFF WBC: CPT | Performed by: NURSE PRACTITIONER

## 2017-08-02 PROCEDURE — 99285 EMERGENCY DEPT VISIT HI MDM: CPT

## 2017-08-02 RX ORDER — POTASSIUM CHLORIDE 20 MEQ/1
20 TABLET, EXTENDED RELEASE ORAL
Status: COMPLETED | OUTPATIENT
Start: 2017-08-02 | End: 2017-08-02

## 2017-08-02 RX ADMIN — POTASSIUM CHLORIDE 20 MEQ: 20 TABLET, EXTENDED RELEASE ORAL at 19:54

## 2017-08-02 RX ADMIN — SODIUM CHLORIDE 500 ML: 900 INJECTION, SOLUTION INTRAVENOUS at 17:58

## 2017-08-02 NOTE — ED NOTES
Labs drawn from pt's PICC line. Line flushed both ports with 10mL normal saline. Green caps applied. Pt repositioned in bed. Monitors attached.

## 2017-08-02 NOTE — ED PROVIDER NOTES
[facility]  EMERGENCY DEPARTMENT HISTORY AND PHYSICAL EXAM       Date: 8/2/2017   Patient Name: Rob Pickett   YOB: 1945  Medical Record Number: [id number]    History of Presenting Illness     Chief Complaint   Patient presents with    Abnormal Lab Results        History Provided By:  ED nurse     Additional History:   5:35 PM   Rob Pickett is a 67 y.o. male presenting to the ED from [facility] for evaluation of abnormal lab work. Hx is limited due to pt being non verbal and lack of contact from nursing facility. Staff discussed with nursing home, concern is mildly elevated bun and cr, and low K. They also describe him having a more flat affect, though his baseline is nonverbal and deaf, according to them. Primary Care Provider: Bonita Handy MD   Specialist:    Past History     Past Medical History:   Past Medical History:   Diagnosis Date    [facility]est[facility]     DM [facility]diabetes mellitus) ([facility]     Gout     HTN (hypertension)         Past Surgical History:   History reviewed. No pertinent surgical history. Social History:   Social History   Substance Use Topics    Smoking status: Never Smoker    Smokeless tobacco: Never Used    Alcohol use No        Allergies:   No Known Allergies     Review of Systems   Review of Systems   Unable to perform ROS: Patient nonverbal         Physical Exam  Vitals:    08/02/17 1750 08/02/17 1810 08/02/17 1830 08/02/17 1850   BP: 130/69 115/46 124/68 120/71   Pulse: (!) 109 (!) 109 (!) 111 (!) 113   Resp: 27 27 24 27   Temp:       SpO2:  97% 96% 95%       Physical Exam   Constitutional: He appears well-developed and well-nourished. Alert but not responding to verbal   HENT:   Head: Normocephalic and atraumatic. Eyes: Conjunctivae are normal. No scleral icterus. Neck: Normal range of motion. Neck supple. No JVD present. Cardiovascular: Regular rhythm and normal heart sounds.   Tachycardia present. 4 intact extremity pulses   Pulmonary/Chest: Effort normal and breath sounds normal.   Abdominal: Soft. He exhibits no mass. There is no tenderness. Musculoskeletal: Normal range of motion. Lymphadenopathy:     He has no cervical adenopathy. Neurological: He is alert. Alert but not responding to verbal. Regards, does not follow commands. Makes purposeful movements. Skin: Skin is warm and dry. Nursing note and vitals reviewed. Diagnostic Study Results     Labs -      Recent Results (from the past 12 hour(s))   CBC WITH AUTOMATED DIFF    Collection Time: 08/02/17  3:35 PM   Result Value Ref Range    WBC 9.7 4.6 - 13.2 K/uL    RBC 4.36 (L) 4.70 - 5.50 M/uL    HGB 11.1 (L) 13.0 - 16.0 g/dL    HCT 36.0 36.0 - 48.0 %    MCV 82.6 74.0 - 97.0 FL    MCH 25.5 24.0 - 34.0 PG    MCHC 30.8 (L) 31.0 - 37.0 g/dL    RDW 17.5 (H) 11.6 - 14.5 %    PLATELET 814 226 - 464 K/uL    MPV 9.6 9.2 - 11.8 FL    NEUTROPHILS 82 (H) 40 - 73 %    LYMPHOCYTES 7 (L) 21 - 52 %    MONOCYTES 9 3 - 10 %    EOSINOPHILS 2 0 - 5 %    BASOPHILS 0 0 - 2 %    ABS. NEUTROPHILS 7.9 1.8 - 8.0 K/UL    ABS. LYMPHOCYTES 0.7 (L) 0.9 - 3.6 K/UL    ABS. MONOCYTES 0.9 0.05 - 1.2 K/UL    ABS. EOSINOPHILS 0.2 0.0 - 0.4 K/UL    ABS. BASOPHILS 0.0 0.0 - 0.06 K/UL    DF AUTOMATED     METABOLIC PANEL, COMPREHENSIVE    Collection Time: 08/02/17  3:35 PM   Result Value Ref Range    Sodium 145 136 - 145 mmol/L    Potassium 3.3 (L) 3.5 - 5.5 mmol/L    Chloride 106 100 - 108 mmol/L    CO2 30 21 - 32 mmol/L    Anion gap 9 3.0 - 18 mmol/L    Glucose 202 (H) 74 - 99 mg/dL    BUN 55 (H) 7.0 - 18 MG/DL    Creatinine 1.96 (H) 0.6 - 1.3 MG/DL    BUN/Creatinine ratio 28 (H) 12 - 20      GFR est AA 41 (L) >60 ml/min/1.73m2    GFR est non-AA 34 (L) >60 ml/min/1.73m2    Calcium 9.3 8.5 - 10.1 MG/DL    Bilirubin, total 0.7 0.2 - 1.0 MG/DL    ALT (SGPT) <6 (L) 16 - 61 U/L    AST (SGOT) 61 (H) 15 - 37 U/L    Alk.  phosphatase 242 (H) 45 - 117 U/L    Protein, total 7.8 6.4 - 8.2 g/dL    Albumin 1.4 (L) 3.4 - 5.0 g/dL    Globulin 6.4 (H) 2.0 - 4.0 g/dL    A-G Ratio 0.2 (L) 0.8 - 1.7         Radiologic Studies -    No orders to display             I reviewed the vital signs, available nursing notes, past medical history, past surgical history, family history and social history. Vital Signs-Reviewed the patient's vital signs. Patient Vitals for the past 12 hrs:   Temp Pulse Resp BP SpO2   08/02/17 1850 - (!) 113 27 120/71 95 %   08/02/17 1830 - (!) 111 24 124/68 96 %   08/02/17 1810 - (!) 109 27 115/46 97 %   08/02/17 1750 - (!) 109 27 130/69 -   08/02/17 1730 - (!) 110 22 122/71 -   08/02/17 1710 - (!) 110 23 132/62 -   08/02/17 1640 - (!) 112 (!) 32 (!) 116/92 93 %   08/02/17 1630 - (!) 112 25 133/64 93 %   08/02/17 1540 - (!) 111 27 126/66 90 %   08/02/17 1502 97.9 °F (36.6 °C) (!) 114 16 127/68 97 %     Old Medical Records: Nursing notes. Provider Notes:   Discussed with nursing home and pt was sent here for elevated BUN and creatinine. ED Course:      5:35 PM  Initial assessment performed. 5:48 PM   Abnormal labs include minimally low potassium and minimally elvated creatinine with a high BUN:creatinine ratio, suggesting dehydration. Liter fluids ordered. 7:55 PM discussed care with Dr Skip Boston so he's away, but I've printed dc paperwork. Pt will get 250 cc fluid, 20 meq kcl, and nursing home can recheck bmp in 2 days to ensure his kidnies are doing better. Being very judicious with fluid as was discharged on 15th for chf and jeanna. Pt is actually communicative, nods yes and no. Medications   potassium chloride (K-DUR, KLOR-CON) SR tablet 20 mEq (not administered)   sodium chloride 0.9 % bolus infusion 1,000 mL (500 mL IntraVENous New Bag 8/2/17 175)         Diagnosis   Clinical Impression:   1. Hypokalemia    2. Dehydration    3.  Elevated serum creatinine         _____________________________   Attestations:     ALESHA ATTESTATION STATEMENT  Documented by: Penelope Scheuermann, scribing for and in the presence of Ladarius Sepulveda MD.     Signed by: Penelope Scheuermann, Scribe, 08/02/17 5:35 PM    PROVIDER ATTESTATION STATEMENT  I personally performed the services described in the documentation, reviewed the documentation, as recorded by the scribe in my presence, and it accurately and completely records my words and actions.   Ladarius Sepulveda MD.      _______________________________

## 2017-08-03 NOTE — ED NOTES
SIDE RAILS UP X 2   BED IN LOW AND LOCKED  POSITION  FLUIDS AND TOILETING OFFERED  PLAN OF CARE REVIEWED WITH PT/FAMILY  IV SITE ASSESSED   PAIN ASSESSED  COMFORT ADDRESSED  CALL BELL WITHIN REACH      Repositioned on cart.

## 2017-08-03 NOTE — ED NOTES
SIDE RAILS UP X 2   BED IN LOW AND LOCKED  POSITION  FLUIDS AND TOILETING OFFERED  PLAN OF CARE REVIEWED WITH PT/FAMILY  IV SITE ASSESSED   PAIN ASSESSED  COMFORT ADDRESSED  CALL BELL WITHIN REACH      Patient repositioned on cart.

## 2017-08-03 NOTE — ED NOTES
SIDE RAILS UP X 2   BED IN LOW AND LOCKED  POSITION  FLUIDS AND TOILETING OFFERED  PLAN OF CARE REVIEWED WITH PT/FAMILY  IV SITE ASSESSED   PAIN ASSESSED  COMFORT ADDRESSED  CALL BELL WITHIN REACH

## 2017-08-03 NOTE — DISCHARGE INSTRUCTIONS
Repeat BMP in 2 days. I have reviewed discharge instructions with the patient and caregiver. The caregiver verbalized understanding.
